# Patient Record
Sex: FEMALE | Race: WHITE | NOT HISPANIC OR LATINO | Employment: OTHER | URBAN - METROPOLITAN AREA
[De-identification: names, ages, dates, MRNs, and addresses within clinical notes are randomized per-mention and may not be internally consistent; named-entity substitution may affect disease eponyms.]

---

## 2017-01-11 ENCOUNTER — RADIANT APPOINTMENT (OUTPATIENT)
Dept: BONE DENSITY | Facility: CLINIC | Age: 57
End: 2017-01-11
Attending: FAMILY MEDICINE
Payer: COMMERCIAL

## 2017-01-11 DIAGNOSIS — Z78.0 ASYMPTOMATIC MENOPAUSAL STATE: ICD-10-CM

## 2017-01-11 PROCEDURE — 77080 DXA BONE DENSITY AXIAL: CPT | Performed by: INTERNAL MEDICINE

## 2017-01-18 ENCOUNTER — TELEPHONE (OUTPATIENT)
Dept: FAMILY MEDICINE | Facility: CLINIC | Age: 57
End: 2017-01-18

## 2017-01-18 NOTE — TELEPHONE ENCOUNTER
Synthroid (brand name) requires a Prior Authorization.   Reason / Alternatives per Insurance rejection: GENERIC SUBST. REQUIRED FOR PAYMENT    Note on prescription states: patient must have brand name as she does not absorb the generic consistently    Would you like to change the medication or attempt the prior authorization?    Patient's prescription insurance is as follows:  Insurance Company: [employer]   Bin number: [id number]   Phone number: [phone]   ID # [id number]    Please advise.     -Deborah Ram, Certified Pharmacy Technician, CPhT, [facility], Ph. [phone]

## 2017-01-18 NOTE — Clinical Note
[address] [address]  Phone: [phone]   January 23, 2017    June Nunn  [address] [facility] [address]      Dear AGUSTINA Paulson,      We received the denial of the PA for my patient listed above's recent request for brand  name, Synthroid medication for their diagnosis of Hypothyroidism, Unspecified.  My patient was taking Levothyroxine from 9/27/06-10/29/16 and their labs showed this medication was not effectively absorbed consistently to bring their results to a therapeutic range. Since changing to the Synthroid medication, my patient's labs have become therapeutic and are not fluctuating, this medication is medically necessary.      Please approve my patient to have the brand name Synthroid medication per request.      Feel free to contact me with any further questions. Thank you.     Sincerely,         Yolanda Carter M.D.

## 2017-01-19 NOTE — TELEPHONE ENCOUNTER
Name of medication and dosage:  Synthroid 112  Previously tried and failed:  levothyroxine  Submitted PA via:  covermymeds  To:  [facility]  Reference #:  [id number]  Standard or Urgent:  Standard  Date submitted:  1/19/17        Adelaide Dodson

## 2017-01-19 NOTE — TELEPHONE ENCOUNTER
"We've done prior auth's on this before - please do another one - \"patient must have brand name as she does not absorb the generic consistently\"   "

## 2017-01-20 ENCOUNTER — TELEPHONE (OUTPATIENT)
Dept: FAMILY MEDICINE | Facility: CLINIC | Age: 57
End: 2017-01-20

## 2017-01-20 DIAGNOSIS — E78.5 HYPERLIPIDEMIA WITH TARGET LDL LESS THAN 130: Primary | ICD-10-CM

## 2017-01-20 RX ORDER — ROSUVASTATIN CALCIUM 5 MG
5 TABLET ORAL DAILY
Qty: 90 TABLET | Refills: 3 | Status: SHIPPED | OUTPATIENT
Start: 2017-01-20 | End: 2017-12-14

## 2017-01-20 NOTE — TELEPHONE ENCOUNTER
Pt called stating she is out of synthroid today.  Her PA has still not been okayed.  Talked to pharmacy and they can give pt some pills to get her through.  PA has been expedited.      Pt was concerned also why she is getting rosuvastatin instead of crestor.  Will try to resend as MADDY as PA was already approved.    Sasha Calero RN    [facility]

## 2017-01-23 NOTE — TELEPHONE ENCOUNTER
Letter for appeal written, PA not approved.     Called pt to alert them of status. The patient indicates understanding of these issues and agrees with the plan. Pt notes they only have a couple tablets left.  Kendra Lugo RN

## 2017-01-24 ENCOUNTER — TELEPHONE (OUTPATIENT)
Dept: FAMILY MEDICINE | Facility: CLINIC | Age: 57
End: 2017-01-24

## 2017-01-24 NOTE — TELEPHONE ENCOUNTER
Crestor 5mg (brand name) requires a Prior Authorization.   Reason / Alternatives per Insurance rejection:PA required      Would you like to change the medication or attempt the prior authorization?    Patient's prescription insurance is as follows:  Insurance Company: [employer]   Bin number: [id number]   Phone number: [phone]   ID # [id number]    Please advise.     -Gianna Lunsford, Certified Pharmacy Technician, [facility], [facility], Ph. [phone]

## 2017-01-24 NOTE — TELEPHONE ENCOUNTER
Pt called and she stated she is out of medication.  Advised we can fill the generic and she should call her insurance company.    Sasha Calero RN    [facility]

## 2017-01-25 NOTE — TELEPHONE ENCOUNTER
Just spoke to Dr Carter, she is fine with the patient taking the generic Rosuvastatin- which has already been approved by the insurance company.      Generic has only been on the market for about 3-6 months, and as of yet Dr. Carter feels she has not failed on the generic.  Patient will need to prove that she has or is failing on the generic.      I left a message for the patient to call the pharmacy and speak to me regarding this.  Do not proceed with the PA on the brand name Crestor- no legitimate reason for her to not take the generic.    -Deborah Ram, Certified Pharmacy Technician, CP, [facility], Ph. [phone]

## 2017-01-25 NOTE — TELEPHONE ENCOUNTER
Spoke to patient.  Relayed all the information below and she is going to continue taking the generic.      -Deborah Ram, Certified Pharmacy Technician, [facility], [facility], Ph. [phone]

## 2017-01-25 NOTE — TELEPHONE ENCOUNTER
"Please do prior auth- I think we've done this in the past as well.   \"lipitor - fatigue, niaspan - some hot flashes. simvastatin = stomach upset' And pravastatin = muscle aches.   \"     "

## 2017-01-26 NOTE — TELEPHONE ENCOUNTER
Pt caller her insurance company  - the letter for the appeal needs to state that there was an adverse event when she was taking levothroxine, the labs became abnormal and when she was switched to synthroid her labs responded to the medication.   We also need to show support for the adverse event - such as labs    There can be other criteria for the appeal and we will need to discuss     She spoke with Stephanie at [facility]     Please advise     Socorro Stallings RN, BSN  [place]

## 2017-01-27 NOTE — TELEPHONE ENCOUNTER
"Patient called pharmacy after sitting on hold for the nurses for 15 minutes, told her that there are 4 nurses and doing the best they can, she was adamant that I take information from her and pass on to the clinic to \"stop wasting everyone's time\"    Pt received a letter listing the criteria needing to be met for the brand name Synthroid.  It is as follows:  1.  Patient tried 30 day trial of the generic and failed  2.  Had an adverse event from the generic (pt said this happened in early 2016)  3. Adverse event was due to inactive ingredient in generic.  4. MD has documented the adverse event when it happened and what happened    -Deborah Ram, Certified Pharmacy Technician, [facility], [facility], Ph. [phone]    "

## 2017-01-31 NOTE — TELEPHONE ENCOUNTER
Pt calling - advised that the appeal was placed to insurance     Patient stated an understanding and agreed with plan.    Socorro Stallings RN, BSN  [facility]

## 2017-02-03 NOTE — TELEPHONE ENCOUNTER
PA approved. For Synthroid 112 mcg  Effective date: 02/03/2017--02/03/2018  PA reference #: Member ID  [id number]  Pt. notified:   Yes   LMOM that PA approved and patient to notify pharmacy.  Tanja Farrell LPN

## 2017-03-20 DIAGNOSIS — I10 HYPERTENSION GOAL BP (BLOOD PRESSURE) < 140/90: ICD-10-CM

## 2017-03-20 RX ORDER — AMLODIPINE BESYLATE 2.5 MG/1
2.5 TABLET ORAL DAILY
Qty: 90 TABLET | Refills: 0 | Status: SHIPPED | OUTPATIENT
Start: 2017-03-20 | End: 2017-06-19

## 2017-06-19 DIAGNOSIS — I10 HYPERTENSION GOAL BP (BLOOD PRESSURE) < 140/90: ICD-10-CM

## 2017-06-19 RX ORDER — AMLODIPINE BESYLATE 2.5 MG/1
2.5 TABLET ORAL DAILY
Qty: 30 TABLET | Refills: 0 | Status: SHIPPED | OUTPATIENT
Start: 2017-06-19 | End: 2017-06-21

## 2017-06-21 DIAGNOSIS — I10 HYPERTENSION GOAL BP (BLOOD PRESSURE) < 140/90: ICD-10-CM

## 2017-06-21 RX ORDER — AMLODIPINE BESYLATE 2.5 MG/1
2.5 TABLET ORAL DAILY
Qty: 30 TABLET | Refills: 0 | Status: SHIPPED | OUTPATIENT
Start: 2017-06-21 | End: 2017-07-26

## 2017-06-21 NOTE — TELEPHONE ENCOUNTER
Medication is being filled for 1 time refill only due to:  Patient needs to be seen because due for BP check.   Kendra Lugo RN

## 2017-06-21 NOTE — TELEPHONE ENCOUNTER
For her next fill.    Norvasc      Last Written Prescription Date: 6-19-17  Last Fill Quantity: 30, # refills: 0    Last Office Visit with G, P or [facility] prescribing provider:  12-7-16   Future Office Visit:        BP Readings from Last 3 Encounters:   12/07/16 144/84   11/24/15 164/84   10/29/15 (!) 168/92     Thank you,  Gianna Lunsford, [facility]

## 2017-07-26 ENCOUNTER — OFFICE VISIT (OUTPATIENT)
Dept: CARDIOLOGY | Facility: CLINIC | Age: 57
End: 2017-07-26
Payer: COMMERCIAL

## 2017-07-26 ENCOUNTER — PRE VISIT (OUTPATIENT)
Dept: CARDIOLOGY | Facility: CLINIC | Age: 57
End: 2017-07-26

## 2017-07-26 VITALS
HEART RATE: 88 BPM | BODY MASS INDEX: 31.54 KG/M2 | HEIGHT: 63 IN | SYSTOLIC BLOOD PRESSURE: 174 MMHG | DIASTOLIC BLOOD PRESSURE: 100 MMHG | WEIGHT: 178 LBS

## 2017-07-26 DIAGNOSIS — I10 HYPERTENSION GOAL BP (BLOOD PRESSURE) < 140/90: ICD-10-CM

## 2017-07-26 DIAGNOSIS — R01.1 HEART MURMUR: Primary | ICD-10-CM

## 2017-07-26 PROCEDURE — 99212 OFFICE O/P EST SF 10 MIN: CPT | Performed by: INTERNAL MEDICINE

## 2017-07-26 RX ORDER — AMLODIPINE BESYLATE 5 MG/1
5 TABLET ORAL DAILY
Qty: 90 TABLET | Refills: 4 | Status: SHIPPED | OUTPATIENT
Start: 2017-07-26 | End: 2017-08-23

## 2017-07-26 NOTE — LETTER
7/26/2017    Yolanda Carter MD  [address] [address]      RE: June Nunn       Dear Colleague,    I had the pleasure of seeing June Nunn in the [facility].    HPI and Plan:   See dictation    [id number]    Orders Placed This Encounter   Procedures     Follow-Up with Cardiac Advanced Practice Provider     Echocardiogram       Orders Placed This Encounter   Medications     amLODIPine (NORVASC) 5 MG tablet     Sig: Take 1 tablet (5 mg) by mouth daily     Dispense:  90 tablet     Refill:  4       Medications Discontinued During This Encounter   Medication Reason     amLODIPine (NORVASC) 2.5 MG tablet Reorder         Encounter Diagnoses   Name Primary?     Hypertension goal BP (blood pressure) < 140/90 - new diagnosis 11/8/2013- side fx with lisinopril & metoprolol - doesn't want medications      Heart murmur Yes       CURRENT MEDICATIONS:  Current Outpatient Prescriptions   Medication Sig Dispense Refill     amLODIPine (NORVASC) 5 MG tablet Take 1 tablet (5 mg) by mouth daily 90 tablet 4     CRESTOR 5 MG tablet Take 1 tablet (5 mg) by mouth daily 90 tablet 3     SYNTHROID 112 MCG tablet Take 1 tablet (112 mcg) by mouth daily 90 tablet 3     calcium carbonate-vitamin D (CALCIUM + D) 600-200 MG-UNIT TABS Take  by mouth daily.       [DISCONTINUED] amLODIPine (NORVASC) 2.5 MG tablet Take 1 tablet (2.5 mg) by mouth daily 30 tablet 0       ALLERGIES     Allergies   Allergen Reactions     Lisinopril Other (See Comments)     lisinopril 5mg= severe vertigo     Metoprolol      Ringing in ears & extremities feeling cold       PAST MEDICAL HISTORY:  Past Medical History:   Diagnosis Date     Anxiety state, unspecified      ASCUS favor benign 2011    neg HPV     Hyperlipidemia LDL goal < 130     lipitor - fatigue, niaspan - some hot flashes. simvastatin = stomach upset' And pravastatin = muscle aches.        Hypertension goal BP (blood pressure) < 140/90      lisinopril 5mg= severe vertigo; metoprolol 25mg=ringing ears/cold extremities      Low back ache     sees Dr. Dudley Lind - Chiropractor      Other forms of migraine, with intractable migraine, so stated, without mention of status migrainosus      Symptomatic menopausal or female climacteric states     bad hot flashes      Unspecified hypothyroidism        PAST SURGICAL HISTORY:  Past Surgical History:   Procedure Laterality Date     HC TOOTH EXTRACTION W/FORCEP      wisdom       FAMILY HISTORY:  Family History   Problem Relation Age of Onset     C.A.D. Father      MI at 71,  of prostate Cancer     CANCER Father      prostate cancer     Lipids Mother      Thyroid Disease Mother      DIABETES Mother      Hypertension Mother      Hyperlipidemia Mother      CEREBROVASCULAR DISEASE Mother      Hyperlipidemia Brother      Breast Cancer Sister      Breast Cancer Sister      Thyroid Disease Sister      Thyroid Disease Sister      Breast Cancer Sister      at age 40     Hyperlipidemia Sister      Thyroid Disease Sister      Cancer - colorectal No family hx of        SOCIAL HISTORY:  Social History     Social History     Marital status:      Spouse name: Malik      Number of children: 2     Years of education: 18     Occupational History     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 12 oz of tea per day      Sleep Concern No     Stress Concern No     Special Diet No     Exercise Yes     30 minutes daily     Seat Belt Yes     Self-Exams Yes     Parent/Sibling W/ Cabg, Mi Or Angioplasty Before 65f 55m? No     Social History Narrative    Getting a master's degree in Education  "       Is a  since  2008.  Has 2 kids ages `17 and 19 - noted in 2012    Brett 23 and Slade 21 - noted December 7, 2016        Review of Systems:  Skin:  Negative       Eyes:  Positive for glasses reading glasses  ENT:  Positive for nasal congestion possibly stuffy due to seasonal allergies  Respiratory:  Negative       Cardiovascular:  Negative      Gastroenterology: Negative      Genitourinary:  Negative      Musculoskeletal:  Negative      Neurologic:  Negative      Psychiatric:  Negative      Heme/Lymph/Imm:  Positive for hay fever possible seasonal allergies  Endocrine:  Positive for thyroid disorder      Physical Exam:  Vitals: BP (!) 174/100 (BP Location: Right arm, Patient Position: Sitting, Cuff Size: Adult Regular)  Pulse 88  Ht 1.6 m (5' 3\")  Wt 80.7 kg (178 lb)  LMP 05/15/2015  Breastfeeding? No  BMI 31.53 kg/m2    Constitutional:  cooperative, alert and oriented, well developed, well nourished, in no acute distress        Skin:  warm and dry to the touch        Head:  normocephalic        Eyes:  pupils equal and round        ENT:  no pallor or cyanosis        Neck:  carotid pulses are full and equal bilaterally        Chest:  clear to auscultation;normal symmetry          Cardiac: regular rhythm       systolic ejection murmur;RUSB;LUSB          Abdomen:  abdomen soft;no bruits        Vascular: pulses full and equal                                        Extremities and Back:  no deformities, clubbing, cyanosis, erythema observed              Neurological:  affect appropriate, oriented to time, person and place;no gross motor deficits          DATE OF VISIT:  07/26/2017    REFERRING PHYSICIAN:  Dr. Carter    Ms. Nunn is a pleasant 57-year-old female with a underlying history of hypertension and hyperlipidemia, who returns to clinic today due to elevated blood pressures.  I last saw her back in 11/2015 at which time, we placed her on low-dose amlodipine 2.5 mg for her blood pressure.  " She is quite a motivated lady about her healthy lifestyle.  She exercises rigorously and tries to eat right.  She is a little self-deprecating about her medical issues.  I did try to explain to her that essential hypertension is partly genetic and she likely was going to develop high blood pressure because it is in her family history.  She was a bit reluctant taking medication for this in the past, but she did take and it had been working for her quite a while up until recently and she has noticed that her blood pressure has started to increase again.  She was a little tearful about this as she has been once again working really diligently on lifestyle modifications including diet and exercise to attempt to control her high blood pressure as well as cholesterol.  She does describe significant social stressors right now.  It sounds like her youngest child is going to be moving out of the home permanently, going off to college, etc and this has caused her to have a little bit of a empty nest syndrome with that.  She also is planning on selling her home which has been stressful as well.  She does remind me that her blood pressure always goes up a lot higher than what she measures at home in a doctor's office.  She probably has an element of white coat hypertension.  Today in office it was 174/100, pulse of 88.  Weight was 178 and this is gone up about 4 pounds since our last visit.  On exam, I do not appreciate any carotid bruits, but she does have systolic ejection murmurs both in the right and left upper sternal borders.  I did inquire as to whether she has had this before and she reminded me that I did hear it upon her initial visit in 2015 and recommended an echocardiogram but she did not follow through to have that done.  Her lungs are clear posteriorly and she has no peripheral edema on exam today.    In summary Ms. Nunn is a very pleasant 57-year-old female with a history of hypertension and hyperlipidemia.   Her blood pressure has not been well controlled in recent months and we talked about probably social stressors contributing to this as well as over time the body does become more tolerant of medication and often requires additional medication to control blood pressure.  I have recommended that we simply increase her amlodipine from 2.5 mg to 5 mg daily.  I did caution her that she might experience lower extremity edema with this, especially in these hot humid months but she exercises enough that hopefully this will not be an issue.  I will follow up with her though on this to make sure it is controlling her blood pressure better and also not causing any significant side effects.  Lastly, I encouraged her once again to follow up with an echocardiogram.  I am concerned about the heart murmur.  It may be just a benign flow murmur, but I do want to make sure she does not have any significant pulmonary hypertension or valvular issues going on.  She will schedule this and we will go over the results with her followup visit.  Please feel free to contact me with any questions you have in regards to her care.    CC:  Dr. Emma Clark DO    D:  07/26/2017 18:13 T:  07/27/2017 17:52  Document:  [id number] CD\LH\BR    Thank you for allowing me to participate in the care of your patient.    Sincerely,     Aide Clark DO     [facility]

## 2017-07-26 NOTE — MR AVS SNAPSHOT
After Visit Summary   7/26/2017    June Nunn    MRN: [id number]           Patient Information     Date Of Birth          1960        Visit Information        Provider Department      7/26/2017 4:45 PM Aide Clark DO [facility] [place]        Today's Diagnoses     Heart murmur    -  1    Hypertension goal BP (blood pressure) < 140/90 - new diagnosis 11/8/2013- side fx with lisinopril & metoprolol - doesn't want medications           Follow-ups after your visit        Additional Services     Follow-Up with Cardiac Advanced Practice Provider                 Your next 10 appointments already scheduled     Aug 07, 2017  3:00 PM CDT   Ech Complete with [address] ([facility])    [address]  [facility] [address] [phone]           1. Please bring or wear a comfortable two-piece outfit. 2. You may eat, drink and take your normal medicines. 3. For any questions that cannot be answered, please contact the ordering physician ***Please check-in at the [place] Registration Office located in [address] in the [facility] building. When you are finished registering, please go to [address] and have a seat. The technician will call your name for the test.            Aug 23, 2017  3:10 PM CDT   Return Visit with MARGO Davila CNP   [facility] [place] ([facility])    [address]  [facility] [id number] [phone]              Future tests that were ordered for you today     Open Future Orders        Priority Expected Expires Ordered    Echocardiogram Routine 8/2/2017 7/26/2018 7/26/2017    Follow-Up with Cardiac Advanced Practice Provider Routine 8/25/2017 7/26/2018 7/26/2017            Who to contact     If you have questions or need follow up information about today's clinic visit or your  "schedule please contact [facility] [place] directly at [phone].  Normal or non-critical lab and imaging results will be communicated to you by MyChart, letter or phone within 4 business days after the clinic has received the results. If you do not hear from us within 7 days, please contact the clinic through [employer] or phone. If you have a critical or abnormal lab result, we will notify you by phone as soon as possible.  Submit refill requests through [employer] or call your pharmacy and they will forward the refill request to us. Please allow 3 business days for your refill to be completed.          Additional Information About Your Visit        [employer] Information     [employer] gives you secure access to your electronic health record. If you see a primary care provider, you can also send messages to your care team and make appointments. If you have questions, please call your primary care clinic.  If you do not have a primary care provider, please call [phone] and they will assist you.        Care EveryWhere ID     This is your Care EveryWhere ID. This could be used by other organizations to access your [facility] medical records  [id number]        Your Vitals Were     Pulse Height Last Period Breastfeeding? BMI (Body Mass Index)       88 1.6 m (5' 3\") 05/15/2015 No 31.53 kg/m2        Blood Pressure from Last 3 Encounters:   07/26/17 (!) 174/100   12/07/16 144/84   11/24/15 164/84    Weight from Last 3 Encounters:   07/26/17 80.7 kg (178 lb)   12/07/16 80 kg (176 lb 6 oz)   11/24/15 80.7 kg (178 lb)                 Today's Medication Changes          These changes are accurate as of: 7/26/17  5:10 PM.  If you have any questions, ask your nurse or doctor.               These medicines have changed or have updated prescriptions.        Dose/Directions    amLODIPine 5 MG tablet   Commonly known as:  NORVASC   This may have changed:    - medication strength  - how much to take "   Used for:  Hypertension goal BP (blood pressure) < 140/90   Changed by:  Aide Clark DO        Dose:  5 mg   Take 1 tablet (5 mg) by mouth daily   Quantity:  90 tablet   Refills:  4            Where to get your medicines      These medications were sent to [facility] [address]  [address] [address]     Phone:  [phone]     amLODIPine 5 MG tablet                Primary Care Provider Office Phone # Fax #    Yolanda Carter [phone]       [facility] [address] [address]        Equal Access to Services     AMBREEN ONEAL : Hadii aad ku hadasho Soomaali, waaxda luqadaha, qaybta kaalmada adeegyada, hannah chambers. So [facility] [phone].    ATENCIÓN: Si habla español, tiene a joseph disposición servicios gratuitos de asistencia lingüística. Llame al [phone].    We comply with applicable federal civil rights laws and [place] laws. We do not discriminate on the basis of race, color, national origin, age, disability sex, sexual orientation or gender identity.            Thank you!     Thank you for choosing [facility] [place]  for your care. Our goal is always to provide you with excellent care. Hearing back from our patients is one way we can continue to improve our services. Please take a few minutes to complete the written survey that you may receive in the mail after your visit with us. Thank you!             Your Updated Medication List - Protect others around you: Learn how to safely use, store and throw away your medicines at www.disposemymeds.org.          This list is accurate as of: 7/26/17  5:10 PM.  Always use your most recent med list.                   Brand Name Dispense Instructions for use Diagnosis    amLODIPine 5 MG tablet    NORVASC    90 tablet    Take 1 tablet (5 mg) by mouth daily     Hypertension goal BP (blood pressure) < 140/90       calcium + D 600-200 MG-UNIT Tabs   Generic drug:  calcium carbonate-vitamin D      Take  by mouth daily.        CRESTOR 5 MG tablet   Generic drug:  rosuvastatin     90 tablet    Take 1 tablet (5 mg) by mouth daily    Hyperlipidemia with target LDL less than 130       SYNTHROID 112 MCG tablet   Generic drug:  levothyroxine     90 tablet    Take 1 tablet (112 mcg) by mouth daily    Hypothyroidism, unspecified

## 2017-07-26 NOTE — PROGRESS NOTES
HPI and Plan:   See dictation    [id number]    Orders Placed This Encounter   Procedures     Follow-Up with Cardiac Advanced Practice Provider     Echocardiogram       Orders Placed This Encounter   Medications     amLODIPine (NORVASC) 5 MG tablet     Sig: Take 1 tablet (5 mg) by mouth daily     Dispense:  90 tablet     Refill:  4       Medications Discontinued During This Encounter   Medication Reason     amLODIPine (NORVASC) 2.5 MG tablet Reorder         Encounter Diagnoses   Name Primary?     Hypertension goal BP (blood pressure) < 140/90 - new diagnosis 11/8/2013- side fx with lisinopril & metoprolol - doesn't want medications      Heart murmur Yes       CURRENT MEDICATIONS:  Current Outpatient Prescriptions   Medication Sig Dispense Refill     amLODIPine (NORVASC) 5 MG tablet Take 1 tablet (5 mg) by mouth daily 90 tablet 4     CRESTOR 5 MG tablet Take 1 tablet (5 mg) by mouth daily 90 tablet 3     SYNTHROID 112 MCG tablet Take 1 tablet (112 mcg) by mouth daily 90 tablet 3     calcium carbonate-vitamin D (CALCIUM + D) 600-200 MG-UNIT TABS Take  by mouth daily.       [DISCONTINUED] amLODIPine (NORVASC) 2.5 MG tablet Take 1 tablet (2.5 mg) by mouth daily 30 tablet 0       ALLERGIES     Allergies   Allergen Reactions     Lisinopril Other (See Comments)     lisinopril 5mg= severe vertigo     Metoprolol      Ringing in ears & extremities feeling cold       PAST MEDICAL HISTORY:  Past Medical History:   Diagnosis Date     Anxiety state, unspecified      ASCUS favor benign 2011    neg HPV     Hyperlipidemia LDL goal < 130     lipitor - fatigue, niaspan - some hot flashes. simvastatin = stomach upset' And pravastatin = muscle aches.        Hypertension goal BP (blood pressure) < 140/90     lisinopril 5mg= severe vertigo; metoprolol 25mg=ringing ears/cold extremities      Low back ache     sees Dr. Dudley Lind - Chiropractor      Other forms of migraine, with intractable migraine, so stated, without mention of status  migrainosus      Symptomatic menopausal or female climacteric states     bad hot flashes      Unspecified hypothyroidism        PAST SURGICAL HISTORY:  Past Surgical History:   Procedure Laterality Date     HC TOOTH EXTRACTION W/FORCEP      wisdom       FAMILY HISTORY:  Family History   Problem Relation Age of Onset     C.A.D. Father      MI at 71,  of prostate Cancer     CANCER Father      prostate cancer     Lipids Mother      Thyroid Disease Mother      DIABETES Mother      Hypertension Mother      Hyperlipidemia Mother      CEREBROVASCULAR DISEASE Mother      Hyperlipidemia Brother      Breast Cancer Sister      Breast Cancer Sister      Thyroid Disease Sister      Thyroid Disease Sister      Breast Cancer Sister      at age 40     Hyperlipidemia Sister      Thyroid Disease Sister      Cancer - colorectal No family hx of        SOCIAL HISTORY:  Social History     Social History     Marital status:      Spouse name: Malik      Number of children: 2     Years of education: 18     Occupational History     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 12 oz of tea per day      Sleep Concern No     Stress Concern No     Special Diet No     Exercise Yes     30 minutes daily     Seat Belt Yes     Self-Exams Yes     Parent/Sibling W/ Cabg, Mi Or Angioplasty Before 65f 55m? No     Social History Narrative    Getting a master's degree in Education        Is a  since  .  Has 2 kids ages `17 and 19 - noted in     [place]  and [place] 21 - noted 2016        Review of Systems:  Skin:  Negative       Eyes:  Positive for glasses reading glasses  ENT:   "Positive for nasal congestion possibly stuffy due to seasonal allergies  Respiratory:  Negative       Cardiovascular:  Negative      Gastroenterology: Negative      Genitourinary:  Negative      Musculoskeletal:  Negative      Neurologic:  Negative      Psychiatric:  Negative      Heme/Lymph/Imm:  Positive for hay fever possible seasonal allergies  Endocrine:  Positive for thyroid disorder      Physical Exam:  Vitals: BP (!) 174/100 (BP Location: Right arm, Patient Position: Sitting, Cuff Size: Adult Regular)  Pulse 88  Ht 1.6 m (5' 3\")  Wt 80.7 kg (178 lb)  LMP 05/15/2015  Breastfeeding? No  BMI 31.53 kg/m2    Constitutional:  cooperative, alert and oriented, well developed, well nourished, in no acute distress        Skin:  warm and dry to the touch        Head:  normocephalic        Eyes:  pupils equal and round        ENT:  no pallor or cyanosis        Neck:  carotid pulses are full and equal bilaterally        Chest:  clear to auscultation;normal symmetry          Cardiac: regular rhythm       systolic ejection murmur;RUSB;LUSB          Abdomen:  abdomen soft;no bruits        Vascular: pulses full and equal                                        Extremities and Back:  no deformities, clubbing, cyanosis, erythema observed              Neurological:  affect appropriate, oriented to time, person and place;no gross motor deficits              CC  No referring provider defined for this encounter.                  "

## 2017-07-28 NOTE — PROGRESS NOTES
DATE OF VISIT:  07/26/2017    REFERRING PHYSICIAN:  Dr. Carter    Ms. Nunn is a pleasant 57-year-old female with a underlying history of hypertension and hyperlipidemia, who returns to clinic today due to elevated blood pressures.  I last saw her back in 11/2015 at which time, we placed her on low-dose amlodipine 2.5 mg for her blood pressure.  She is quite a motivated lady about her healthy lifestyle.  She exercises rigorously and tries to eat right.  She is a little self-deprecating about her medical issues.  I did try to explain to her that essential hypertension is partly genetic and she likely was going to develop high blood pressure because it is in her family history.  She was a bit reluctant taking medication for this in the past, but she did take and it had been working for her quite a while up until recently and she has noticed that her blood pressure has started to increase again.  She was a little tearful about this as she has been once again working really diligently on lifestyle modifications including diet and exercise to attempt to control her high blood pressure as well as cholesterol.  She does describe significant social stressors right now.  It sounds like her youngest child is going to be moving out of the home permanently, going off to college, etc and this has caused her to have a little bit of a empty nest syndrome with that.  She also is planning on selling her home which has been stressful as well.  She does remind me that her blood pressure always goes up a lot higher than what she measures at home in a doctor's office.  She probably has an element of white coat hypertension.  Today in office it was 174/100, pulse of 88.  Weight was 178 and this is gone up about 4 pounds since our last visit.  On exam, I do not appreciate any carotid bruits, but she does have systolic ejection murmurs both in the right and left upper sternal borders.  I did inquire as to whether she has had this  before and she reminded me that I did hear it upon her initial visit in 2015 and recommended an echocardiogram but she did not follow through to have that done.  Her lungs are clear posteriorly and she has no peripheral edema on exam today.    In summary Ms. Nunn is a very pleasant 57-year-old female with a history of hypertension and hyperlipidemia.  Her blood pressure has not been well controlled in recent months and we talked about probably social stressors contributing to this as well as over time the body does become more tolerant of medication and often requires additional medication to control blood pressure.  I have recommended that we simply increase her amlodipine from 2.5 mg to 5 mg daily.  I did caution her that she might experience lower extremity edema with this, especially in these hot humid months but she exercises enough that hopefully this will not be an issue.  I will follow up with her though on this to make sure it is controlling her blood pressure better and also not causing any significant side effects.  Lastly, I encouraged her once again to follow up with an echocardiogram.  I am concerned about the heart murmur.  It may be just a benign flow murmur, but I do want to make sure she does not have any significant pulmonary hypertension or valvular issues going on.  She will schedule this and we will go over the results with her followup visit.  Please feel free to contact me with any questions you have in regards to her care.    CC:  Dr. Emma Clark DO    D:  07/26/2017 18:13 T:  07/27/2017 17:52  Document:  [id number] CD\LH\BR

## 2017-08-08 ENCOUNTER — HOSPITAL ENCOUNTER (OUTPATIENT)
Dept: CARDIOLOGY | Facility: CLINIC | Age: 57
Discharge: HOME | End: 2017-08-08
Attending: INTERNAL MEDICINE | Admitting: INTERNAL MEDICINE
Payer: COMMERCIAL

## 2017-08-08 DIAGNOSIS — I10 HYPERTENSION GOAL BP (BLOOD PRESSURE) < 140/90: ICD-10-CM

## 2017-08-08 DIAGNOSIS — R01.1 HEART MURMUR: ICD-10-CM

## 2017-08-08 PROCEDURE — 93306 TTE W/DOPPLER COMPLETE: CPT | Mod: 26 | Performed by: INTERNAL MEDICINE

## 2017-08-08 PROCEDURE — 93306 TTE W/DOPPLER COMPLETE: CPT

## 2017-08-23 ENCOUNTER — OFFICE VISIT (OUTPATIENT)
Dept: CARDIOLOGY | Facility: CLINIC | Age: 57
End: 2017-08-23
Attending: INTERNAL MEDICINE
Payer: COMMERCIAL

## 2017-08-23 VITALS
BODY MASS INDEX: 31.54 KG/M2 | DIASTOLIC BLOOD PRESSURE: 80 MMHG | HEIGHT: 63 IN | WEIGHT: 178 LBS | HEART RATE: 80 BPM | SYSTOLIC BLOOD PRESSURE: 158 MMHG

## 2017-08-23 DIAGNOSIS — R01.1 HEART MURMUR: ICD-10-CM

## 2017-08-23 DIAGNOSIS — I10 HYPERTENSION GOAL BP (BLOOD PRESSURE) < 140/90: ICD-10-CM

## 2017-08-23 DIAGNOSIS — I10 ESSENTIAL HYPERTENSION WITH GOAL BLOOD PRESSURE LESS THAN 140/90: Primary | ICD-10-CM

## 2017-08-23 PROCEDURE — 99214 OFFICE O/P EST MOD 30 MIN: CPT | Performed by: NURSE PRACTITIONER

## 2017-08-23 RX ORDER — AMLODIPINE BESYLATE 5 MG/1
7.5 TABLET ORAL DAILY
Qty: 135 TABLET | Refills: 4 | Status: SHIPPED | OUTPATIENT
Start: 2017-08-23 | End: 2017-10-02

## 2017-08-23 NOTE — LETTER
8/23/2017    Yolanda Carter MD  [address] [address]    RE: June Nunn       Dear Colleague,    I had the pleasure of seeing June Nunn in the [facility].    This is a 57-year-old female who presents to [facility] today for a followup visit.  She is a patient of Dr. Clark seen in our clinic for hypertension and hyperlipidemia.      June has a history of hypertension that has been ongoing since 2015.  She has worked on lifestyle modifications, but despite this her blood pressure has remained elevated.  In the past, she had some intolerance to atenolol and lisinopril, but has done well on low-dose amlodipine.  When seen by Dr. Clark last month, her blood pressure was elevated and she was asked to increase her amlodipine to 5 mg.  In addition, Dr. Clark heard an audible murmur and suggested echocardiogram.  June has been on a statin for her hyperlipidemia.  She returns today for reassessment and review of her test result.      Overall, June tells me she is doing well.  She is very active by walking and riding a bike without any limitations.  She has no chest pain or shortness of breath.  She denies palpitations, lightheadedness, dizziness, orthopnea or peripheral edema.  She tells me she watches the salt in her diet.      June does monitor her blood pressure at home and tells me her systolic blood pressures are running around 139 mmHg on a regular basis.      I reviewed her echocardiogram.  This shows normal left ventricular function with ejection fraction of 65%-70%, grade I diastolic dysfunction.  There are no regional wall motion abnormalities.  She is noted to have normal right ventricular function and trace tricuspid regurgitation.      PHYSICAL EXAMINATION:  Her blood pressure today is 158/80, heart rate 80 beats per minute and is regular.  Her lungs are clear.  She has trace ankle edema noted  bilaterally.     Outpatient Encounter Prescriptions as of 8/23/2017   Medication Sig Dispense Refill     Omega-3 Fatty Acids (FISH OIL) 600 MG CAPS Take 2,400 mg by mouth daily        [DISCONTINUED] amLODIPine (NORVASC) 5 MG tablet Take 1.5 tablets (7.5 mg) by mouth daily 135 tablet 4     CRESTOR 5 MG tablet Take 1 tablet (5 mg) by mouth daily 90 tablet 3     SYNTHROID 112 MCG tablet Take 1 tablet (112 mcg) by mouth daily 90 tablet 3     calcium carbonate-vitamin D (CALCIUM + D) 600-200 MG-UNIT TABS Take 2 tablets by mouth daily        [DISCONTINUED] amLODIPine (NORVASC) 5 MG tablet Take 1 tablet (5 mg) by mouth daily 90 tablet 4     No facility-administered encounter medications on file as of 8/23/2017.       IMPRESSION AND PLAN:   1.  Hypertension.  Blood pressure is still elevated today despite the increase of amlodipine.  Echocardiogram does show some diastolic dysfunction.  We talked about possibly adding hydrochlorothiazide.  However, at this time, to simplify things we will cautiously increase her amlodipine to 7.5 mg each evening.  She will continue to monitor her blood pressure at home.  If her systolic blood pressure is still running in the 130s, I have asked her to increase her amlodipine to 10 mg.  She will be observant for ankle swelling.  Otherwise, we will have June return in 1 month for reassessment.   2.  Treated hyperlipidemia.      Thank you for allowing me to participate in this patient's care.     Sincerely,    MARGO Coronado CNP     [facility]

## 2017-08-23 NOTE — MR AVS SNAPSHOT
After Visit Summary   8/23/2017    June Nunn    MRN: [id number]           Patient Information     Date Of Birth          1960        Visit Information        Provider Department      8/23/2017 3:10 PM Nisha Cyr APRN CNP [facility]        Today's Diagnoses     Essential hypertension with goal blood pressure less than 140/90-new diagnosis 11/8/2013- side fx with lisinopril & metoprolol -doesn't want meds    -  1    Hypertension goal BP (blood pressure) < 140/90 - new diagnosis 11/8/2013- side fx with lisinopril & metoprolol - doesn't want medications        Heart murmur           Follow-ups after your visit        Additional Services     Follow-Up with Cardiac Advanced Practice Provider                 Your next 10 appointments already scheduled     Oct 02, 2017  1:30 PM CDT   LAB with RU LAB   [facility] ([facility])    [address]  [place] [address] [phone]           Patient must bring picture ID. Patient should be prepared to give a urine specimen  Please do not eat 10-12 hours before your appointment if you are coming in fasting for labs on lipids, cholesterol, or glucose (sugar). Pregnant women should follow their Care Team instructions. Water with medications is okay. Do not drink coffee or other fluids. If you have concerns about taking  your medications, please ask at office or if scheduling via [facility], send a message by clicking on Secure Messaging, Message Your Care Team.            Oct 02, 2017  2:30 PM CDT   Return Visit with MARGO Davila CNP   [facility] ([facility])    [address]  [place] [id number] [phone]              Future tests that were ordered for you today     Open Future Orders        Priority Expected Expires Ordered    Follow-Up with Cardiac Advanced Practice  "Provider Routine 9/22/2017 8/23/2018 8/23/2017            Who to contact     If you have questions or need follow up information about today's clinic visit or your schedule please contact [facility] [place] directly at [phone].  Normal or non-critical lab and imaging results will be communicated to you by MyChart, letter or phone within 4 business days after the clinic has received the results. If you do not hear from us within 7 days, please contact the clinic through [employer] or phone. If you have a critical or abnormal lab result, we will notify you by phone as soon as possible.  Submit refill requests through [employer] or call your pharmacy and they will forward the refill request to us. Please allow 3 business days for your refill to be completed.          Additional Information About Your Visit        [employer] Information     [employer] gives you secure access to your electronic health record. If you see a primary care provider, you can also send messages to your care team and make appointments. If you have questions, please call your primary care clinic.  If you do not have a primary care provider, please call [phone] and they will assist you.        Care EveryWhere ID     This is your Care EveryWhere ID. This could be used by other organizations to access your [place] medical records  [id number]        Your Vitals Were     Pulse Height Last Period BMI (Body Mass Index)          80 1.6 m (5' 3\") 05/15/2015 31.53 kg/m2         Blood Pressure from Last 3 Encounters:   08/23/17 158/80   07/26/17 (!) 174/100   12/07/16 144/84    Weight from Last 3 Encounters:   08/23/17 80.7 kg (178 lb)   07/26/17 80.7 kg (178 lb)   12/07/16 80 kg (176 lb 6 oz)              We Performed the Following     Follow-Up with Cardiac Advanced Practice Provider          Today's Medication Changes          These changes are accurate as of: 8/23/17  3:46 PM.  If you have any questions, ask your nurse " or doctor.               These medicines have changed or have updated prescriptions.        Dose/Directions    amLODIPine 5 MG tablet   Commonly known as:  NORVASC   This may have changed:  how much to take   Used for:  Hypertension goal BP (blood pressure) < 140/90   Changed by:  Nisha Cyr APRN CNP        Dose:  7.5 mg   Take 1.5 tablets (7.5 mg) by mouth daily   Quantity:  135 tablet   Refills:  4            Where to get your medicines      These medications were sent to [facility] - [address], [facility] [address]     Phone:  [phone]     amLODIPine 5 MG tablet                Primary Care Provider Office Phone # Fax #    Yolanda Carter [phone]       [address] [place] [address]        Equal Access to Services     AMBREEN ONEAL : Hadii willis sow hadasho Soomaali, waaxda luqadaha, qaybta kaalmada adeegyada, hannah mendez . So [facility] [phone].    ATENCIÓN: Si habla español, tiene a joseph disposición servicios gratuitos de asistencia lingüística. [facility] [phone].    We comply with applicable federal civil rights laws and [place] laws. We do not discriminate on the basis of race, color, national origin, age, disability sex, sexual orientation or gender identity.            Thank you!     Thank you for choosing [facility] [place]  for your care. Our goal is always to provide you with excellent care. Hearing back from our patients is one way we can continue to improve our services. Please take a few minutes to complete the written survey that you may receive in the mail after your visit with us. Thank you!             Your Updated Medication List - Protect others around you: Learn how to safely use, store and throw away your medicines at www.disposemymeds.org.          This list is accurate as of: 8/23/17  3:46 PM.  Always use  your most recent med list.                   Brand Name Dispense Instructions for use Diagnosis    amLODIPine 5 MG tablet    NORVASC    135 tablet    Take 1.5 tablets (7.5 mg) by mouth daily    Hypertension goal BP (blood pressure) < 140/90       calcium + D 600-200 MG-UNIT Tabs   Generic drug:  calcium carbonate-vitamin D      Take  by mouth daily.        CRESTOR 5 MG tablet   Generic drug:  rosuvastatin     90 tablet    Take 1 tablet (5 mg) by mouth daily    Hyperlipidemia with target LDL less than 130       Fish Oil 600 MG Caps      Take by mouth 2 times daily        SYNTHROID 112 MCG tablet   Generic drug:  levothyroxine     90 tablet    Take 1 tablet (112 mcg) by mouth daily    Hypothyroidism, unspecified

## 2017-08-23 NOTE — PROGRESS NOTES
HPI and Plan: #[id number]  See dictation    Orders Placed This Encounter   Procedures     Follow-Up with Cardiac Advanced Practice Provider       Orders Placed This Encounter   Medications     Omega-3 Fatty Acids (FISH OIL) 600 MG CAPS     Sig: Take by mouth 2 times daily     amLODIPine (NORVASC) 5 MG tablet     Sig: Take 1.5 tablets (7.5 mg) by mouth daily     Dispense:  135 tablet     Refill:  4     Pt does not need refills at this time       Medications Discontinued During This Encounter   Medication Reason     amLODIPine (NORVASC) 5 MG tablet Reorder         Encounter Diagnoses   Name Primary?     Hypertension goal BP (blood pressure) < 140/90 - new diagnosis 11/8/2013- side fx with lisinopril & metoprolol - doesn't want medications      Heart murmur      Essential hypertension with goal blood pressure less than 140/90-new diagnosis 11/8/2013- side fx with lisinopril & metoprolol -doesn't want meds Yes       CURRENT MEDICATIONS:  Current Outpatient Prescriptions   Medication Sig Dispense Refill     Omega-3 Fatty Acids (FISH OIL) 600 MG CAPS Take by mouth 2 times daily       amLODIPine (NORVASC) 5 MG tablet Take 1.5 tablets (7.5 mg) by mouth daily 135 tablet 4     CRESTOR 5 MG tablet Take 1 tablet (5 mg) by mouth daily 90 tablet 3     SYNTHROID 112 MCG tablet Take 1 tablet (112 mcg) by mouth daily 90 tablet 3     calcium carbonate-vitamin D (CALCIUM + D) 600-200 MG-UNIT TABS Take  by mouth daily.       [DISCONTINUED] amLODIPine (NORVASC) 5 MG tablet Take 1 tablet (5 mg) by mouth daily 90 tablet 4       ALLERGIES     Allergies   Allergen Reactions     Lisinopril Other (See Comments)     lisinopril 5mg= severe vertigo     Metoprolol      Ringing in ears & extremities feeling cold       PAST MEDICAL HISTORY:  Past Medical History:   Diagnosis Date     Anxiety state, unspecified      ASCUS favor benign 2011    neg HPV     Hyperlipidemia LDL goal < 130     lipitor - fatigue, niaspan - some hot flashes. simvastatin =  stomach upset' And pravastatin = muscle aches.        Hypertension goal BP (blood pressure) < 140/90     lisinopril 5mg= severe vertigo; metoprolol 25mg=ringing ears/cold extremities      Low back ache     sees Dr. Dudley Lind - Chiropractor      Other forms of migraine, with intractable migraine, so stated, without mention of status migrainosus      Symptomatic menopausal or female climacteric states     bad hot flashes      Unspecified hypothyroidism        PAST SURGICAL HISTORY:  Past Surgical History:   Procedure Laterality Date     HC TOOTH EXTRACTION W/FORCEP      wisdom       FAMILY HISTORY:  Family History   Problem Relation Age of Onset     C.A.D. Father      MI at 71,  of prostate Cancer     CANCER Father      prostate cancer     Lipids Mother      Thyroid Disease Mother      DIABETES Mother      Hypertension Mother      Hyperlipidemia Mother      CEREBROVASCULAR DISEASE Mother      Hyperlipidemia Brother      Breast Cancer Sister      Breast Cancer Sister      Thyroid Disease Sister      Thyroid Disease Sister      Breast Cancer Sister      at age 40     Hyperlipidemia Sister      Thyroid Disease Sister      Cancer - colorectal No family hx of        SOCIAL HISTORY:  Social History     Social History     Marital status:      Spouse name: Malik      Number of children: 2     Years of education: 18     Occupational History     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 12 oz of tea per day      Sleep Concern No     Stress Concern No     Special Diet No     Exercise Yes     30 minutes daily     Seat Belt Yes     Self-Exams Yes     Parent/Sibling W/ Cabg, Mi  "Or Angioplasty Before 65f 55m? No     Social History Narrative    Getting a master's degree in Education        Is a  since  2008.  Has 2 kids ages `17 and 19 - noted in 2012    Brett 23 and Slade 21 - noted December 7, 2016        Review of Systems:  Skin:  Negative for       Eyes:  Positive for glasses    ENT:  Negative for      Respiratory:  Negative for       Cardiovascular:         Gastroenterology: Negative for      Genitourinary:  Negative for      Musculoskeletal:  Negative for      Neurologic:         Psychiatric:  Negative      Heme/Lymph/Imm:  Negative      Endocrine:  Positive for thyroid disorder      Physical Exam:  Vitals: /80  Pulse 80  Ht 1.6 m (5' 3\")  Wt 80.7 kg (178 lb)  LMP 05/15/2015  BMI 31.53 kg/m2    Constitutional:  cooperative, alert and oriented, well developed, well nourished, in no acute distress overweight      Skin:  warm and dry to the touch        Head:  normocephalic        Eyes:  pupils equal and round        ENT:  no pallor or cyanosis        Neck:  carotid pulses are full and equal bilaterally        Chest:  clear to auscultation;normal symmetry          Cardiac: regular rhythm       systolic ejection murmur;RUSB;LUSB          Abdomen:  abdomen soft;no bruits        Vascular: pulses full and equal                                        Extremities and Back:  no deformities, clubbing, cyanosis, erythema observed   trace;bilateral LE edema          Neurological:  affect appropriate, oriented to time, person and place;no gross motor deficits              CC  Aide Clark, DO  [address]  [place] [address]                  "

## 2017-08-24 NOTE — PROGRESS NOTES
HISTORY OF PRESENT ILLNESS:  This is a 57-year-old female who presents to [facility] today for a followup visit.  She is a patient of Dr. Clark seen in our clinic for hypertension and hyperlipidemia.      June has a history of hypertension that has been ongoing since 2015.  She has worked on lifestyle modifications, but despite this her blood pressure has remained elevated.  In the past, she had some intolerance to atenolol and lisinopril, but has done well on low-dose amlodipine.  When seen by Dr. Calrk last month, her blood pressure was elevated and she was asked to increase her amlodipine to 5 mg.  In addition, Dr. Clark heard an audible murmur and suggested echocardiogram.  June has been on a statin for her hyperlipidemia.  She returns today for reassessment and review of her test result.      Overall, June tells me she is doing well.  She is very active by walking and riding a bike without any limitations.  She has no chest pain or shortness of breath.  She denies palpitations, lightheadedness, dizziness, orthopnea or peripheral edema.  She tells me she watches the salt in her diet.      June does monitor her blood pressure at home and tells me her systolic blood pressures are running around 139 mmHg on a regular basis.      I reviewed her echocardiogram.  This shows normal left ventricular function with ejection fraction of 65%-70%, grade I diastolic dysfunction.  There are no regional wall motion abnormalities.  She is noted to have normal right ventricular function and trace tricuspid regurgitation.      PHYSICAL EXAMINATION:  Her blood pressure today is 158/80, heart rate 80 beats per minute and is regular.  Her lungs are clear.  She has trace ankle edema noted bilaterally.      IMPRESSION AND PLAN:   1.  Hypertension.  Blood pressure is still elevated today despite the increase of amlodipine.  Echocardiogram does show some diastolic dysfunction.  We talked  about possibly adding hydrochlorothiazide.  However, at this time, to simplify things we will cautiously increase her amlodipine to 7.5 mg each evening.  She will continue to monitor her blood pressure at home.  If her systolic blood pressure is still running in the 130s, I have asked her to increase her amlodipine to 10 mg.  She will be observant for ankle swelling.  Otherwise, we will have June return in 1 month for reassessment.   2.  Treated hyperlipidemia.      Thank you for allowing me to participate in this patient's care.         MARGO GLORIA, CNP             D: 2017 15:44   T: 2017 22:45   MT: ROGER      Name:     JUNE NEIL   MRN:      [id number]        Account:      [id number]   :      1960           Service Date: 2017      Document: [id number]

## 2017-10-02 ENCOUNTER — OFFICE VISIT (OUTPATIENT)
Dept: CARDIOLOGY | Facility: CLINIC | Age: 57
End: 2017-10-02
Attending: NURSE PRACTITIONER
Payer: COMMERCIAL

## 2017-10-02 VITALS
WEIGHT: 178 LBS | HEART RATE: 98 BPM | BODY MASS INDEX: 31.54 KG/M2 | HEIGHT: 63 IN | SYSTOLIC BLOOD PRESSURE: 142 MMHG | DIASTOLIC BLOOD PRESSURE: 90 MMHG

## 2017-10-02 DIAGNOSIS — I10 HTN (HYPERTENSION): Primary | ICD-10-CM

## 2017-10-02 DIAGNOSIS — I10 HTN (HYPERTENSION): ICD-10-CM

## 2017-10-02 DIAGNOSIS — I10 HYPERTENSION GOAL BP (BLOOD PRESSURE) < 140/90: ICD-10-CM

## 2017-10-02 LAB
ANION GAP SERPL CALCULATED.3IONS-SCNC: 5 MMOL/L (ref 3–14)
BUN SERPL-MCNC: 18 MG/DL (ref 7–30)
CALCIUM SERPL-MCNC: 9.6 MG/DL (ref 8.5–10.1)
CHLORIDE SERPL-SCNC: 104 MMOL/L (ref 94–109)
CO2 SERPL-SCNC: 31 MMOL/L (ref 20–32)
CREAT SERPL-MCNC: 0.97 MG/DL (ref 0.52–1.04)
GFR SERPL CREATININE-BSD FRML MDRD: 59 ML/MIN/1.7M2
GLUCOSE SERPL-MCNC: 82 MG/DL (ref 70–99)
POTASSIUM SERPL-SCNC: 4.6 MMOL/L (ref 3.4–5.3)
SODIUM SERPL-SCNC: 140 MMOL/L (ref 133–144)

## 2017-10-02 PROCEDURE — 36415 COLL VENOUS BLD VENIPUNCTURE: CPT | Performed by: NURSE PRACTITIONER

## 2017-10-02 PROCEDURE — 80048 BASIC METABOLIC PNL TOTAL CA: CPT | Performed by: NURSE PRACTITIONER

## 2017-10-02 PROCEDURE — 99214 OFFICE O/P EST MOD 30 MIN: CPT | Performed by: NURSE PRACTITIONER

## 2017-10-02 RX ORDER — AMLODIPINE BESYLATE 10 MG/1
10 TABLET ORAL DAILY
Qty: 90 TABLET | Refills: 3 | Status: SHIPPED | OUTPATIENT
Start: 2017-10-02 | End: 2018-09-11
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
## 2017-10-02 NOTE — PROGRESS NOTES
HPI and Plan: #[id number]  See dictation    Orders Placed This Encounter   Procedures     Follow-Up with Cardiac Advanced Practice Provider       Orders Placed This Encounter   Medications     amLODIPine (NORVASC) 10 MG tablet     Sig: Take 1 tablet (10 mg) by mouth daily     Dispense:  90 tablet     Refill:  3       Medications Discontinued During This Encounter   Medication Reason     amLODIPine (NORVASC) 5 MG tablet Reorder         Encounter Diagnosis   Name Primary?     Hypertension goal BP (blood pressure) < 140/90 - new diagnosis 11/8/2013- side fx with lisinopril & metoprolol - doesn't want medications        CURRENT MEDICATIONS:  Current Outpatient Prescriptions   Medication Sig Dispense Refill     amLODIPine (NORVASC) 10 MG tablet Take 1 tablet (10 mg) by mouth daily 90 tablet 3     Omega-3 Fatty Acids (FISH OIL) 600 MG CAPS Take 2,400 mg by mouth daily        CRESTOR 5 MG tablet Take 1 tablet (5 mg) by mouth daily 90 tablet 3     SYNTHROID 112 MCG tablet Take 1 tablet (112 mcg) by mouth daily 90 tablet 3     calcium carbonate-vitamin D (CALCIUM + D) 600-200 MG-UNIT TABS Take 2 tablets by mouth daily        [DISCONTINUED] amLODIPine (NORVASC) 5 MG tablet Take 1.5 tablets (7.5 mg) by mouth daily 135 tablet 4       ALLERGIES     Allergies   Allergen Reactions     Lisinopril Other (See Comments)     lisinopril 5mg= severe vertigo     Metoprolol      Ringing in ears & extremities feeling cold       PAST MEDICAL HISTORY:  Past Medical History:   Diagnosis Date     Anxiety state, unspecified      ASCUS favor benign 2011    neg HPV     Hyperlipidemia LDL goal < 130     lipitor - fatigue, niaspan - some hot flashes. simvastatin = stomach upset' And pravastatin = muscle aches.        Hypertension goal BP (blood pressure) < 140/90     lisinopril 5mg= severe vertigo; metoprolol 25mg=ringing ears/cold extremities      Low back ache     sees Dr. Dudley Lind - Chiropractor      Other forms of migraine, with intractable  migraine, so stated, without mention of status migrainosus      Symptomatic menopausal or female climacteric states     bad hot flashes      Unspecified hypothyroidism        PAST SURGICAL HISTORY:  Past Surgical History:   Procedure Laterality Date     HC TOOTH EXTRACTION W/FORCEP      wisdom       FAMILY HISTORY:  Family History   Problem Relation Age of Onset     C.A.D. Father      MI at 71,  of prostate Cancer     CANCER Father      prostate cancer     Lipids Mother      Thyroid Disease Mother      DIABETES Mother      Hypertension Mother      Hyperlipidemia Mother      CEREBROVASCULAR DISEASE Mother      Hyperlipidemia Brother      Hypertension Brother      Breast Cancer Sister      Thyroid Disease Sister      Breast Cancer Sister      Thyroid Disease Sister      Thyroid Disease Sister      Thyroid Disease Sister      Cancer - colorectal No family hx of        SOCIAL HISTORY:  Social History     Social History     Marital status:      Spouse name: Malik      Number of children: 2     Years of education: 18     Occupational History     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 12 oz of tea per day      Sleep Concern No     Stress Concern No     Special Diet No     Exercise Yes     30 minutes daily     Seat Belt Yes     Self-Exams Yes     Parent/Sibling W/ Cabg, Mi Or Angioplasty Before 65f 55m? No     Social History Narrative    Getting a master's degree in Education        Is a  since  .  Has 2 kids ages `17 and 19 - noted in     Brett 23 and Slade 21 - noted 2016        Review of Systems:  Skin:  Negative       Eyes:  Positive  "for glasses reading glasses  ENT:  Positive for tinnitus off and on ringing in ears -  basically left ear   Respiratory:  Negative       Cardiovascular:  Negative      Gastroenterology: Negative      Genitourinary:  Negative      Musculoskeletal:  Negative   sometimes it feels like her muscles are going to cramp up  -  but they don't   Neurologic:  Negative      Psychiatric:  Negative      Heme/Lymph/Imm:  Positive for allergies possible seasonal allergies  Endocrine:  Positive for thyroid disorder      Physical Exam:  Vitals: /90 (BP Location: Right arm, Patient Position: Sitting, Cuff Size: Adult Regular)  Pulse 98  Ht 1.6 m (5' 3\")  Wt 80.7 kg (178 lb)  LMP 05/15/2015  BMI 31.53 kg/m2    Constitutional:  cooperative, alert and oriented, well developed, well nourished, in no acute distress overweight      Skin:  warm and dry to the touch        Head:  normocephalic        Eyes:  pupils equal and round        ENT:  no pallor or cyanosis        Neck:  carotid pulses are full and equal bilaterally        Chest:  clear to auscultation;normal symmetry          Cardiac: regular rhythm       systolic ejection murmur;RUSB;LUSB          Abdomen:  abdomen soft;no bruits        Vascular: pulses full and equal                                        Extremities and Back:  no deformities, clubbing, cyanosis, erythema observed   trace;bilateral LE edema          Neurological:  affect appropriate, oriented to time, person and place;no gross motor deficits              MARGO Ellsworth CNP  [address]  [place] [id number]                  "

## 2017-10-02 NOTE — MR AVS SNAPSHOT
After Visit Summary   10/2/2017    June Nunn    MRN: [id number]           Patient Information     Date Of Birth          1960        Visit Information        Provider Department      10/2/2017 2:30 PM Nihsa Cyr APRN CNP [facility] HEART AT [place]        Today's Diagnoses     Hypertension goal BP (blood pressure) < 140/90 - new diagnosis 11/8/2013- side fx with lisinopril & metoprolol - doesn't want medications           Follow-ups after your visit        Additional Services     Follow-Up with Cardiac Advanced Practice Provider                 Future tests that were ordered for you today     Open Future Orders        Priority Expected Expires Ordered    Follow-Up with Cardiac Advanced Practice Provider Routine 12/31/2017 10/2/2018 10/2/2017            Who to contact     If you have questions or need follow up information about today's clinic visit or your schedule please contact [facility] directly at [phone].  Normal or non-critical lab and imaging results will be communicated to you by InvenSensehart, letter or phone within 4 business days after the clinic has received the results. If you do not hear from us within 7 days, please contact the clinic through Agile Sciencest or phone. If you have a critical or abnormal lab result, we will notify you by phone as soon as possible.  Submit refill requests through Wututu or call your pharmacy and they will forward the refill request to us. Please allow 3 business days for your refill to be completed.          Additional Information About Your Visit        MyChart Information     Wututu gives you secure access to your electronic health record. If you see a primary care provider, you can also send messages to your care team and make appointments. If you have questions, please call your primary care clinic.  If you do not have a primary care provider, please call [phone] and they  "will assist you.        Care EveryWhere ID     This is your Care EveryWhere ID. This could be used by other organizations to access your [place] medical records  [id number]        Your Vitals Were     Pulse Height Last Period BMI (Body Mass Index)          98 1.6 m (5' 3\") 05/15/2015 31.53 kg/m2         Blood Pressure from Last 3 Encounters:   10/02/17 142/90   08/23/17 158/80   07/26/17 (!) 174/100    Weight from Last 3 Encounters:   10/02/17 80.7 kg (178 lb)   08/23/17 80.7 kg (178 lb)   07/26/17 80.7 kg (178 lb)              We Performed the Following     Follow-Up with Cardiac Advanced Practice Provider          Today's Medication Changes          These changes are accurate as of: 10/2/17  3:15 PM.  If you have any questions, ask your nurse or doctor.               These medicines have changed or have updated prescriptions.        Dose/Directions    amLODIPine 10 MG tablet   Commonly known as:  NORVASC   This may have changed:    - medication strength  - how much to take   Used for:  Hypertension goal BP (blood pressure) < 140/90   Changed by:  Nisha Cyr, MARGO CNP        Dose:  10 mg   Take 1 tablet (10 mg) by mouth daily   Quantity:  90 tablet   Refills:  3            Where to get your medicines      These medications were sent to [facility] [place] [address]     Phone:  [phone]     amLODIPine 10 MG tablet                Primary Care Provider Office Phone # Fax #    Yolandawin Carter [phone]       [address] [place], [place] [address]        Equal Access to Services     KRAIG ONEAL AH: Hadii willis arciniega Sojoaquina, waaxda luqadaha, qaybta kaalmada oleg, hannah chambers. So [facility] [phone].    ATENCIÓN: Si habla español, tiene a joseph disposición servicios gratuitos de asistencia lingüística. Llame al [phone].    We comply with applicable " federal civil rights laws and [place] laws. We do not discriminate on the basis of race, color, national origin, age, disability, sex, sexual orientation, or gender identity.            Thank you!     Thank you for choosing [facility] AT [place]  for your care. Our goal is always to provide you with excellent care. Hearing back from our patients is one way we can continue to improve our services. Please take a few minutes to complete the written survey that you may receive in the mail after your visit with us. Thank you!             Your Updated Medication List - Protect others around you: Learn how to safely use, store and throw away your medicines at www.disposemymeds.org.          This list is accurate as of: 10/2/17  3:15 PM.  Always use your most recent med list.                   Brand Name Dispense Instructions for use Diagnosis    amLODIPine 10 MG tablet    NORVASC    90 tablet    Take 1 tablet (10 mg) by mouth daily    Hypertension goal BP (blood pressure) < 140/90       calcium + D 600-200 MG-UNIT Tabs   Generic drug:  calcium carbonate-vitamin D      Take 2 tablets by mouth daily        CRESTOR 5 MG tablet   Generic drug:  rosuvastatin     90 tablet    Take 1 tablet (5 mg) by mouth daily    Hyperlipidemia with target LDL less than 130       Fish Oil 600 MG Caps      Take 2,400 mg by mouth daily        SYNTHROID 112 MCG tablet   Generic drug:  levothyroxine     90 tablet    Take 1 tablet (112 mcg) by mouth daily    Hypothyroidism, unspecified

## 2017-11-01 ENCOUNTER — TELEPHONE (OUTPATIENT)
Dept: LAB | Facility: CLINIC | Age: 57
End: 2017-11-01

## 2017-11-01 DIAGNOSIS — R74.8 ELEVATED LIVER ENZYMES: ICD-10-CM

## 2017-11-01 DIAGNOSIS — E03.9 HYPOTHYROIDISM, UNSPECIFIED TYPE: ICD-10-CM

## 2017-11-01 DIAGNOSIS — E78.5 HYPERLIPIDEMIA WITH TARGET LDL LESS THAN 130: Primary | ICD-10-CM

## 2017-11-01 DIAGNOSIS — Z83.3 FAMILY HISTORY OF DIABETES MELLITUS: ICD-10-CM

## 2017-11-01 DIAGNOSIS — R63.8 INCREASED BMI: ICD-10-CM

## 2017-11-01 DIAGNOSIS — R73.01 ELEVATED FASTING GLUCOSE: ICD-10-CM

## 2017-11-01 DIAGNOSIS — I10 ESSENTIAL HYPERTENSION WITH GOAL BLOOD PRESSURE LESS THAN 140/90: ICD-10-CM

## 2017-11-01 NOTE — TELEPHONE ENCOUNTER
Reason for Call:  Patient coming 12/11 for pre-physical labs.  Please place orders or advise.    Adelaide Dodson

## 2017-11-18 ENCOUNTER — TELEPHONE (OUTPATIENT)
Dept: FAMILY MEDICINE | Facility: CLINIC | Age: 57
End: 2017-11-18

## 2017-11-18 NOTE — TELEPHONE ENCOUNTER
11/18/2017    Call Regarding Preventive Health Screening Mammogram    Attempt 1    Message patient states she does not get mammo done through [facility] - no further attempts needed    Comments:       Outreach   Salome Burgos       CARDIOLOGY PROGRESS NOTE   ([facility])                             Reason for follow up:                             Overnight: No new events.   Update:     Subjective:, feels uncomfortable from distended abdomen    Review of symptoms: Cardiac:  No chest pain. No dyspnea. No palpitations.  Respiratory:no cough. No dyspnea      Past medical history: No updates.  	  Vitals:  T(C): 36.8, Max: 36.8 (01-06 @ 12:53)  HR: 78 (52 - 81)  BP: 156/86 (148/52 - 180/110)  RR: 17 (16 - 18)  SpO2: 95% (95% - 98%)  Wt(kg): --  I&O's Summary    Weight (kg): 113.4 (01-04 @ 21:33)    PHYSICAL EXAM:  Appearance: Comfortable.looks anxious  HEENT:  Head and neck: Atraumatic. Normocephalic.  Normal oral mucosa, PERRL, elevated JVD with systolic expansion.   Neurologic: A & O x 3, no focal deficits. EOMI ,   Lymphatic: No cervical lymphadenopathy  Cardiovascular: Normal S1 S2, TR murmur  Respiratory: Lungs clear to auscultation anteriorly  Gastrointestinal:  distended , ascites,  Lower Extremities: +1 edema  Psychiatry: Patient is calm. No agitation. Mood & affect appropriate  Skin: No rashes, No ecchymoses, No cyanosis    CURRENT MEDICATIONS:  tamsulosin 0.4milliGRAM(s) Oral at bedtime  isosorbide   mononitrate ER Tablet (IMDUR) 30milliGRAM(s) Oral daily  hydrALAZINE 10milliGRAM(s) Oral three times a day  metoprolol succinate ER 50milliGRAM(s) Oral daily    buDESOnide 160 MICROgram(s)/formoterol 4.5 MICROgram(s) Inhaler  citalopram  donepezil  pantoprazole    Tablet  atorvastatin  insulin lispro (HumaLOG) corrective regimen sliding scale  oxybutynin  heparin  Injectable  dextrose 5%.  ferrous    sulfate  sodium chloride 0.9%.  influenza   Vaccine  epoetin damon Injectable  folic acid      LABS:	 	  CARDIAC MARKERS:                          8.0    4.86  )-----------( 160      ( 04 Jan 2017 22:03 )             26.8     06 Jan 2017 08:53    136    |  102    |  59.0   ----------------------------<  105    5.4     |  21.0   |  2.53     Ca    8.5        06 Jan 2017 08:53    TPro  8.6    /  Alb  3.4    /  TBili  0.4    /  DBili  x      /  AST  44     /  ALT  20     /  AlkPhos  74     04 Jan 2017 22:03    proBNP: Serum Pro-Brain Natriuretic Peptide: 35188 pg/mL (01-04 @ 22:03)      TELEMETRY: 	  bradycardia, sinus rhythm, occasional junctional escape and pacing  :  	    DIAGNOSTIC TESTING:  Echo Date:  9/2016              : LVEF= 20-25%         ; RV function: moderately reduced      ; Valvular abnormalities:.  Mitral valve:   mild TR        ;  Aortic valve:   trivial AI           ;Tricuspid valve: wide open TR     ; Pulmonary pressures:  borderline PH.

## 2017-11-29 ENCOUNTER — TRANSFERRED RECORDS (OUTPATIENT)
Dept: HEALTH INFORMATION MANAGEMENT | Facility: CLINIC | Age: 57
End: 2017-11-29

## 2017-12-11 DIAGNOSIS — E03.9 HYPOTHYROIDISM, UNSPECIFIED TYPE: ICD-10-CM

## 2017-12-11 DIAGNOSIS — R63.8 INCREASED BMI: ICD-10-CM

## 2017-12-11 DIAGNOSIS — R74.8 ELEVATED LIVER ENZYMES: ICD-10-CM

## 2017-12-11 DIAGNOSIS — Z83.3 FAMILY HISTORY OF DIABETES MELLITUS: ICD-10-CM

## 2017-12-11 DIAGNOSIS — R73.01 ELEVATED FASTING GLUCOSE: ICD-10-CM

## 2017-12-11 DIAGNOSIS — E78.5 HYPERLIPIDEMIA WITH TARGET LDL LESS THAN 130: ICD-10-CM

## 2017-12-11 DIAGNOSIS — I10 ESSENTIAL HYPERTENSION WITH GOAL BLOOD PRESSURE LESS THAN 140/90: ICD-10-CM

## 2017-12-11 LAB
ALBUMIN SERPL-MCNC: 4.2 G/DL (ref 3.4–5)
ALBUMIN UR-MCNC: NEGATIVE MG/DL
ALP SERPL-CCNC: 72 U/L (ref 40–150)
ALT SERPL W P-5'-P-CCNC: 51 U/L (ref 0–50)
ANION GAP SERPL CALCULATED.3IONS-SCNC: 11 MMOL/L (ref 3–14)
APPEARANCE UR: CLEAR
AST SERPL W P-5'-P-CCNC: 42 U/L (ref 0–45)
BACTERIA #/AREA URNS HPF: ABNORMAL /HPF
BASOPHILS # BLD AUTO: 0 10E9/L (ref 0–0.2)
BASOPHILS NFR BLD AUTO: 0.6 %
BILIRUB SERPL-MCNC: 0.5 MG/DL (ref 0.2–1.3)
BILIRUB UR QL STRIP: NEGATIVE
BUN SERPL-MCNC: 18 MG/DL (ref 7–30)
CALCIUM SERPL-MCNC: 9.8 MG/DL (ref 8.5–10.1)
CHLORIDE SERPL-SCNC: 104 MMOL/L (ref 94–109)
CHOLEST SERPL-MCNC: 195 MG/DL
CO2 SERPL-SCNC: 25 MMOL/L (ref 20–32)
COLOR UR AUTO: YELLOW
CREAT SERPL-MCNC: 0.88 MG/DL (ref 0.52–1.04)
CREAT UR-MCNC: 90 MG/DL
DIFFERENTIAL METHOD BLD: NORMAL
EOSINOPHIL # BLD AUTO: 0.1 10E9/L (ref 0–0.7)
EOSINOPHIL NFR BLD AUTO: 1.7 %
ERYTHROCYTE [DISTWIDTH] IN BLOOD BY AUTOMATED COUNT: 11.9 % (ref 10–15)
GFR SERPL CREATININE-BSD FRML MDRD: 66 ML/MIN/1.7M2
GLUCOSE SERPL-MCNC: 88 MG/DL (ref 70–99)
GLUCOSE UR STRIP-MCNC: NEGATIVE MG/DL
HBA1C MFR BLD: 5.6 % (ref 4.3–6)
HCT VFR BLD AUTO: 43.8 % (ref 35–47)
HDLC SERPL-MCNC: 70 MG/DL
HGB BLD-MCNC: 14.9 G/DL (ref 11.7–15.7)
HGB UR QL STRIP: ABNORMAL
KETONES UR STRIP-MCNC: NEGATIVE MG/DL
LDLC SERPL CALC-MCNC: 106 MG/DL
LEUKOCYTE ESTERASE UR QL STRIP: ABNORMAL
LYMPHOCYTES # BLD AUTO: 1.8 10E9/L (ref 0.8–5.3)
LYMPHOCYTES NFR BLD AUTO: 34.4 %
MCH RBC QN AUTO: 30.8 PG (ref 26.5–33)
MCHC RBC AUTO-ENTMCNC: 34 G/DL (ref 31.5–36.5)
MCV RBC AUTO: 91 FL (ref 78–100)
MICROALBUMIN UR-MCNC: <5 MG/L
MICROALBUMIN/CREAT UR: NORMAL MG/G CR (ref 0–25)
MONOCYTES # BLD AUTO: 0.6 10E9/L (ref 0–1.3)
MONOCYTES NFR BLD AUTO: 11.4 %
NEUTROPHILS # BLD AUTO: 2.7 10E9/L (ref 1.6–8.3)
NEUTROPHILS NFR BLD AUTO: 51.9 %
NITRATE UR QL: NEGATIVE
NON-SQ EPI CELLS #/AREA URNS LPF: ABNORMAL /LPF
NONHDLC SERPL-MCNC: 125 MG/DL
PH UR STRIP: 7 PH (ref 5–7)
PLATELET # BLD AUTO: 249 10E9/L (ref 150–450)
POTASSIUM SERPL-SCNC: 4.6 MMOL/L (ref 3.4–5.3)
PROT SERPL-MCNC: 8 G/DL (ref 6.8–8.8)
RBC # BLD AUTO: 4.83 10E12/L (ref 3.8–5.2)
RBC #/AREA URNS AUTO: ABNORMAL /HPF
SODIUM SERPL-SCNC: 140 MMOL/L (ref 133–144)
SOURCE: ABNORMAL
SP GR UR STRIP: 1.01 (ref 1–1.03)
T3FREE SERPL-MCNC: 2.8 PG/ML (ref 2.3–4.2)
T4 FREE SERPL-MCNC: 1.25 NG/DL (ref 0.76–1.46)
TRIGL SERPL-MCNC: 93 MG/DL
TSH SERPL DL<=0.005 MIU/L-ACNC: 4.02 MU/L (ref 0.4–4)
UROBILINOGEN UR STRIP-ACNC: 0.2 EU/DL (ref 0.2–1)
WBC # BLD AUTO: 5.2 10E9/L (ref 4–11)
WBC #/AREA URNS AUTO: ABNORMAL /HPF

## 2017-12-11 PROCEDURE — 84439 ASSAY OF FREE THYROXINE: CPT | Performed by: FAMILY MEDICINE

## 2017-12-11 PROCEDURE — 36415 COLL VENOUS BLD VENIPUNCTURE: CPT | Performed by: FAMILY MEDICINE

## 2017-12-11 PROCEDURE — 80061 LIPID PANEL: CPT | Performed by: FAMILY MEDICINE

## 2017-12-11 PROCEDURE — 82043 UR ALBUMIN QUANTITATIVE: CPT | Performed by: FAMILY MEDICINE

## 2017-12-11 PROCEDURE — 81001 URINALYSIS AUTO W/SCOPE: CPT | Performed by: FAMILY MEDICINE

## 2017-12-11 PROCEDURE — 84481 FREE ASSAY (FT-3): CPT | Performed by: FAMILY MEDICINE

## 2017-12-11 PROCEDURE — 80050 GENERAL HEALTH PANEL: CPT | Performed by: FAMILY MEDICINE

## 2017-12-11 PROCEDURE — 83036 HEMOGLOBIN GLYCOSYLATED A1C: CPT | Performed by: FAMILY MEDICINE

## 2017-12-14 ENCOUNTER — OFFICE VISIT (OUTPATIENT)
Dept: FAMILY MEDICINE | Facility: CLINIC | Age: 57
End: 2017-12-14
Payer: COMMERCIAL

## 2017-12-14 VITALS
OXYGEN SATURATION: 97 % | BODY MASS INDEX: 31.79 KG/M2 | HEART RATE: 104 BPM | WEIGHT: 179.4 LBS | SYSTOLIC BLOOD PRESSURE: 138 MMHG | HEIGHT: 63 IN | TEMPERATURE: 98.1 F | DIASTOLIC BLOOD PRESSURE: 82 MMHG

## 2017-12-14 DIAGNOSIS — Z23 NEED FOR PROPHYLACTIC VACCINATION AND INOCULATION AGAINST INFLUENZA: ICD-10-CM

## 2017-12-14 DIAGNOSIS — I10 ESSENTIAL HYPERTENSION WITH GOAL BLOOD PRESSURE LESS THAN 140/90: ICD-10-CM

## 2017-12-14 DIAGNOSIS — E78.5 HYPERLIPIDEMIA WITH TARGET LDL LESS THAN 130: ICD-10-CM

## 2017-12-14 DIAGNOSIS — E03.9 HYPOTHYROIDISM, UNSPECIFIED TYPE: ICD-10-CM

## 2017-12-14 DIAGNOSIS — Z00.01 ENCOUNTER FOR ROUTINE ADULT MEDICAL EXAM WITH ABNORMAL FINDINGS: Primary | ICD-10-CM

## 2017-12-14 PROCEDURE — 90471 IMMUNIZATION ADMIN: CPT | Performed by: FAMILY MEDICINE

## 2017-12-14 PROCEDURE — 90686 IIV4 VACC NO PRSV 0.5 ML IM: CPT | Performed by: FAMILY MEDICINE

## 2017-12-14 PROCEDURE — 99214 OFFICE O/P EST MOD 30 MIN: CPT | Mod: 25 | Performed by: FAMILY MEDICINE

## 2017-12-14 PROCEDURE — 99396 PREV VISIT EST AGE 40-64: CPT | Mod: 25 | Performed by: FAMILY MEDICINE

## 2017-12-14 RX ORDER — ROSUVASTATIN CALCIUM 5 MG
5 TABLET ORAL DAILY
Qty: 90 TABLET | Refills: 3 | Status: SHIPPED | OUTPATIENT
Start: 2017-12-14 | End: 2018-10-02

## 2017-12-14 RX ORDER — LEVOTHYROXINE SODIUM 112 MCG
112 TABLET ORAL DAILY
Qty: 45 TABLET | Refills: 1 | Status: SHIPPED | OUTPATIENT
Start: 2017-12-14 | End: 2018-03-14

## 2017-12-14 RX ORDER — HYDROCHLOROTHIAZIDE 25 MG/1
12.5 TABLET ORAL DAILY
Qty: 15 TABLET | Refills: 6 | Status: SHIPPED | OUTPATIENT
Start: 2017-12-14 | End: 2018-07-10

## 2017-12-14 ASSESSMENT — ANXIETY QUESTIONNAIRES
3. WORRYING TOO MUCH ABOUT DIFFERENT THINGS: NOT AT ALL
GAD7 TOTAL SCORE: 0
1. FEELING NERVOUS, ANXIOUS, OR ON EDGE: NOT AT ALL
5. BEING SO RESTLESS THAT IT IS HARD TO SIT STILL: NOT AT ALL
7. FEELING AFRAID AS IF SOMETHING AWFUL MIGHT HAPPEN: NOT AT ALL
6. BECOMING EASILY ANNOYED OR IRRITABLE: NOT AT ALL
2. NOT BEING ABLE TO STOP OR CONTROL WORRYING: NOT AT ALL

## 2017-12-14 ASSESSMENT — PATIENT HEALTH QUESTIONNAIRE - PHQ9
SUM OF ALL RESPONSES TO PHQ QUESTIONS 1-9: 0
5. POOR APPETITE OR OVEREATING: NOT AT ALL

## 2017-12-14 NOTE — MR AVS SNAPSHOT
After Visit Summary   12/14/2017    June Nunn    MRN: [id number]           Patient Information     Date Of Birth          1960        Visit Information        Provider Department      12/14/2017 3:15 PM Yolanda Carter MD [facility] [place]        Today's Diagnoses     Encounter for routine adult medical exam with abnormal findings    -  1    Essential hypertension with goal blood pressure less than 140/90-new diagnosis 11/8/2013- side fx with lisinopril & metoprolol -doesn't like to take meds        Need for prophylactic vaccination and inoculation against influenza        Hyperlipidemia with target LDL less than 130        Hypothyroidism, unspecified type- TSH = 4.02 12/11/2017           Care Instructions    Eat a banana or kiwi daily for potassium . Recheck your blood pressure in our pharmacy in 1 week or so if needed.  Have pharmacy send me their note.  Pt would like to wait on increasing synthroid to 125mcg -  Recheck tsh, free t4, total t3 in 4-6 weeks. Please  enter future orders for this and pt  can do this as a lab only appointment.       Preventive Health Recommendations  Female Ages 50 - 64    Yearly exam: See your health care provider every year in order to  o Review health changes.   o Discuss preventive care.    o Review your medicines if your doctor has prescribed any.      Get a Pap test every three years (unless you have an abnormal result and your provider advises testing more often).    If you get Pap tests with HPV test, you only need to test every 5 years, unless you have an abnormal result.     You do not need a Pap test if your uterus was removed (hysterectomy) and you have not had cancer.    You should be tested each year for STDs (sexually transmitted diseases) if you're at risk.     Have a mammogram every 1 to 2 years.    Have a colonoscopy at age 50, or have a yearly FIT test (stool test). These exams screen for colon cancer.      Have a  cholesterol test every 5 years, or more often if advised.    Have a diabetes test (fasting glucose) every three years. If you are at risk for diabetes, you should have this test more often.     If you are at risk for osteoporosis (brittle bone disease), think about having a bone density scan (DEXA).    Shots: Get a flu shot each year. Get a tetanus shot every 10 years.    Nutrition:     Eat at least 5 servings of fruits and vegetables each day.    Eat whole-grain bread, whole-wheat pasta and brown rice instead of white grains and rice.    Talk to your provider about Calcium and Vitamin D.     Lifestyle    Exercise at least 150 minutes a week (30 minutes a day, 5 days a week). This will help you control your weight and prevent disease.    Limit alcohol to one drink per day.    No smoking.     Wear sunscreen to prevent skin cancer.     See your dentist every six months for an exam and cleaning.    See your eye doctor every 1 to 2 years.               Thank you for choosing [facility]  for your Health Care. It was a pleasure seeing you at your visit today. Please contact us with any questions or concerns you may have.                   Yolanda Carter MD                                  To reach your [facility] care team after hours call:   [phone]    Our clinic hours are:     Monday- 7:30 am - 7:00 pm                             Tuesday through Friday- 7:30 am - 5:00 pm                                        Saturday- 8:00 am - 12:00 pm                  Phone:  [phone]    Our pharmacy hours are:     Monday  8:00 am to 7:00 pm      Tuesday through Friday 8:00am to 6:00pm                        Saturday - 9:00 am to 1:00 pm      Sunday : Closed.              Phone:  [phone]      There is also information available at our web site:  [email or web address]    If your provider ordered any lab tests and you do not receive the results within 10 business days, please call  the clinic.    If you need a medication refill please contact your pharmacy.  Please allow 2 business days for your refill to be completed.    Our clinic offers telephone visits and e visits.  Please ask one of your team members to explain more.      Use Ambarella (secure email communication and access to your chart) to send your primary care provider a message or make an appointment. Ask someone on your Team how to sign up for Nativeflowt.                         Follow-ups after your visit        Future tests that were ordered for you today     Open Future Orders        Priority Expected Expires Ordered    Basic metabolic panel Routine 1/14/2018 3/14/2018 12/14/2017    T4 free Routine 1/14/2018 3/14/2018 12/14/2017    TSH Routine 1/14/2018 3/14/2018 12/14/2017    T3 total Routine 1/14/2018 3/14/2018 12/14/2017            Who to contact     If you have questions or need follow up information about today's clinic visit or your schedule please contact [facility] directly at [phone].  Normal or non-critical lab and imaging results will be communicated to you by MyChart, letter or phone within 4 business days after the clinic has received the results. If you do not hear from us within 7 days, please contact the clinic through Yeelionhart or phone. If you have a critical or abnormal lab result, we will notify you by phone as soon as possible.  Submit refill requests through Ambarella or call your pharmacy and they will forward the refill request to us. Please allow 3 business days for your refill to be completed.          Additional Information About Your Visit        Yeelionhart Information     Ambarella gives you secure access to your electronic health record. If you see a primary care provider, you can also send messages to your care team and make appointments. If you have questions, please call your primary care clinic.  If you do not have a primary care provider, please call [phone] and they will assist  "you.        Care EveryWhere ID     This is your Care EveryWhere ID. This could be used by other organizations to access your [place] medical records  [id number]        Your Vitals Were     Pulse Temperature Height Last Period Pulse Oximetry BMI (Body Mass Index)    104 98.1  F (36.7  C) (Oral) 5' 3\" (1.6 m) 05/15/2015 97% 31.78 kg/m2       Blood Pressure from Last 3 Encounters:   12/14/17 138/82   10/02/17 142/90   08/23/17 158/80    Weight from Last 3 Encounters:   12/14/17 179 lb 6.4 oz (81.4 kg)   10/02/17 178 lb (80.7 kg)   08/23/17 178 lb (80.7 kg)              We Performed the Following          ADMIN VACCINE, FIRST [71830]     HC FLU VAC PRESRV FREE QUAD SPLIT VIR 3+YRS IM  [61497]     OFFICE/OUTPT VISIT,EST,LEVBEVERLY IV          Today's Medication Changes          These changes are accurate as of: 12/14/17  4:20 PM.  If you have any questions, ask your nurse or doctor.               Start taking these medicines.        Dose/Directions    hydrochlorothiazide 25 MG tablet   Commonly known as:  HYDRODIURIL   Used for:  Essential hypertension with goal blood pressure less than 140/90   Started by:  Yolanda Carter MD        Dose:  12.5 mg   Take 0.5 tablets (12.5 mg) by mouth daily   Quantity:  15 tablet   Refills:  6            Where to get your medicines      These medications were sent to [facility] [address] [address]     Phone:  [phone]     CRESTOR 5 MG tablet    hydrochlorothiazide 25 MG tablet    SYNTHROID 112 MCG tablet                Primary Care Provider Office Phone # Fax #    Yolanda Carter [phone]       [address] [place] [address]        Equal Access to Services     KRAIG ONEAL AH: Michelle arciniega Sojoaquina, waallieda luqadaha, qaybta kaalhannah whittaker. [facility] [phone].    ATENCIÓN: Si christian hunt " joseph disposición servicios gratuitos de asistencia lingüística. Tico bunch [phone].    We comply with applicable federal civil rights laws and [place] laws. We do not discriminate on the basis of race, color, national origin, age, disability, sex, sexual orientation, or gender identity.            Thank you!     Thank you for choosing [facility]  for your care. Our goal is always to provide you with excellent care. Hearing back from our patients is one way we can continue to improve our services. Please take a few minutes to complete the written survey that you may receive in the mail after your visit with us. Thank you!             Your Updated Medication List - Protect others around you: Learn how to safely use, store and throw away your medicines at www.disposemymeds.org.          This list is accurate as of: 12/14/17  4:20 PM.  Always use your most recent med list.                   Brand Name Dispense Instructions for use Diagnosis    amLODIPine 10 MG tablet    NORVASC    90 tablet    Take 1 tablet (10 mg) by mouth daily    Hypertension goal BP (blood pressure) < 140/90       calcium + D 600-200 MG-UNIT Tabs   Generic drug:  calcium carbonate-vitamin D      Take 2 tablets by mouth daily        CRESTOR 5 MG tablet   Generic drug:  rosuvastatin     90 tablet    Take 1 tablet (5 mg) by mouth daily    Hyperlipidemia with target LDL less than 130       Fish Oil 600 MG Caps      Take 2,400 mg by mouth daily        hydrochlorothiazide 25 MG tablet    HYDRODIURIL    15 tablet    Take 0.5 tablets (12.5 mg) by mouth daily    Essential hypertension with goal blood pressure less than 140/90       SYNTHROID 112 MCG tablet   Generic drug:  levothyroxine     45 tablet    Take 1 tablet (112 mcg) by mouth daily    Hypothyroidism, unspecified type

## 2017-12-14 NOTE — NURSING NOTE
"Chief Complaint   Patient presents with     Physical       Initial /88 (BP Location: Left arm, Patient Position: Chair, Cuff Size: Adult Large)  Pulse 104  Temp 98.1  F (36.7  C) (Oral)  Ht 5' 3\" (1.6 m)  Wt 179 lb 6.4 oz (81.4 kg)  LMP 05/15/2015  SpO2 97%  BMI 31.78 kg/m2 Estimated body mass index is 31.78 kg/(m^2) as calculated from the following:    Height as of this encounter: 5' 3\" (1.6 m).    Weight as of this encounter: 179 lb 6.4 oz (81.4 kg).  Medication Reconciliation: complete   Yanelis Clark CMA  "

## 2017-12-14 NOTE — PROGRESS NOTES
SUBJECTIVE:   CC: June Nunn is an 57 year old woman who presents for preventive health visit.     Healthy Habits:    Do you get at least three servings of calcium containing foods daily (dairy, green leafy vegetables, etc.)? yes    Amount of exercise or daily activities, outside of work: 7 day(s) per week, 45-60 minutes    Problems taking medications regularly No    Medication side effects: Yes - amlodipine, having buzzing in ear     Have you had an eye exam in the past two years? Will be having an appointment soon    Do you see a dentist twice per year? yes    Do you have sleep apnea, excessive snoring or daytime drowsiness?no      Hyperlipidemia Follow-Up:       Rate your low fat/cholesterol diet?: fair    Taking statin?  Yes, Crestor, muscle aches after starting statin - seeing Dr. CHINYERE Clark, [facility], [facility].     Other lipid medications/supplements?:  Fish oil/Omega 3, without side effects    Recent Labs   Lab Test  12/11/17   0749  11/16/16   0831   10/08/15   0819  03/05/14   0921   CHOL  195  197   < >  274*  208*   HDL  70  60   < >  55  52   LDL  106*  117*   < >  196*  134*   TRIG  93  100   < >  115  112   CHOLHDLRATIO   --    --    --   5.0  4.0    < > = values in this interval not displayed.          Lab Results   Component Value Date    CHOL 195 12/11/2017    CHOL 197 11/16/2016     Lab Results   Component Value Date    HDL 70 12/11/2017    HDL 60 11/16/2016     Lab Results   Component Value Date     12/11/2017     11/16/2016     Lab Results   Component Value Date    TRIG 93 12/11/2017    TRIG 100 11/16/2016     Lab Results   Component Value Date    CHOLHDLRATIO 5.0 10/08/2015    CHOLHDLRATIO 4.0 03/05/2014       Hypertension Follow-up      Outpatient blood pressures are being checked at home.  Results are within normal range.    Low Salt Diet: low salt    BP Readings from Last 5 Encounters:   12/14/17 138/82   10/02/17 142/90   08/23/17 158/80   07/26/17 (!) 174/100    12/07/16 144/84       Hypothyroidism Follow-up:     Since last visit, patient describes the following symptoms: Weight stable, no hair loss, no skin changes, no constipation, no loose stools.      TSH   Date Value Ref Range Status   12/11/2017 4.02 (H) 0.40 - 4.00 mU/L Final   11/16/2016 1.28 0.40 - 4.00 mU/L Final   12/31/2015 1.45 0.40 - 4.00 mU/L Final   10/08/2015 6.39 (H) 0.40 - 4.00 mU/L Final   11/08/2013 3.80 0.4 - 5.0 mU/L Final       T4 Free, Non-Dialysis   Date Value Ref Range Status   10/26/2012 1.3 0.8 - 1.8 ng/dL Final   10/26/2011 1.2 0.8 - 1.8 ng/dL Final     Comment:     Test Performed at:  [facility] [address]  [address]  MARCUS SHEPHERD MD   10/06/2010 1.3 0.8 - 1.8 ng/dL Final     Comment:     Test Performed at:  [facility] [address]  [address]  MARCUS SHEPHERD MD   05/06/2010 1.1 0.8 - 1.8 ng/dL Final     Comment:     Test Performed at:  [facility] [address]  [address]  MARCUS SHEPHERD MD   10/01/2009 1.1 0.8 - 1.8 ng/dL Final     Comment:     NO COLLECTION DATE RECEIVED. WE HAVE USED  THE DATE THE SPECIMEN WAS RECEIVED BY THIS  LABORATORY AS THE COLLECTION DATE. IF THIS  IS INCORRECT, PLEASE CONTACT CLIENT SERVICES.  PHONE NUMBER: [phone]  Test Performed at:  [facility] [address]  [address]  MARCUS SHEPHERD M.D.     T4 Free   Date Value Ref Range Status   12/11/2017 1.25 0.76 - 1.46 ng/dL Final   11/16/2016 1.29 0.76 - 1.46 ng/dL Final   12/31/2015 1.27 0.76 - 1.46 ng/dL Final   10/08/2015 1.15 0.76 - 1.46 ng/dL Final   11/08/2013 1.49 0.70 - 1.85 ng/dL Final       Today's PHQ-2 Score:   PHQ-2 ( 1999 [employer]) 12/14/2017 12/6/2016   Q1: Little interest or pleasure in doing things 0 -   Q2: Feeling down, depressed or hopeless 0 -   PHQ-2 Score 0 -   Q1: Little interest or pleasure in doing things - Not at all    Q2: Feeling down, depressed or hopeless - Not at all   PHQ-2 Score - 0       Abuse: Current or Past(Physical, Sexual or Emotional)- No  Do you feel safe in your environment - Yes      Social History   Substance Use Topics     Smoking status: Never Smoker     Smokeless tobacco: Never Used     Alcohol use 1.0 oz/week      Comment: occasional beer      If you drink alcohol do you typically have >3 drinks per day or >7 drinks per week? No                     Reviewed orders with patient.  Reviewed health maintenance and updated orders accordingly - Yes  BP Readings from Last 3 Encounters:   12/14/17 138/82   10/02/17 142/90   08/23/17 158/80    Wt Readings from Last 3 Encounters:   12/14/17 179 lb 6.4 oz (81.4 kg)   10/02/17 178 lb (80.7 kg)   08/23/17 178 lb (80.7 kg)            bp's at home = 140's/80's. Dr. Clark's CNP, Nisha Cyr - recommended that she increase her amlodipine to 10mg daily.  From 7.5mg on 10/2/2017.      Patient Active Problem List   Diagnosis     Hypothyroidism, unspecified type     Abdominal pain, epigastric     Simple goiter     Health Care Home     Living will, counseling/discussion     Heart murmur     Symptomatic menopausal or female climacteric states     Hyperlipidemia with target LDL less than 130     Essential hypertension with goal blood pressure less than 140/90-new diagnosis 11/8/2013- side fx with lisinopril & metoprolol -doesn't want meds     Elevated liver enzymes- very mild 11/8/2013     Bilateral sensorineural hearing loss - left > right     Medication adverse effect     Non morbid obesity due to excess calories     Increased BMI     Past Surgical History:   Procedure Laterality Date     HC TOOTH EXTRACTION W/FORCEP      wisdom       Social History   Substance Use Topics     Smoking status: Never Smoker     Smokeless tobacco: Never Used     Alcohol use 1.0 oz/week      Comment: occasional beer      Family History   Problem Relation Age of Onset     C.A.D. Father      MI at 71,   of prostate Cancer     CANCER Father      prostate cancer     Lipids Mother      Thyroid Disease Mother      DIABETES Mother      type 2 diabetes      Hypertension Mother      Hyperlipidemia Mother      CEREBROVASCULAR DISEASE Mother 82     stroke from A fib      Hyperlipidemia Brother      Hypertension Brother      Breast Cancer Sister      Thyroid Disease Sister      Breast Cancer Sister      Thyroid Disease Sister      Thyroid Disease Sister      Thyroid Disease Sister      Cancer - colorectal No family hx of          Current Outpatient Prescriptions   Medication Sig Dispense Refill     CRESTOR 5 MG tablet Take 1 tablet (5 mg) by mouth daily 90 tablet 3     SYNTHROID 112 MCG tablet Take 1 tablet (112 mcg) by mouth daily 45 tablet 1     hydrochlorothiazide (HYDRODIURIL) 25 MG tablet Take 0.5 tablets (12.5 mg) by mouth daily 15 tablet 6     amLODIPine (NORVASC) 10 MG tablet Take 1 tablet (10 mg) by mouth daily 90 tablet 3     Omega-3 Fatty Acids (FISH OIL) 600 MG CAPS Take 2,400 mg by mouth daily        calcium carbonate-vitamin D (CALCIUM + D) 600-200 MG-UNIT TABS Take 2 tablets by mouth daily        [DISCONTINUED] CRESTOR 5 MG tablet Take 1 tablet (5 mg) by mouth daily 90 tablet 3     [DISCONTINUED] SYNTHROID 112 MCG tablet Take 1 tablet (112 mcg) by mouth daily 90 tablet 3     Allergies   Allergen Reactions     Lisinopril Other (See Comments)     lisinopril 5mg= severe vertigo     Metoprolol      Ringing in ears & extremities feeling cold     Recent Labs   Lab Test  17   0749  10/02/17   1332  16   0831  12/31/15   0822  10/08/15   0819   A1C  5.6   --   5.5   --   5.3   LDL  106*   --   117*  104*  196*   HDL  70   --   60  64  55   TRIG  93   --   100  77  115   ALT  51*   --   38  63*  34   CR  0.88  0.97  0.88   --   0.86   GFRESTIMATED  66  59*  66   --   68   GFRESTBLACK  80  72  80   --   82   POTASSIUM  4.6  4.6  4.4   --   4.3   TSH  4.02*   --   1.28  1.45  6.39*      Mammogram:  Patient over age 50, mutual decision to screen reflected in health maintenance.    No results found.      History of abnormal Pap smear: NO - age 30- 65 PAP every 3 years recommended    Lab Results   Component Value Date    PAP NIL 2016    PAP NIL 2013    PAP OTHER-NIL EM>40 2007       Reviewed and updated as needed this visit by clinical staff  Tobacco  Allergies  Meds  Med Hx  Surg Hx  Fam Hx  Soc Hx        Reviewed and updated as needed this visit by Provider.   Tobacco  Allergies        Past Medical History:   Diagnosis Date     Anxiety state, unspecified      ASCUS favor benign     neg HPV     Hyperlipidemia LDL goal < 130     lipitor - fatigue, niaspan - some hot flashes. simvastatin = stomach upset' And pravastatin = muscle aches.        Hypertension goal BP (blood pressure) < 140/90     lisinopril 5mg= severe vertigo; metoprolol 25mg=ringing ears/cold extremities      Low back ache     sees Dr. Dudley Lind - Chiropractor      Other forms of migraine, with intractable migraine, so stated, without mention of status migrainosus      Symptomatic menopausal or female climacteric states     bad hot flashes      Unspecified hypothyroidism       Past Surgical History:   Procedure Laterality Date     HC TOOTH EXTRACTION W/FORCEP      wisdom     Obstetric History       T1      L2     SAB0   TAB0   Ectopic0   Multiple0   Live Births0       # Outcome Date GA Lbr Alexandre/2nd Weight Sex Delivery Anes PTL Lv   2             1 Term               Obstetric Comments    X 2.  1st pregnancy baby was premature.         Last Basic Metabolic Panel:  Lab Results   Component Value Date     2017      Lab Results   Component Value Date    POTASSIUM 4.6 2017     Lab Results   Component Value Date    CHLORIDE 104 2017     Lab Results   Component Value Date    NELLA 9.8 2017     Lab Results   Component Value Date    CO2 25 2017     Lab Results  "  Component Value Date    BUN 18 12/11/2017     Lab Results   Component Value Date    CR 0.88 12/11/2017     Lab Results   Component Value Date    GLC 88 12/11/2017       ROS:  C: NEGATIVE for fever, chills, change in weight  I: NEGATIVE for worrisome rashes, moles or lesions  E: NEGATIVE for vision changes or irritation  ENT: NEGATIVE for ear, mouth and throat problems  R: NEGATIVE for significant cough or SOB  B: NEGATIVE for masses, tenderness or discharge  CV: NEGATIVE for chest pain, palpitations or peripheral edema  GI: NEGATIVE for nausea, abdominal pain, heartburn, or change in bowel habits  : NEGATIVE for unusual urinary or vaginal symptoms. No vaginal bleeding.  M: NEGATIVE for significant arthralgias or myalgia  N: NEGATIVE for weakness, dizziness or paresthesias  P: NEGATIVE for changes in mood or affect     OBJECTIVE:   /82 (BP Location: Left arm, Patient Position: Chair, Cuff Size: Adult Regular)  Pulse 104  Temp 98.1  F (36.7  C) (Oral)  Ht 5' 3\" (1.6 m)  Wt 179 lb 6.4 oz (81.4 kg)  LMP 05/15/2015  SpO2 97%  BMI 31.78 kg/m2  EXAM:  GENERAL APPEARANCE: healthy, alert and no distress  EYES: Eyes grossly normal to inspection, PERRL and conjunctivae and sclerae normal  HENT: ear canals and TM's normal, nose and mouth without ulcers or lesions, oropharynx clear and oral mucous membranes moist  NECK: no adenopathy, no asymmetry, masses, or scars and thyroid normal to palpation  RESP: lungs clear to auscultation - no rales, rhonchi or wheezes  BREAST: normal without masses, tenderness or nipple discharge and no palpable axillary masses or adenopathy  CV: regular rate and rhythm, normal S1 S2, no S3 or S4, no murmur, click or rub, no peripheral edema and peripheral pulses strong  ABDOMEN: soft, nontender, no hepatosplenomegaly, no masses and bowel sounds normal   (female): normal female external genitalia, normal urethral meatus, vaginal mucosal atrophy noted, normal cervix, adnexae, and " "uterus without masses or abnormal discharge  MS: no musculoskeletal defects are noted and gait is age appropriate without ataxia  SKIN: no suspicious lesions or rashes  NEURO: Normal strength and tone, sensory exam grossly normal, mentation intact and speech normal  PSYCH: mentation appears normal and affect normal/bright    ASSESSMENT/PLAN:       ICD-10-CM    1. Encounter for routine adult medical exam with abnormal findings Z00.01    2. Essential hypertension with goal blood pressure less than 140/90-not well controlled -new diagnosis 11/8/2013- side fx with lisinopril & metoprolol -doesn't like to take meds I10 hydrochlorothiazide (HYDRODIURIL) 25 MG tablet     Basic metabolic panel     OFFICE/OUTPT VISIT,EST,LEVL IV   3. Hyperlipidemia with target LDL less than 130- uncertain control - awaiting labs E78.5 CRESTOR 5 MG tablet     OFFICE/OUTPT VISIT,EST,LEVL IV   4. Hypothyroidism, unspecified type- TSH = 4.02 12/11/2017 - a bit hypothyroid on recent labs - med adjustment  E03.9 SYNTHROID 112 MCG tablet     T4 free     TSH     T3 total     OFFICE/OUTPT VISIT,EST,LEVL IV   5. Need for prophylactic vaccination and inoculation against influenza Z23 HC FLU VAC PRESRV FREE QUAD SPLIT VIR 3+YRS IM  [36604]          ADMIN VACCINE, FIRST [77577]       COUNSELING:   Reviewed preventive health counseling, as reflected in patient instructions     reports that she has never smoked. She has never used smokeless tobacco.    Estimated body mass index is 31.78 kg/(m^2) as calculated from the following:    Height as of this encounter: 5' 3\" (1.6 m).    Weight as of this encounter: 179 lb 6.4 oz (81.4 kg).   Weight management plan: see next   Establish an exercise regimen. Activity goal: 45 minutes 5 days a week. New exercise routine: cardiovascular workout on exercise equipment, walking and weightlifting. Diet regimen was discussed and plan is self-directed dieting: reduce calories, reduce portions, reduce processed  carbs, " "increase fruits/vegetables and avoid sweets and supervised diet program. Avoid artificial sweeteners and \"diet\" drinks and sodas.          Spent  35minutes on pt care today outside of preventative care. All face to face time from 3:35pm  to 4:10pm.  Greater than 50% of time spent in coordination of care/counseling today re:  Essential hypertension with goal blood pressure less than 140/90-new diagnosis 11/8/2013- side fx with lisinopril & metoprolol -doesn't like to take meds - not well controlled today - had a long /mulugeta discussion with pt re: need for medications today for her long-term organ system health.    Hyperlipidemia with target LDL less than 130    Hypothyroidism, unspecified type- TSH = 4.02 12/11/2017        start HCTZ  12.5mg daily for bp. In addition to amlodipine 10mg daily.   Eat a banana or kiwi daily for potassium . Recheck your blood pressure in our pharmacy in 1 week or so if needed.  Have pharmacy send me their note.  Pt would like to wait on increasing synthroid to 125mcg -  Recheck tsh, free t4, total t3 in 4-6 weeks. Please  enter future orders for this and pt  can do this as a lab only appointment.       We discussed in detail , once again today, that despite pt's best efforts with losing weight, daily vigorous exercise, etc. She needs better bp control to lessen overall effects of htn on multiple organ systems, including CV, brain, kidneys, eyes, nerves, etc.  She has very strong fam hx of htn.       Counseling Resources:  ATP IV Guidelines  Pooled Cohorts Equation Calculator  Breast Cancer Risk Calculator  FRAX Risk Assessment  ICSI Preventive Guidelines  Dietary Guidelines for Americans, 2010  USDA's MyPlate  ASA Prophylaxis  Lung CA Screening    Yolanda Carter MD  [facility]  "

## 2017-12-14 NOTE — PATIENT INSTRUCTIONS
Eat a banana or kiwi daily for potassium . Recheck your blood pressure in our pharmacy in 1 week or so if needed.  Have pharmacy send me their note.  Pt would like to wait on increasing synthroid to 125mcg -  Recheck tsh, free t4, total t3 in 4-6 weeks. Please  enter future orders for this and pt  can do this as a lab only appointment.       Preventive Health Recommendations  Female Ages 50 - 64    Yearly exam: See your health care provider every year in order to  o Review health changes.   o Discuss preventive care.    o Review your medicines if your doctor has prescribed any.      Get a Pap test every three years (unless you have an abnormal result and your provider advises testing more often).    If you get Pap tests with HPV test, you only need to test every 5 years, unless you have an abnormal result.     You do not need a Pap test if your uterus was removed (hysterectomy) and you have not had cancer.    You should be tested each year for STDs (sexually transmitted diseases) if you're at risk.     Have a mammogram every 1 to 2 years.    Have a colonoscopy at age 50, or have a yearly FIT test (stool test). These exams screen for colon cancer.      Have a cholesterol test every 5 years, or more often if advised.    Have a diabetes test (fasting glucose) every three years. If you are at risk for diabetes, you should have this test more often.     If you are at risk for osteoporosis (brittle bone disease), think about having a bone density scan (DEXA).    Shots: Get a flu shot each year. Get a tetanus shot every 10 years.    Nutrition:     Eat at least 5 servings of fruits and vegetables each day.    Eat whole-grain bread, whole-wheat pasta and brown rice instead of white grains and rice.    Talk to your provider about Calcium and Vitamin D.     Lifestyle    Exercise at least 150 minutes a week (30 minutes a day, 5 days a week). This will help you control your weight and prevent disease.    Limit alcohol to one  drink per day.    No smoking.     Wear sunscreen to prevent skin cancer.     See your dentist every six months for an exam and cleaning.    See your eye doctor every 1 to 2 years.               Thank you for choosing [facility]  for your Health Care. It was a pleasure seeing you at your visit today. Please contact us with any questions or concerns you may have.                   Yolanda Carter MD                                  To reach your [facility] care team after hours call:   [phone]    Our clinic hours are:     Monday- 7:30 am - 7:00 pm                             Tuesday through Friday- 7:30 am - 5:00 pm                                        Saturday- 8:00 am - 12:00 pm                  Phone:  [phone]    Our pharmacy hours are:     Monday  8:00 am to 7:00 pm      Tuesday through Friday 8:00am to 6:00pm                        Saturday - 9:00 am to 1:00 pm      Sunday : Closed.              Phone:  [phone]      There is also information available at our web site:  [email or web address]    If your provider ordered any lab tests and you do not receive the results within 10 business days, please call the clinic.    If you need a medication refill please contact your pharmacy.  Please allow 2 business days for your refill to be completed.    Our clinic offers telephone visits and e visits.  Please ask one of your team members to explain more.      Use [employer] (secure email communication and access to your chart) to send your primary care provider a message or make an appointment. Ask someone on your Team how to sign up for ipsyt.

## 2017-12-15 ASSESSMENT — ANXIETY QUESTIONNAIRES: GAD7 TOTAL SCORE: 0

## 2018-01-18 DIAGNOSIS — I10 ESSENTIAL HYPERTENSION WITH GOAL BLOOD PRESSURE LESS THAN 140/90: ICD-10-CM

## 2018-01-18 DIAGNOSIS — E03.9 HYPOTHYROIDISM, UNSPECIFIED TYPE: ICD-10-CM

## 2018-01-18 LAB
ANION GAP SERPL CALCULATED.3IONS-SCNC: 5 MMOL/L (ref 3–14)
BUN SERPL-MCNC: 19 MG/DL (ref 7–30)
CALCIUM SERPL-MCNC: 10 MG/DL (ref 8.5–10.1)
CHLORIDE SERPL-SCNC: 104 MMOL/L (ref 94–109)
CO2 SERPL-SCNC: 31 MMOL/L (ref 20–32)
CREAT SERPL-MCNC: 0.78 MG/DL (ref 0.52–1.04)
GFR SERPL CREATININE-BSD FRML MDRD: 76 ML/MIN/1.7M2
GLUCOSE SERPL-MCNC: 82 MG/DL (ref 70–99)
POTASSIUM SERPL-SCNC: 4.4 MMOL/L (ref 3.4–5.3)
SODIUM SERPL-SCNC: 140 MMOL/L (ref 133–144)
T3 SERPL-MCNC: 114 NG/DL (ref 60–181)
T4 FREE SERPL-MCNC: 1.21 NG/DL (ref 0.76–1.46)
TSH SERPL DL<=0.005 MIU/L-ACNC: 0.67 MU/L (ref 0.4–4)

## 2018-01-18 PROCEDURE — 36415 COLL VENOUS BLD VENIPUNCTURE: CPT | Performed by: FAMILY MEDICINE

## 2018-01-18 PROCEDURE — 84439 ASSAY OF FREE THYROXINE: CPT | Performed by: FAMILY MEDICINE

## 2018-01-18 PROCEDURE — 84443 ASSAY THYROID STIM HORMONE: CPT | Performed by: FAMILY MEDICINE

## 2018-01-18 PROCEDURE — 84480 ASSAY TRIIODOTHYRONINE (T3): CPT | Performed by: FAMILY MEDICINE

## 2018-01-18 PROCEDURE — 80048 BASIC METABOLIC PNL TOTAL CA: CPT | Performed by: FAMILY MEDICINE

## 2018-01-19 ENCOUNTER — MYC MEDICAL ADVICE (OUTPATIENT)
Dept: FAMILY MEDICINE | Facility: CLINIC | Age: 58
End: 2018-01-19

## 2018-01-24 NOTE — TELEPHONE ENCOUNTER
Cindy Lugo contacted June on 01/24/18 and left a message. If patient calls back please contact RN team.  Kendra Lugo RN  [facility] [place]

## 2018-01-24 NOTE — TELEPHONE ENCOUNTER
Pt does not want to keep going on prescriptions as any answer for their symptoms/issues/results.     Pt aware of the need for the levothyroxine. Pt wants to know per JV if any of the BP medications can be discontinued at all?    Pt wants JV to ask the cardiologist and note the systolic is where they want them to be, and is there anything that would be advised or changed?    Pt declines the endocrinology referral at this time. Pt uses [facility] pharmacy if needed.    OK to mychart any further recommendations.    Routing to PCP for further review/recommendations/orders.  Kendra Lugo RN  [facility] [facility]

## 2018-01-24 NOTE — TELEPHONE ENCOUNTER
TSH   Date Value Ref Range Status   01/18/2018 0.67 0.40 - 4.00 mU/L Final   12/11/2017 4.02 (H) 0.40 - 4.00 mU/L Final   11/16/2016 1.28 0.40 - 4.00 mU/L Final   12/31/2015 1.45 0.40 - 4.00 mU/L Final   10/08/2015 6.39 (H) 0.40 - 4.00 mU/L Final     Please call pt  - thyroid /tsh levels can vary with protein intake and sometimes other hormones as well.  Looks like bp is well controlled with her current medicines 12.5mg of HCTZ and 10mg of amlodipine daily   ? Does the HCTZ come in a 12.5mg tablet or is there a combination pill with the same doses of amlodipine and HCTZ that I'm not aware of? please have pt check with pharmacy on this. Please offer pt endocrinology referral if she desires, re: her thyroid issues.

## 2018-01-25 ENCOUNTER — MYC MEDICAL ADVICE (OUTPATIENT)
Dept: FAMILY MEDICINE | Facility: CLINIC | Age: 58
End: 2018-01-25

## 2018-01-25 NOTE — TELEPHONE ENCOUNTER
Sent mychart with details below per pt request.    Also routing note to [facility]. Please contact pt with any medication changes or recommendations.     Kendra Lugo RN  [place] [facility]

## 2018-01-25 NOTE — TELEPHONE ENCOUNTER
BP Readings from Last 6 Encounters:   12/14/17 138/82   10/02/17 142/90   08/23/17 158/80   07/26/17 (!) 174/100   12/07/16 144/84   11/24/15 164/84     Since pt's bp is just under 140/90 with her current medications , I would recommend staying with exactly what she has now.  Taking her off one of these will probably increase her blood pressure , which is not what we want.   Please place a call or forward this message to [facility] - pt saw Dr. CHINYERE Clark 7/2017 and have her review pt's recent bp's and meds to see if she'd make any changes and have her office call pt either way = with changes or not.      Look like pt has plenty of medications for now until the spring. I know pt doesn't like taking prescription medications, but it seems like she is on what she needs at this time - no more, no less.

## 2018-01-30 NOTE — TELEPHONE ENCOUNTER
was updated on pt's [employer] message last week. She said pt should have AKHIL ov or OV with her to discuss bps further. Pt due for 3 month f/u. Order already in EPIC. I left message for pt with this information. Cornelio JEAN

## 2018-02-12 ENCOUNTER — TELEPHONE (OUTPATIENT)
Dept: FAMILY MEDICINE | Facility: CLINIC | Age: 58
End: 2018-02-12

## 2018-02-12 NOTE — TELEPHONE ENCOUNTER
Prior Authorization Retail Medication Request  Medication/Dose: prior authorization needs updating for crestor  Diagnosis and ICD code: E78.5  New/Renewal/Insurance Change PA: Renewal  Previously Tried and Failed Therapies: see PA encounter dated 1/24/17 and also approval letter from 1/25/17    Insurance ID (if provided): [id number]  Insurance Phone (if provided): [phone]    -Deborah Ram,Certified Pharmacy Technician, [facility], Ph. [phone]

## 2018-02-13 ENCOUNTER — TELEPHONE (OUTPATIENT)
Dept: FAMILY MEDICINE | Facility: CLINIC | Age: 58
End: 2018-02-13

## 2018-02-13 NOTE — TELEPHONE ENCOUNTER
Prior Authorization Retail Medication Request  Medication/Dose:  prior authorization needs updating for synthroid  Diagnosis and ICD code: E03.9  New/Renewal/Insurance Change PA: Renewal  Previously Tried and Failed Therapies: see PA encounter dated 1/18/17     Insurance ID (if provided): [id number]  Insurance Phone (if provided): [phone]

## 2018-02-14 NOTE — TELEPHONE ENCOUNTER
Prior Authorization Approval    Authorization Effective Date:    Authorization Expiration Date:    Medication: Synthroid 112   Approved Dose/Quantity:    Reference #:     Insurance Company: [employer] - Phone [phone] Fax [phone]  Expected CoPay: 0.00     CoPay Card Available:      Foundation Assistance Needed:    Which Pharmacy is filling the prescription (Not needed for infusion/clinic administered): [facility] - [place], [place] - [address] [place] [address]  Pharmacy Notified: Yes  Patient Notified: Yes

## 2018-02-14 NOTE — TELEPHONE ENCOUNTER
Central Prior Authorization Team   Phone: [phone]    PA Initiation    Medication: Synthroid 112   Insurance Company: [employer] - Phone [phone] Fax [phone]  Pharmacy Filling the Rx: [facility] [address] [place] [address]  Filling Pharmacy Phone: [phone]  Filling Pharmacy Fax:    Start Date: 2/13/2018

## 2018-02-14 NOTE — TELEPHONE ENCOUNTER
Prior Authorization Approval    Authorization Effective Date: 2/13/2018  Authorization Expiration Date: 2/14/2019  Medication: prior authorization needs updating for synthroid and crestor  Approved Dose/Quantity:    Reference #:     Insurance Company: [employer] - Phone [phone] Fax [phone]  Expected CoPay: 0.00     CoPay Card Available:      Foundation Assistance Needed:    Which Pharmacy is filling the prescription (Not needed for infusion/clinic administered): [facility] [place] [place] [address] [place] [address]  Pharmacy Notified: Yes  Patient Notified: Yes

## 2018-02-14 NOTE — TELEPHONE ENCOUNTER
Central Prior Authorization Team   Phone: [phone]    PA Initiation    Medication: prior authorization needs updating for synthroid and crestor  Insurance Company: [employer] - Phone [phone] Fax [phone]  Pharmacy Filling the Rx: [facility] [place] [facility] [place] [address] [place] [address]  Filling Pharmacy Phone: [phone]  Filling Pharmacy Fax:    Start Date: 2/14/2018

## 2018-03-26 ENCOUNTER — MYC MEDICAL ADVICE (OUTPATIENT)
Dept: CARDIOLOGY | Facility: CLINIC | Age: 58
End: 2018-03-26

## 2018-03-26 ENCOUNTER — MYC MEDICAL ADVICE (OUTPATIENT)
Dept: FAMILY MEDICINE | Facility: CLINIC | Age: 58
End: 2018-03-26

## 2018-03-26 DIAGNOSIS — I10 ESSENTIAL HYPERTENSION WITH GOAL BLOOD PRESSURE LESS THAN 140/90: Primary | ICD-10-CM

## 2018-03-27 NOTE — TELEPHONE ENCOUNTER
Please see my chart message below     Please review and advise     Thank you     Socorro Stallings RN, BSN  [facility]

## 2018-03-28 ENCOUNTER — MYC MEDICAL ADVICE (OUTPATIENT)
Dept: FAMILY MEDICINE | Facility: CLINIC | Age: 58
End: 2018-03-28

## 2018-03-28 NOTE — TELEPHONE ENCOUNTER
TSH   Date Value Ref Range Status   01/18/2018 0.67 0.40 - 4.00 mU/L Final   12/11/2017 4.02 (H) 0.40 - 4.00 mU/L Final   11/16/2016 1.28 0.40 - 4.00 mU/L Final   12/31/2015 1.45 0.40 - 4.00 mU/L Final   10/08/2015 6.39 (H) 0.40 - 4.00 mU/L Final     tsh = 0.67 is considered within normal range now.     BP Readings from Last 6 Encounters:   12/14/17 138/82   10/02/17 142/90   08/23/17 158/80   07/26/17 (!) 174/100   12/07/16 144/84   11/24/15 164/84     What is pt's bp running now ?  Perhaps stop the HCTZ and see if this helps the hearing issues.  If so, then let us know.  I believe pt also saw Cardiology last year re: her bp - perhaps have her contact them as well, if bp not well controlled off the HCTZ.        If pt desires, can see [facility] - Dr. Chay Machado or Dr. Chaz Ace for her hearing issues.  Ok to place that referral.       Recheck  blood pressure in our pharmacy in 1 week or sooner if needed.  Have pharmacy send me their note.

## 2018-04-06 ENCOUNTER — MYC MEDICAL ADVICE (OUTPATIENT)
Dept: FAMILY MEDICINE | Facility: CLINIC | Age: 58
End: 2018-04-06

## 2018-04-06 NOTE — TELEPHONE ENCOUNTER
See mychart message from yesterday.    Sasha Prather, BS, RN, N  [facility]) [phone]

## 2018-07-10 DIAGNOSIS — I10 ESSENTIAL HYPERTENSION WITH GOAL BLOOD PRESSURE LESS THAN 140/90: ICD-10-CM

## 2018-07-10 NOTE — TELEPHONE ENCOUNTER
"Requested Prescriptions   Pending Prescriptions Disp Refills     hydrochlorothiazide (HYDRODIURIL) 25 MG tablet [Pharmacy Med Name: HYDROCHLOROTHIAZIDE 25MG TABS] 15 tablet 6      Last Written Prescription Date:  12/14/2017  Last Fill Quantity: 15,  # refills: 6   Last office visit: 12/14/2017  Sig: TAKE ONE-HALF TABLET BY MOUTH EVERY DAY    Diuretics (Including Combos) Protocol Passed    7/10/2018  1:30 PM       Passed - Blood pressure under 140/90 in past 12 months    BP Readings from Last 3 Encounters:   12/14/17 138/82   10/02/17 142/90   08/23/17 158/80                Passed - Recent (12 mo) or future (30 days) visit within the authorizing provider's specialty    Patient had office visit in the last 12 months or has a visit in the next 30 days with authorizing provider or within the authorizing provider's specialty.  See \"Patient Info\" tab in inbasket, or \"Choose Columns\" in Meds & Orders section of the refill encounter.           Passed - Patient is age 18 or older       Passed - No active pregancy on record       Passed - Normal serum creatinine on file in past 12 months    Recent Labs   Lab Test  01/18/18   1031   CR  0.78             Passed - Normal serum potassium on file in past 12 months    Recent Labs   Lab Test  01/18/18   1031   POTASSIUM  4.4                   Passed - Normal serum sodium on file in past 12 months    Recent Labs   Lab Test  01/18/18   1031   NA  140             Passed - No positive pregnancy test in past 12 months          "

## 2018-07-11 RX ORDER — HYDROCHLOROTHIAZIDE 25 MG/1
TABLET ORAL
Qty: 15 TABLET | Refills: 4 | Status: SHIPPED | OUTPATIENT
Start: 2018-07-11 | End: 2018-10-02

## 2018-07-11 NOTE — TELEPHONE ENCOUNTER
Prescription approved per [facility].  Kendra Lugo RN  [facility]

## 2018-09-11 DIAGNOSIS — I10 HYPERTENSION GOAL BP (BLOOD PRESSURE) < 140/90: ICD-10-CM

## 2018-09-11 DIAGNOSIS — E03.9 HYPOTHYROIDISM, UNSPECIFIED TYPE: ICD-10-CM

## 2018-09-11 NOTE — TELEPHONE ENCOUNTER
"Requested Prescriptions   Pending Prescriptions Disp Refills     SYNTHROID 100 MCG tablet [Pharmacy Med Name: SYNTHROID 100MCG TABS] 90 tablet 1        Last Written Prescription Date:  4.6.18  Last Fill Quantity: 90,  # refills: 1   Last Office Visit: 12/14/2017   Future Office Visit:      Sig: TAKE ONE TABLET BY MOUTH DAILY    Thyroid Protocol Passed    9/11/2018  3:16 PM       Passed - Patient is 12 years or older       Passed - Recent (12 mo) or future (30 days) visit within the authorizing provider's specialty    Patient had office visit in the last 12 months or has a visit in the next 30 days with authorizing provider or within the authorizing provider's specialty.  See \"Patient Info\" tab in inbasket, or \"Choose Columns\" in Meds & Orders section of the refill encounter.           Passed - Normal TSH on file in past 12 months    Recent Labs   Lab Test  01/18/18   1031   TSH  0.67             Passed - No active pregnancy on record    If patient is pregnant or has had a positive pregnancy test, please check TSH.         Passed - No positive pregnancy test in past 12 months    If patient is pregnant or has had a positive pregnancy test, please check TSH.            "

## 2018-09-12 RX ORDER — LEVOTHYROXINE SODIUM 100 MCG
TABLET ORAL
Qty: 90 TABLET | Refills: 0 | Status: SHIPPED | OUTPATIENT
Start: 2018-09-12 | End: 2018-10-02

## 2018-09-12 RX ORDER — AMLODIPINE BESYLATE 10 MG/1
10 TABLET ORAL DAILY
Qty: 90 TABLET | Refills: 0 | Status: SHIPPED | OUTPATIENT
Start: 2018-09-12 | End: 2018-10-02

## 2018-09-12 NOTE — TELEPHONE ENCOUNTER
"Requested Prescriptions   Pending Prescriptions Disp Refills     amLODIPine (NORVASC) 10 MG tablet 90 tablet 3      Last Written Prescription Date:  10.2.17  Last Fill Quantity: 90,  # refills: 3   Last Office Visit: 12/14/2017   Future Office Visit:      Sig: Take 1 tablet (10 mg) by mouth daily    Calcium Channel Blockers Protocol  Passed    9/11/2018  5:11 PM       Passed - Blood pressure under 140/90 in past 12 months    BP Readings from Last 3 Encounters:   12/14/17 138/82   10/02/17 142/90   08/23/17 158/80                Passed - Recent (12 mo) or future (30 days) visit within the authorizing provider's specialty    Patient had office visit in the last 12 months or has a visit in the next 30 days with authorizing provider or within the authorizing provider's specialty.  See \"Patient Info\" tab in inbasket, or \"Choose Columns\" in Meds & Orders section of the refill encounter.           Passed - Patient is age 18 or older       Passed - No active pregnancy on record       Passed - Normal serum creatinine on file in past 12 months    Recent Labs   Lab Test  01/18/18   1031   CR  0.78            Passed - No positive pregnancy test in past 12 months          "

## 2018-09-12 NOTE — TELEPHONE ENCOUNTER
Prescription approved per [facility].  Kendra Lugo RN  [facility]

## 2018-10-02 ENCOUNTER — TELEPHONE (OUTPATIENT)
Dept: FAMILY MEDICINE | Facility: CLINIC | Age: 58
End: 2018-10-02

## 2018-10-02 DIAGNOSIS — I10 ESSENTIAL HYPERTENSION WITH GOAL BLOOD PRESSURE LESS THAN 140/90: ICD-10-CM

## 2018-10-02 DIAGNOSIS — E03.9 HYPOTHYROIDISM, UNSPECIFIED TYPE: ICD-10-CM

## 2018-10-02 DIAGNOSIS — I10 HYPERTENSION GOAL BP (BLOOD PRESSURE) < 140/90: ICD-10-CM

## 2018-10-02 DIAGNOSIS — E78.5 HYPERLIPIDEMIA WITH TARGET LDL LESS THAN 130: ICD-10-CM

## 2018-10-02 RX ORDER — ROSUVASTATIN CALCIUM 5 MG
5 TABLET ORAL DAILY
Qty: 90 TABLET | Refills: 0 | Status: SHIPPED | OUTPATIENT
Start: 2018-10-02 | End: 2018-12-17

## 2018-10-02 RX ORDER — HYDROCHLOROTHIAZIDE 25 MG/1
12.5 TABLET ORAL DAILY
Qty: 45 TABLET | Refills: 0 | Status: SHIPPED | OUTPATIENT
Start: 2018-10-02 | End: 2018-12-17

## 2018-10-02 RX ORDER — AMLODIPINE BESYLATE 10 MG/1
10 TABLET ORAL DAILY
Qty: 90 TABLET | Refills: 0 | Status: SHIPPED | OUTPATIENT
Start: 2018-10-02 | End: 2018-12-17

## 2018-10-02 RX ORDER — LEVOTHYROXINE SODIUM 100 MCG
100 TABLET ORAL DAILY
Qty: 90 TABLET | Refills: 0 | Status: SHIPPED | OUTPATIENT
Start: 2018-10-02 | End: 2018-12-17

## 2018-10-02 NOTE — TELEPHONE ENCOUNTER
Reason for Call:  Other call back    Detailed comments: Pt called in to talk to a nurse about her medications, she would not specify which ones or what the questions were. Please advise    Phone Number Patient can be reached at: Home number on file [phone] (home)    Best Time:     Can we leave a detailed message on this number? YES    Call taken on 10/2/2018 at 4:05 PM by Kendra Rodríguez

## 2018-10-02 NOTE — TELEPHONE ENCOUNTER
Called # below     Pt stated she will need to be generic drugs per her insurance as of 10/1/2018    Due to the cost of the Crestor she cannot afford it - RN advised about [facility]     BP Readings from Last 3 Encounters:   12/14/17 138/82   10/02/17 142/90   08/23/17 158/80     Pt wondering if she should be on both BP medications reviewed why she would need to be on both     Patient stated an understanding and agreed with plan.  Asked to have her scripts sent to [facility] pharmacy     Socorro Stallings RN, BSN  [facility]

## 2018-10-03 ENCOUNTER — MYC MEDICAL ADVICE (OUTPATIENT)
Dept: FAMILY MEDICINE | Facility: CLINIC | Age: 58
End: 2018-10-03

## 2018-10-03 DIAGNOSIS — Z12.31 VISIT FOR SCREENING MAMMOGRAM: Primary | ICD-10-CM

## 2018-10-03 NOTE — TELEPHONE ENCOUNTER
Please see my chart message below     Please review and advise     Thank you     Socorro Stallings RN, BSN  [facility]

## 2018-12-04 ENCOUNTER — TELEPHONE (OUTPATIENT)
Dept: OTHER | Facility: CLINIC | Age: 58
End: 2018-12-04

## 2018-12-05 ENCOUNTER — RADIANT APPOINTMENT (OUTPATIENT)
Dept: MAMMOGRAPHY | Facility: CLINIC | Age: 58
End: 2018-12-05
Attending: FAMILY MEDICINE
Payer: COMMERCIAL

## 2018-12-05 DIAGNOSIS — Z12.31 VISIT FOR SCREENING MAMMOGRAM: ICD-10-CM

## 2018-12-05 PROCEDURE — 77067 SCR MAMMO BI INCL CAD: CPT | Mod: TC

## 2018-12-17 ENCOUNTER — OFFICE VISIT (OUTPATIENT)
Dept: FAMILY MEDICINE | Facility: CLINIC | Age: 58
End: 2018-12-17
Payer: COMMERCIAL

## 2018-12-17 VITALS
HEIGHT: 63 IN | BODY MASS INDEX: 32.43 KG/M2 | DIASTOLIC BLOOD PRESSURE: 76 MMHG | HEART RATE: 112 BPM | WEIGHT: 183 LBS | SYSTOLIC BLOOD PRESSURE: 122 MMHG | TEMPERATURE: 97.9 F | OXYGEN SATURATION: 97 %

## 2018-12-17 DIAGNOSIS — M67.449 DIGITAL MUCINOUS CYST OF FINGER: ICD-10-CM

## 2018-12-17 DIAGNOSIS — I10 ESSENTIAL HYPERTENSION WITH GOAL BLOOD PRESSURE LESS THAN 140/90: ICD-10-CM

## 2018-12-17 DIAGNOSIS — Z00.00 ROUTINE HISTORY AND PHYSICAL EXAMINATION OF ADULT: Primary | ICD-10-CM

## 2018-12-17 DIAGNOSIS — E03.9 HYPOTHYROIDISM, UNSPECIFIED TYPE: ICD-10-CM

## 2018-12-17 DIAGNOSIS — E78.5 HYPERLIPIDEMIA WITH TARGET LDL LESS THAN 130: ICD-10-CM

## 2018-12-17 DIAGNOSIS — Z11.4 ENCOUNTER FOR SCREENING FOR HIV: ICD-10-CM

## 2018-12-17 DIAGNOSIS — E66.09 NON MORBID OBESITY DUE TO EXCESS CALORIES: ICD-10-CM

## 2018-12-17 LAB
CREAT UR-MCNC: 98 MG/DL
MICROALBUMIN UR-MCNC: 8 MG/L
MICROALBUMIN/CREAT UR: 8.15 MG/G CR (ref 0–25)

## 2018-12-17 PROCEDURE — 36415 COLL VENOUS BLD VENIPUNCTURE: CPT | Performed by: PHYSICIAN ASSISTANT

## 2018-12-17 PROCEDURE — 87389 HIV-1 AG W/HIV-1&-2 AB AG IA: CPT | Performed by: PHYSICIAN ASSISTANT

## 2018-12-17 PROCEDURE — 80053 COMPREHEN METABOLIC PANEL: CPT | Performed by: PHYSICIAN ASSISTANT

## 2018-12-17 PROCEDURE — 82043 UR ALBUMIN QUANTITATIVE: CPT | Performed by: PHYSICIAN ASSISTANT

## 2018-12-17 PROCEDURE — 99213 OFFICE O/P EST LOW 20 MIN: CPT | Mod: 25 | Performed by: PHYSICIAN ASSISTANT

## 2018-12-17 PROCEDURE — 80061 LIPID PANEL: CPT | Performed by: PHYSICIAN ASSISTANT

## 2018-12-17 PROCEDURE — 99396 PREV VISIT EST AGE 40-64: CPT | Performed by: PHYSICIAN ASSISTANT

## 2018-12-17 PROCEDURE — 84443 ASSAY THYROID STIM HORMONE: CPT | Performed by: PHYSICIAN ASSISTANT

## 2018-12-17 RX ORDER — LEVOTHYROXINE SODIUM 100 MCG
100 TABLET ORAL DAILY
Qty: 90 TABLET | Refills: 3 | Status: SHIPPED | OUTPATIENT
Start: 2018-12-17 | End: 2019-01-29

## 2018-12-17 RX ORDER — AMLODIPINE BESYLATE 10 MG/1
10 TABLET ORAL DAILY
Qty: 90 TABLET | Refills: 3 | Status: SHIPPED | OUTPATIENT
Start: 2018-12-17 | End: 2019-12-27

## 2018-12-17 RX ORDER — HYDROCHLOROTHIAZIDE 25 MG/1
12.5 TABLET ORAL DAILY
Qty: 45 TABLET | Refills: 3 | Status: SHIPPED | OUTPATIENT
Start: 2018-12-17 | End: 2019-12-03

## 2018-12-17 RX ORDER — ROSUVASTATIN CALCIUM 5 MG/1
5 TABLET, COATED ORAL DAILY
Qty: 90 TABLET | Refills: 3 | Status: SHIPPED | OUTPATIENT
Start: 2018-12-17 | End: 2019-12-03

## 2018-12-17 ASSESSMENT — MIFFLIN-ST. JEOR: SCORE: 1379.21

## 2018-12-17 NOTE — PROGRESS NOTES
"   SUBJECTIVE:   CC: June Nunn is an 58 year old woman who presents for preventive health visit.     Answers for HPI/ROS submitted by the patient on 2018   Annual Exam:  Frequency of exercise:: 6-7 days/week  Getting at least 3 servings of Calcium per day:: Yes  Diet:: Regular (no restrictions)  Taking medications regularly:: Yes  Medication side effects:: Muscle aches, Lightheadedness - each only occur episodically so does not feel they are from her medications, but overall just desires to limit meds if she doesn't need them.   Bi-annual eye exam:: Yes  Dental care twice a year:: Yes  Sleep apnea or symptoms of sleep apnea:: None  Additional concerns today:: Yes  Duration of exercise:: 45-60 minutes    Previously followed by , but just moved to [place] so came here today.    Would really like to avoid being on prescription drugs.  Exercises a lot and noticed that her heart rate was varying more. Saw cardiology and reports \"Everything was normal\", but decreased her levothyroxine and started her on hydrochlorothiazide. Previously had been on amlodipine and never really felt like it helped her BP that much. Got cardiology's opinion on this which was why she had hydrochlorothiazide added instead since BP was still not at goal. Since adding hydrochlorothiazide feels that BP has been much better and doesn't feel she needs amlodipine anymore. Desires to limit amount of meds she takes if at all possible.    Leaving to go out town for [place] and doesn't want to change things now, but would really like to try cutting amlodipine back when she returns. Will check back in January to do this.    Had a change in insurance and had previously been on brand \"crestor\" and has always seemed to tolerate it better than generic. Now it's more expensive so wonders if she should go back on the generic. Ok to put script for generic and she will let me know if she runs into any issues.    Dad  of prostate " cancer and had some heart issues, but otherwise was in good shape.  Mom had stroke at 82.   Worries about how genetics contributes to her health.     Today's PHQ-2 Score:   PHQ-2 ( 1999 Pf[employer]) 12/14/2018 12/14/2017   Q1: Little interest or pleasure in doing things 0 0   Q2: Feeling down, depressed or hopeless 0 0   PHQ-2 Score 0 0   Q1: Little interest or pleasure in doing things Not at all -   Q2: Feeling down, depressed or hopeless Not at all -   PHQ-2 Score 0 -       Abuse: Current or Past(Physical, Sexual or Emotional)- No  Do you feel safe in your environment? Yes    Social History     Tobacco Use     Smoking status: Never Smoker     Smokeless tobacco: Never Used   Substance Use Topics     Alcohol use: Yes     Alcohol/week: 1.0 oz     Comment: occasional beer      If you drink alcohol do you typically have >3 drinks per day or >7 drinks per week? No                     Reviewed orders with patient.  Reviewed health maintenance and updated orders accordingly - Yes  BP Readings from Last 3 Encounters:   12/17/18 122/76   12/14/17 138/82   10/02/17 142/90    Wt Readings from Last 3 Encounters:   12/17/18 83 kg (183 lb)   12/14/17 81.4 kg (179 lb 6.4 oz)   10/02/17 80.7 kg (178 lb)                  Patient Active Problem List   Diagnosis     Hypothyroidism, unspecified type     Abdominal pain, epigastric     Simple goiter     Health Care Home     Living will, counseling/discussion     Heart murmur     Symptomatic menopausal or female climacteric states     Hyperlipidemia with target LDL less than 130     Essential hypertension with goal blood pressure less than 140/90     Elevated liver enzymes- very mild 11/8/2013     Bilateral sensorineural hearing loss - left > right     Medication adverse effect     Non morbid obesity due to excess calories     Increased BMI     Past Surgical History:   Procedure Laterality Date     HC TOOTH EXTRACTION W/FORCEP      wisdom       Social History     Tobacco Use     Smoking  status: Never Smoker     Smokeless tobacco: Never Used   Substance Use Topics     Alcohol use: Yes     Alcohol/week: 1.0 oz     Comment: occasional beer      Family History   Problem Relation Age of Onset     C.A.D. Father         MI at 71,  of prostate Cancer     Cancer Father         prostate cancer     Lipids Mother      Thyroid Disease Mother      Diabetes Mother         type 2 diabetes      Hypertension Mother      Hyperlipidemia Mother      Cerebrovascular Disease Mother 82        stroke from A fib      Hyperlipidemia Brother      Hypertension Brother      Breast Cancer Sister      Thyroid Disease Sister      Breast Cancer Sister      Thyroid Disease Sister      Thyroid Disease Sister      Thyroid Disease Sister      Cancer - colorectal No family hx of          Current Outpatient Medications   Medication Sig Dispense Refill     amLODIPine (NORVASC) 10 MG tablet Take 1 tablet (10 mg) by mouth daily 90 tablet 0     CRESTOR 5 MG tablet Take 1 tablet (5 mg) by mouth daily 90 tablet 0     hydrochlorothiazide (HYDRODIURIL) 25 MG tablet Take 0.5 tablets (12.5 mg) by mouth daily 45 tablet 0     SYNTHROID 100 MCG tablet Take 1 tablet (100 mcg) by mouth daily 90 tablet 0     Allergies   Allergen Reactions     Lisinopril Other (See Comments)     lisinopril 5mg= severe vertigo     Metoprolol      Ringing in ears & extremities feeling cold       Mammogram Screening: Patient over age 50, mutual decision to screen reflected in health maintenance.    Pertinent mammograms are reviewed under the imaging tab.  History of abnormal Pap smear: NO - age 30- 65 PAP every 3 years recommended  PAP / HPV Latest Ref Rng & Units 2016 2013 10/25/2011   PAP - NIL NIL -   HPV 16 DNA NEG Negative - -   HPV 18 DNA NEG Negative - -   OTHER HR HPV NEG Negative - -   HPV DNA - - - NOT DETECTED     Reviewed and updated as needed this visit by clinical staff  Allergies  Meds         Reviewed and updated as needed this visit by  "Provider  Allergies  Meds            ROS:  CONSTITUTIONAL: NEGATIVE for fever, chills, change in weight  INTEGUMENTARY/SKIN: NEGATIVE for worrisome rashes, moles or lesions  EYES: NEGATIVE for vision changes or irritation  ENT: NEGATIVE for ear, mouth and throat problems  RESP: NEGATIVE for significant cough or SOB  BREAST: NEGATIVE for masses, tenderness or discharge  CV: NEGATIVE for chest pain, palpitations or peripheral edema  GI: NEGATIVE for nausea, abdominal pain, heartburn, or change in bowel habits  : NEGATIVE for unusual urinary or vaginal symptoms. No vaginal bleeding.  MUSCULOSKELETAL: + for cyst over joint of her R 4th finger. Doesn't bother her unless she bumps it on something. Would like to consider removal though. NEGATIVE for significant arthralgias or myalgia  NEURO: NEGATIVE for weakness, dizziness or paresthesias  PSYCHIATRIC: NEGATIVE for changes in mood or affect     OBJECTIVE:   /76 (BP Location: Right arm, Cuff Size: Adult Regular)   Pulse 112   Temp 97.9  F (36.6  C) (Oral)   Ht 1.6 m (5' 3\")   Wt 83 kg (183 lb)   LMP 05/15/2015   SpO2 97%   BMI 32.42 kg/m    EXAM:  GENERAL: healthy, alert and no distress  EYES: Eyes grossly normal to inspection, PERRL and conjunctivae and sclerae normal  HENT: ear canals and TM's normal, nose and mouth without ulcers or lesions  NECK: no adenopathy, no asymmetry, masses, or scars and thyroid normal to palpation  RESP: lungs clear to auscultation - no rales, rhonchi or wheezes  BREAST: defered  CV: regular rate and rhythm, normal S1 S2, no S3 or S4, no murmur, click or rub, no peripheral edema and peripheral pulses strong  ABDOMEN: soft, nontender, no hepatosplenomegaly, no masses and bowel sounds normal  MS: 4mm cyst-like prominence noted over dorsal aspect of her R 4th finger adjacent to PIPJ. no gross musculoskeletal defects noted, no edema  SKIN: no suspicious lesions or rashes  NEURO: Normal strength and tone, mentation intact and " "speech normal  PSYCH: mentation appears normal, affect normal/bright    Diagnostic Test Results:  none     ASSESSMENT/PLAN:       ICD-10-CM    1. Routine history and physical examination of adult Z00.00    2. Hyperlipidemia with target LDL less than 130- uncertain control - awaiting labs E78.5 rosuvastatin (CRESTOR) 5 MG tablet     Lipid panel reflex to direct LDL Fasting   3. Essential hypertension with goal blood pressure less than 140/90- well controlled -new diagnosis 11/8/2013- side fx with lisinopril & metoprolol  I10 hydrochlorothiazide (HYDRODIURIL) 25 MG tablet     Comprehensive metabolic panel     Albumin Random Urine Quantitative with Creat Ratio   4. Hypothyroidism, unspecified type E03.9 amLODIPine (NORVASC) 10 MG tablet     SYNTHROID 100 MCG tablet     TSH with free T4 reflex   5. Encounter for screening for HIV Z11.4 HIV Antigen Antibody Combo   6. Digital mucinous cyst of finger M67.449 ORTHO  REFERRAL   7. Non morbid obesity due to excess calories E66.09    New Pt transfer care from Dr. Carter as now living in [place] and wishes to come to this clinic.  Reports she would like to minimize the amount of medications she takes and \"start from scratch.\"  Thus, we discussed doing trials off of her medications specifically discontinuing crestor with repeat labs in 3-6 months and she will also try cutting back dose of amlodipine as had hydrochlorothiazide started by cardiology and feels that BP has been better managed with this.  She will track BPs for me and follow-up to review log if noticing any persistent elevations.   See pt instructions.   Will also refer to ortho for probable digital mucinous cyst she would like removed.  Previous TSH is stable - check labs and notify of results when available. Is aware that does adjustment could be considered at that time if needed.    COUNSELING:   Reviewed preventive health counseling, as reflected in patient instructions       Regular exercise       " "Healthy diet/nutrition       Immunizations    Declined: Influenza due to Concerns about side effects/safety      She will check with insurance regarding coverage for shingles vaccine and return for completion if covered here.             HIV screeninx in teen years, 1x in adult years, and at intervals if high risk       (Keri)menopause management    BP Readings from Last 1 Encounters:   18 122/76     Estimated body mass index is 32.42 kg/m  as calculated from the following:    Height as of this encounter: 1.6 m (5' 3\").    Weight as of this encounter: 83 kg (183 lb).      Weight management plan: Discussed healthy diet and exercise guidelines     reports that  has never smoked. she has never used smokeless tobacco.      Counseling Resources:  ATP IV Guidelines  Pooled Cohorts Equation Calculator  Breast Cancer Risk Calculator  FRAX Risk Assessment  ICSI Preventive Guidelines  Dietary Guidelines for Americans, 2010  USDA's MyPlate  ASA Prophylaxis  Lung CA Screening    Neva Acevedo PA-C  [facility]      "

## 2018-12-17 NOTE — PATIENT INSTRUCTIONS
Ok to trial cutting norvasc down to 5mg and see if BP rises.  If goes up, it is likely helping and I would continue it.  If stays normal could stop totally and continue to monitor BP. Again, if BP goes up then I would stay on it. Re-check with me for BP log review so I know what's going on.   Can also consider trial off of your statin, but would repeat baseline lipids to see in 3-6 months after stopping.       Preventive Health Recommendations  Female Ages 50 - 64    Yearly exam: See your health care provider every year in order to  o Review health changes.   o Discuss preventive care.    o Review your medicines if your doctor has prescribed any.      Get a Pap test every three years (unless you have an abnormal result and your provider advises testing more often).    If you get Pap tests with HPV test, you only need to test every 5 years, unless you have an abnormal result.     You do not need a Pap test if your uterus was removed (hysterectomy) and you have not had cancer.    You should be tested each year for STDs (sexually transmitted diseases) if you're at risk.     Have a mammogram every 1 to 2 years.    Have a colonoscopy at age 50, or have a yearly FIT test (stool test). These exams screen for colon cancer.      Have a cholesterol test every 5 years, or more often if advised.    Have a diabetes test (fasting glucose) every three years. If you are at risk for diabetes, you should have this test more often.     If you are at risk for osteoporosis (brittle bone disease), think about having a bone density scan (DEXA).    Shots: Get a flu shot each year. Get a tetanus shot every 10 years.    Nutrition:     Eat at least 5 servings of fruits and vegetables each day.    Eat whole-grain bread, whole-wheat pasta and brown rice instead of white grains and rice.    Get adequate Calcium and Vitamin D.     Lifestyle    Exercise at least 150 minutes a week (30 minutes a day, 5 days a week). This will help you control your  weight and prevent disease.    Limit alcohol to one drink per day.    No smoking.     Wear sunscreen to prevent skin cancer.     See your dentist every six months for an exam and cleaning.    See your eye doctor every 1 to 2 years.

## 2018-12-17 NOTE — LETTER
December 24, 2018      June Nunn  [address] [place] [address]        Dear ,    We are writing to inform you of your test results.    -LDL(bad) cholesterol level is elevated which can increase your heart disease risk.  A diet high in fat and simple carbohydrates, genetics and being overweight can contribute to this. ADVISE: exercising 150 minutes of aerobic exercise per week (30 minutes for 5 days per week or 50 minutes for 3 days per week are options) and eating a low saturated fat/low carbohydrate diet are helpful to improve this. In 6 months, you should recheck your fasting cholesterol panel by scheduling a lab-only appointment after switching to generic crestor as previously discussed in clinic.  -Liver and gallbladder tests (ALT,AST, Alk phos,bilirubin) are normal.  -Kidney function (GFR) is normal.  -Sodium is normal.  -Potassium is normal.  -Calcium is normal.  -Glucose is slight elevated and may be a sign of early diabetes (prediabetes). ADVISE:: eating a low carbohydrate diet, exercising, trying to lose weight (if necessary) and rechecking your glucose level in 12 months.  -TSH (thyroid stimulating hormone) level is normal which indicates appropriate thyroid replacement dosing.  ADVISE: continuing same replacement dose and rechecking this in 12 months.  -HIV test is normal.  -Microalbumin (urine protein) test is normal.  ADVISE: rechecking this annually.    Resulted Orders   Lipid panel reflex to direct LDL Fasting   Result Value Ref Range    Cholesterol 230 (H) <200 mg/dL      Comment:      Desirable:       <200 mg/dl    Triglycerides 155 (H) <150 mg/dL      Comment:      Borderline high:  150-199 mg/dl  High:             200-499 mg/dl  Very high:       >499 mg/dl  Fasting specimen      HDL Cholesterol 60 >49 mg/dL    LDL Cholesterol Calculated 139 (H) <100 mg/dL      Comment:      Above desirable:  100-129 mg/dl  Borderline High:  130-159 mg/dL  High:             160-189 mg/dL  Very  high:       >189 mg/dl      Non HDL Cholesterol 170 (H) <130 mg/dL      Comment:      Above Desirable:  130-159 mg/dl  Borderline high:  160-189 mg/dl  High:             190-219 mg/dl  Very high:       >219 mg/dl     Comprehensive metabolic panel   Result Value Ref Range    Sodium 137 133 - 144 mmol/L    Potassium 3.8 3.4 - 5.3 mmol/L    Chloride 102 94 - 109 mmol/L    Carbon Dioxide 29 20 - 32 mmol/L    Anion Gap 6 3 - 14 mmol/L    Glucose 100 (H) 70 - 99 mg/dL      Comment:      Fasting specimen    Urea Nitrogen 14 7 - 30 mg/dL    Creatinine 0.81 0.52 - 1.04 mg/dL    GFR Estimate 72 >60 mL/min/1.7m2      Comment:      Non  GFR Calc    GFR Estimate If Black 87 >60 mL/min/1.7m2      Comment:       GFR Calc    Calcium 10.0 8.5 - 10.1 mg/dL    Bilirubin Total 0.6 0.2 - 1.3 mg/dL    Albumin 4.6 3.4 - 5.0 g/dL    Protein Total 8.2 6.8 - 8.8 g/dL    Alkaline Phosphatase 78 40 - 150 U/L    ALT 50 0 - 50 U/L    AST 33 0 - 45 U/L   TSH with free T4 reflex   Result Value Ref Range    TSH 3.70 0.40 - 4.00 mU/L   Albumin Random Urine Quantitative with Creat Ratio   Result Value Ref Range    Creatinine Urine 98 mg/dL    Albumin Urine mg/L 8 mg/L    Albumin Urine mg/g Cr 8.15 0 - 25 mg/g Cr   HIV Antigen Antibody Combo   Result Value Ref Range    HIV Antigen Antibody Combo Nonreactive NR^Nonreactive          Comment:      HIV-1 p24 Ag & HIV-1/HIV-2 Ab Not Detected       If you have any questions or concerns, please call the clinic at the number listed above.       Sincerely,        Neva Acevedo PA-C

## 2018-12-18 LAB
ALBUMIN SERPL-MCNC: 4.6 G/DL (ref 3.4–5)
ALP SERPL-CCNC: 78 U/L (ref 40–150)
ALT SERPL W P-5'-P-CCNC: 50 U/L (ref 0–50)
ANION GAP SERPL CALCULATED.3IONS-SCNC: 6 MMOL/L (ref 3–14)
AST SERPL W P-5'-P-CCNC: 33 U/L (ref 0–45)
BILIRUB SERPL-MCNC: 0.6 MG/DL (ref 0.2–1.3)
BUN SERPL-MCNC: 14 MG/DL (ref 7–30)
CALCIUM SERPL-MCNC: 10 MG/DL (ref 8.5–10.1)
CHLORIDE SERPL-SCNC: 102 MMOL/L (ref 94–109)
CHOLEST SERPL-MCNC: 230 MG/DL
CO2 SERPL-SCNC: 29 MMOL/L (ref 20–32)
CREAT SERPL-MCNC: 0.81 MG/DL (ref 0.52–1.04)
GFR SERPL CREATININE-BSD FRML MDRD: 72 ML/MIN/1.7M2
GLUCOSE SERPL-MCNC: 100 MG/DL (ref 70–99)
HDLC SERPL-MCNC: 60 MG/DL
HIV 1+2 AB+HIV1 P24 AG SERPL QL IA: NONREACTIVE
LDLC SERPL CALC-MCNC: 139 MG/DL
NONHDLC SERPL-MCNC: 170 MG/DL
POTASSIUM SERPL-SCNC: 3.8 MMOL/L (ref 3.4–5.3)
PROT SERPL-MCNC: 8.2 G/DL (ref 6.8–8.8)
SODIUM SERPL-SCNC: 137 MMOL/L (ref 133–144)
TRIGL SERPL-MCNC: 155 MG/DL
TSH SERPL DL<=0.005 MIU/L-ACNC: 3.7 MU/L (ref 0.4–4)

## 2018-12-23 NOTE — RESULT ENCOUNTER NOTE
Please call or write patient with the following results:    Dear June,    Your recent lab results are noted below:    -LDL(bad) cholesterol level is elevated which can increase your heart disease risk.  A diet high in fat and simple carbohydrates, genetics and being overweight can contribute to this. ADVISE: exercising 150 minutes of aerobic exercise per week (30 minutes for 5 days per week or 50 minutes for 3 days per week are options) and eating a low saturated fat/low carbohydrate diet are helpful to improve this. In 6 months, you should recheck your fasting cholesterol panel by scheduling a lab-only appointment after switching to generic crestor as previously discussed in clinic.  -Liver and gallbladder tests (ALT,AST, Alk phos,bilirubin) are normal.  -Kidney function (GFR) is normal.  -Sodium is normal.  -Potassium is normal.  -Calcium is normal.  -Glucose is slight elevated and may be a sign of early diabetes (prediabetes). ADVISE:: eating a low carbohydrate diet, exercising, trying to lose weight (if necessary) and rechecking your glucose level in 12 months.  -TSH (thyroid stimulating hormone) level is normal which indicates appropriate thyroid replacement dosing.  ADVISE: continuing same replacement dose and rechecking this in 12 months.  -HIV test is normal.  -Microalbumin (urine protein) test is normal.  ADVISE: rechecking this annually.    For additional lab test information, [email or web address] is an excellent reference.  Please contact the clinic at [phone] with any further questions or concerns.      Thank you,      Neva Acevedo PA-C  [facility]

## 2019-01-22 ENCOUNTER — TRANSFERRED RECORDS (OUTPATIENT)
Dept: HEALTH INFORMATION MANAGEMENT | Facility: CLINIC | Age: 59
End: 2019-01-22

## 2019-01-29 ENCOUNTER — MYC MEDICAL ADVICE (OUTPATIENT)
Dept: FAMILY MEDICINE | Facility: CLINIC | Age: 59
End: 2019-01-29

## 2019-01-29 DIAGNOSIS — E03.9 HYPOTHYROIDISM, UNSPECIFIED TYPE: ICD-10-CM

## 2019-01-29 RX ORDER — LEVOTHYROXINE SODIUM 100 UG/1
100 TABLET ORAL DAILY
Qty: 90 TABLET | Refills: 3 | Status: SHIPPED | OUTPATIENT
Start: 2019-01-29 | End: 2020-01-28

## 2019-03-08 ENCOUNTER — MYC MEDICAL ADVICE (OUTPATIENT)
Dept: FAMILY MEDICINE | Facility: CLINIC | Age: 59
End: 2019-03-08

## 2019-03-08 DIAGNOSIS — Z13.0 SCREENING FOR DEFICIENCY ANEMIA: Primary | ICD-10-CM

## 2019-03-11 DIAGNOSIS — Z13.0 SCREENING FOR DEFICIENCY ANEMIA: ICD-10-CM

## 2019-03-11 LAB
BASOPHILS # BLD AUTO: 0 10E9/L (ref 0–0.2)
BASOPHILS NFR BLD AUTO: 0.5 %
DIFFERENTIAL METHOD BLD: NORMAL
EOSINOPHIL # BLD AUTO: 0.1 10E9/L (ref 0–0.7)
EOSINOPHIL NFR BLD AUTO: 1.1 %
ERYTHROCYTE [DISTWIDTH] IN BLOOD BY AUTOMATED COUNT: 12.5 % (ref 10–15)
HCT VFR BLD AUTO: 43.6 % (ref 35–47)
HGB BLD-MCNC: 14.8 G/DL (ref 11.7–15.7)
LYMPHOCYTES # BLD AUTO: 1.6 10E9/L (ref 0.8–5.3)
LYMPHOCYTES NFR BLD AUTO: 27.9 %
MCH RBC QN AUTO: 30.5 PG (ref 26.5–33)
MCHC RBC AUTO-ENTMCNC: 33.9 G/DL (ref 31.5–36.5)
MCV RBC AUTO: 90 FL (ref 78–100)
MONOCYTES # BLD AUTO: 0.6 10E9/L (ref 0–1.3)
MONOCYTES NFR BLD AUTO: 10.2 %
NEUTROPHILS # BLD AUTO: 3.4 10E9/L (ref 1.6–8.3)
NEUTROPHILS NFR BLD AUTO: 60.3 %
PLATELET # BLD AUTO: 267 10E9/L (ref 150–450)
RBC # BLD AUTO: 4.86 10E12/L (ref 3.8–5.2)
WBC # BLD AUTO: 5.7 10E9/L (ref 4–11)

## 2019-03-11 PROCEDURE — 85025 COMPLETE CBC W/AUTO DIFF WBC: CPT | Performed by: PHYSICIAN ASSISTANT

## 2019-03-11 PROCEDURE — 36415 COLL VENOUS BLD VENIPUNCTURE: CPT | Performed by: PHYSICIAN ASSISTANT

## 2019-03-17 NOTE — RESULT ENCOUNTER NOTE
Dear June,      Your recent test results are noted below:    -Normal red blood cell (hgb) levels, normal white blood cell count and normal platelet levels.    For additional lab test information, [email or web address] is an excellent reference. Please contact the clinic at [phone] with any further questions or concerns.    Sincerely,      Neva Acevedo PA-C  [facility]

## 2019-09-17 ENCOUNTER — HOSPITAL PATHOLOGY (OUTPATIENT)
Dept: OTHER | Facility: CLINIC | Age: 59
End: 2019-09-17

## 2019-09-18 LAB — COPATH REPORT: NORMAL

## 2019-09-19 ENCOUNTER — OFFICE VISIT (OUTPATIENT)
Dept: URGENT CARE | Facility: URGENT CARE | Age: 59
End: 2019-09-19
Payer: COMMERCIAL

## 2019-09-19 VITALS
HEART RATE: 113 BPM | OXYGEN SATURATION: 96 % | BODY MASS INDEX: 31.39 KG/M2 | WEIGHT: 177.2 LBS | DIASTOLIC BLOOD PRESSURE: 89 MMHG | TEMPERATURE: 98.3 F | RESPIRATION RATE: 24 BRPM | SYSTOLIC BLOOD PRESSURE: 146 MMHG

## 2019-09-19 DIAGNOSIS — R35.0 URINARY FREQUENCY: ICD-10-CM

## 2019-09-19 DIAGNOSIS — R42 DIZZINESS: Primary | ICD-10-CM

## 2019-09-19 LAB
ALBUMIN UR-MCNC: NEGATIVE MG/DL
APPEARANCE UR: CLEAR
BACTERIA #/AREA URNS HPF: ABNORMAL /HPF
BILIRUB UR QL STRIP: NEGATIVE
COLOR UR AUTO: YELLOW
GLUCOSE UR STRIP-MCNC: NEGATIVE MG/DL
HGB UR QL STRIP: ABNORMAL
KETONES UR STRIP-MCNC: NEGATIVE MG/DL
LEUKOCYTE ESTERASE UR QL STRIP: ABNORMAL
NITRATE UR QL: NEGATIVE
NON-SQ EPI CELLS #/AREA URNS LPF: ABNORMAL /LPF
PH UR STRIP: 7 PH (ref 5–7)
RBC #/AREA URNS AUTO: ABNORMAL /HPF
SOURCE: ABNORMAL
SP GR UR STRIP: <=1.005 (ref 1–1.03)
UROBILINOGEN UR STRIP-ACNC: 0.2 EU/DL (ref 0.2–1)
WBC #/AREA URNS AUTO: ABNORMAL /HPF

## 2019-09-19 PROCEDURE — 87186 SC STD MICRODIL/AGAR DIL: CPT | Performed by: PHYSICIAN ASSISTANT

## 2019-09-19 PROCEDURE — 99214 OFFICE O/P EST MOD 30 MIN: CPT | Performed by: PHYSICIAN ASSISTANT

## 2019-09-19 PROCEDURE — 81001 URINALYSIS AUTO W/SCOPE: CPT | Performed by: PHYSICIAN ASSISTANT

## 2019-09-19 PROCEDURE — 87086 URINE CULTURE/COLONY COUNT: CPT | Performed by: PHYSICIAN ASSISTANT

## 2019-09-19 PROCEDURE — 87088 URINE BACTERIA CULTURE: CPT | Performed by: PHYSICIAN ASSISTANT

## 2019-09-19 RX ORDER — MECLIZINE HYDROCHLORIDE 25 MG/1
25 TABLET ORAL 3 TIMES DAILY PRN
Qty: 30 TABLET | Refills: 0 | Status: SHIPPED | OUTPATIENT
Start: 2019-09-19 | End: 2020-12-21

## 2019-09-19 RX ORDER — SULFAMETHOXAZOLE/TRIMETHOPRIM 800-160 MG
1 TABLET ORAL 2 TIMES DAILY
Qty: 10 TABLET | Refills: 0 | Status: SHIPPED | OUTPATIENT
Start: 2019-09-19 | End: 2019-09-19

## 2019-09-19 RX ORDER — MECLIZINE HYDROCHLORIDE 25 MG/1
25 TABLET ORAL 3 TIMES DAILY PRN
Qty: 30 TABLET | Refills: 0 | Status: SHIPPED | OUTPATIENT
Start: 2019-09-19 | End: 2019-09-19

## 2019-09-19 RX ORDER — SULFAMETHOXAZOLE/TRIMETHOPRIM 800-160 MG
1 TABLET ORAL 2 TIMES DAILY
Qty: 10 TABLET | Refills: 0 | Status: SHIPPED | OUTPATIENT
Start: 2019-09-19 | End: 2019-09-21

## 2019-09-19 ASSESSMENT — ENCOUNTER SYMPTOMS
FLANK PAIN: 0
DIARRHEA: 0
FEVER: 0
RESPIRATORY NEGATIVE: 1
VOMITING: 0
SORE THROAT: 0
CONSTITUTIONAL NEGATIVE: 1
CHEST TIGHTNESS: 0
HEMATURIA: 0
SHORTNESS OF BREATH: 0
FACIAL ASYMMETRY: 0
SINUS PRESSURE: 0
SPEECH DIFFICULTY: 0
FATIGUE: 0
CARDIOVASCULAR NEGATIVE: 1
FREQUENCY: 1
WHEEZING: 0
NAUSEA: 0
NUMBNESS: 0
COUGH: 0
CHILLS: 0
PARESTHESIAS: 0
HEARTBURN: 0
SINUS PAIN: 0
TREMORS: 0
GASTROINTESTINAL NEGATIVE: 1
WEAKNESS: 0
SEIZURES: 0
ABDOMINAL PAIN: 0
HEADACHES: 0
HEMATOCHEZIA: 0
RHINORRHEA: 1
LIGHT-HEADEDNESS: 0
DIZZINESS: 1
CONSTIPATION: 0
DYSURIA: 0
PALPITATIONS: 0

## 2019-09-20 NOTE — PROGRESS NOTES
Subjective   June Nunn is a 59 year old female who presents to clinic today for the following health issues:  HPI   Dizziness    Duration: on and off for the past 5months    Description   Feeling faint:  no   Feeling like the surroundings are moving: no   Loss of consciousness or falls: no     Intensity:  mild    Accompanying signs and symptoms:   Nausea/vomitting: YES  Palpitations: no   Weakness in arms or legs: no   Vision or speech changes: no   Ringing in ears (Tinnitus): YES  Hearing loss related to dizziness: no   Other (fevers/chills/sweating/dyspnea):  Reports plugged R ear sensation along with sinus congestion.  But no pain/pressure/HA or drainage.  No chest pain, SOB, palpitations, orthopnea, PND or peripheral edema.  No HA, visual disturbances, one sided weakness or slurred speech.         History (similar episodes/head trauma/previous evaluation/recent bleeding): was seen at [facility] and told to do nasal spray and decongestant with some relief    Precipitating or alleviating factors (new meds/chemicals):   Worse with activity/head movement: no     Therapies tried and outcome: nasal spray and decongestant with some relief    URINARY TRACT SYMPTOMS    Duration: 3days    Description   frequency, urgency but no dysuria or hematuria    Intensity:  mild    Accompanying signs and symptoms:  Fever/chills: no   Flank pain no   Nausea and vomiting: YES  Vaginal symptoms: No vaginal d/c, bleeding, rashes and irritation.  No new partners.   Abdominal/Pelvic Pain: No abdominal pain, n/v, constipation, diarrhea, bloody or black tarry stools.  No fever, chills or sweats.    History  History of frequent UTI's: no   History of kidney stones: no   Sexually Active: YES  Possibility of pregnancy: No    Precipitating or alleviating factors: None    Therapies tried and outcome: increase fluid intake   Outcome: no relief      Patient Active Problem List   Diagnosis     Hypothyroidism, unspecified type     Simple  [facility]     Living will, counseling/discussion     Heart murmur     Symptomatic menopausal or female climacteric states     Hyperlipidemia with target LDL less than 130     Essential hypertension with goal blood pressure less than 140/90     Elevated liver enzymes- very mild 2013     Bilateral sensorineural hearing loss - left > right     Non morbid obesity due to excess calories     Past Surgical History:   Procedure Laterality Date     HC TOOTH EXTRACTION W/FORCEP      wisdom       Social History     Tobacco Use     Smoking status: Never Smoker     Smokeless tobacco: Never Used   Substance Use Topics     Alcohol use: Yes     Alcohol/week: 1.0 oz     Comment: occasional beer      Family History   Problem Relation Age of Onset     C.A.D. Father         MI at 71,  of prostate Cancer     Cancer Father         prostate cancer     Lipids Mother      Thyroid Disease Mother      Diabetes Mother         type 2 diabetes      Hypertension Mother      Hyperlipidemia Mother      Cerebrovascular Disease Mother 82        stroke from A fib      Hyperlipidemia Brother      Hypertension Brother      Breast Cancer Sister      Thyroid Disease Sister      Breast Cancer Sister      Thyroid Disease Sister      Thyroid Disease Sister      Thyroid Disease Sister      Cancer - colorectal No family hx of          Current Outpatient Medications   Medication Sig Dispense Refill     amLODIPine (NORVASC) 10 MG tablet Take 1 tablet (10 mg) by mouth daily (Patient taking differently: Take 5 mg by mouth daily ) 90 tablet 3     hydrochlorothiazide (HYDRODIURIL) 25 MG tablet Take 0.5 tablets (12.5 mg) by mouth daily 45 tablet 3     levothyroxine (SYNTHROID) 100 MCG tablet Take 1 tablet (100 mcg) by mouth daily 90 tablet 3     meclizine (ANTIVERT) 25 MG tablet Take 1 tablet (25 mg) by mouth 3 times daily as needed for dizziness 30 tablet 0     rosuvastatin (CRESTOR) 5 MG tablet Take 1 tablet (5 mg) by mouth daily 90  tablet 3     sulfamethoxazole-trimethoprim (BACTRIM DS) 800-160 MG tablet Take 1 tablet by mouth 2 times daily 10 tablet 0     Allergies   Allergen Reactions     Lisinopril Other (See Comments)     lisinopril 5mg= severe vertigo     Metoprolol      Ringing in ears & extremities feeling cold     Reviewed and updated as needed this visit by Provider       Review of Systems   Constitutional: Negative.  Negative for chills, fatigue and fever.   HENT: Positive for congestion and rhinorrhea. Negative for ear discharge, ear pain, hearing loss, sinus pressure, sinus pain and sore throat.    Eyes: Negative for visual disturbance.   Respiratory: Negative.  Negative for cough, chest tightness, shortness of breath and wheezing.    Cardiovascular: Negative.  Negative for chest pain, palpitations and peripheral edema.   Gastrointestinal: Negative.  Negative for abdominal pain, constipation, diarrhea, heartburn, hematochezia, nausea and vomiting.   Genitourinary: Positive for frequency and urgency. Negative for dysuria, flank pain, hematuria, pelvic pain, vaginal bleeding and vaginal discharge.   Allergic/Immunologic: Negative for environmental allergies.   Neurological: Positive for dizziness. Negative for tremors, seizures, syncope, facial asymmetry, speech difficulty, weakness, light-headedness, numbness, headaches and paresthesias.   All other systems reviewed and are negative.           Objective    BP (!) 146/89   Pulse 113   Temp 98.3  F (36.8  C) (Oral)   Resp 24   Wt 80.4 kg (177 lb 3.2 oz)   LMP 05/15/2015   SpO2 96%   BMI 31.39 kg/m    Body mass index is 31.39 kg/m .  Physical Exam   Constitutional: She is oriented to person, place, and time. She appears well-developed and well-nourished. No distress.   HENT:   Head: Normocephalic and atraumatic.   Nose: Mucosal edema and rhinorrhea present. No nasal deformity or septal deviation. No epistaxis.  No foreign bodies. Right sinus exhibits no maxillary sinus  tenderness and no frontal sinus tenderness. Left sinus exhibits no maxillary sinus tenderness and no frontal sinus tenderness.   Mouth/Throat: Uvula is midline, oropharynx is clear and moist and mucous membranes are normal. No oropharyngeal exudate or posterior oropharyngeal erythema.   TMs are intact without any erythema or bulging bilaterally.  Airway is patent.     Eyes: Pupils are equal, round, and reactive to light. Conjunctivae and EOM are normal. No scleral icterus.   Neck: Normal range of motion. Neck supple.   Cardiovascular: Normal rate, regular rhythm, normal heart sounds and intact distal pulses. Exam reveals no gallop.   No murmur heard.  Pulmonary/Chest: Effort normal and breath sounds normal. No accessory muscle usage. No respiratory distress. She has no decreased breath sounds. She has no wheezes. She has no rhonchi. She has no rales. She exhibits no tenderness.   Abdominal: Soft. Normal appearance and bowel sounds are normal. She exhibits no mass. There is no hepatosplenomegaly. There is no tenderness. There is no rebound, no guarding, no CVA tenderness, no tenderness at McBurney's point and negative Espinoza's sign. No hernia.   Lymphadenopathy:     She has no cervical adenopathy.   Neurological: She is alert and oriented to person, place, and time. She has normal strength and normal reflexes. No cranial nerve deficit or sensory deficit. She displays a negative Romberg sign. Coordination and gait normal. GCS eye subscore is 4. GCS verbal subscore is 5. GCS motor subscore is 6.   Negative gurinder hallpike maneuver.   Skin: Skin is warm, dry and intact.   Distal pulses are 2+ and symmetric.  No peripheral edema.   Psychiatric: She has a normal mood and affect. Her behavior is normal. Judgment and thought content normal.   Nursing note and vitals reviewed.  Diagnostic Test Results:  Labs reviewed in Epic  Results for orders placed or performed in visit on 09/19/19 (from the past 24 hour(s))   UA reflex to  Microscopic and Culture   Result Value Ref Range    Color Urine Yellow     Appearance Urine Clear     Glucose Urine Negative NEG^Negative mg/dL    Bilirubin Urine Negative NEG^Negative    Ketones Urine Negative NEG^Negative mg/dL    Specific Gravity Urine <=1.005 1.003 - 1.035    Blood Urine Trace (A) NEG^Negative    pH Urine 7.0 5.0 - 7.0 pH    Protein Albumin Urine Negative NEG^Negative mg/dL    Urobilinogen Urine 0.2 0.2 - 1.0 EU/dL    Nitrite Urine Negative NEG^Negative    Leukocyte Esterase Urine Trace (A) NEG^Negative    Source Midstream Urine    Urine Microscopic   Result Value Ref Range    WBC Urine 0 - 5 OTO5^0 - 5 /HPF    RBC Urine O - 2 OTO2^O - 2 /HPF    Squamous Epithelial /LPF Urine Few FEW^Few /LPF    Bacteria Urine Few (A) NEG^Negative /HPF             Assessment & Plan   Dizziness:  No focal neurologic deficits.  ?ETD dysfunction vs BPPV vs intracranial cause vs secondary to UTI.  She has had some relief with nasal steriod and decongestant.  Will give trial of meclizine as needed for dizziness.  Increase fluids, change positions slowly and avoid quick movements of the head.  Will also send to ENT for further evaluation and management as this appears to be chronic.  Recheck in clinic if symptoms worsen or if symptoms do not improve.  To the ER if she develops HA, visual disturbances, one sided weakness or slurred speech or chest pain.    -     OTOLARYNGOLOGY REFERRAL  -     meclizine (ANTIVERT) 25 MG tablet; Take 1 tablet (25 mg) by mouth 3 times daily as needed for dizziness    Urinary frequency:  UA and micro is suspicious for UTI and will empirically treat with bactrim DS X5days.  Will send for UC to help guide abx treatment.  Recommend increase fluids, regular voids, proper wiping techniques and voiding after intercourse.  Educated patient on warning signs of kidney infection and to go to the ER if she develops any of these symptoms.  Recheck in clinic if symptoms worsen or if symptoms do not  improve.  -     UA reflex to Microscopic and Culture  -     Urine Microscopic  -     Urine Culture Aerobic Bacterial  -     sulfamethoxazole-trimethoprim (BACTRIM DS) 800-160 MG tablet; Take 1 tablet by mouth 2 times daily           Amparo Drummond PA-C  [facility]

## 2019-09-21 ENCOUNTER — TELEPHONE (OUTPATIENT)
Dept: URGENT CARE | Facility: URGENT CARE | Age: 59
End: 2019-09-21

## 2019-09-21 RX ORDER — CEFDINIR 300 MG/1
300 CAPSULE ORAL 2 TIMES DAILY
Qty: 20 CAPSULE | Refills: 0 | Status: SHIPPED | OUTPATIENT
Start: 2019-09-21 | End: 2019-10-01

## 2019-09-21 NOTE — TELEPHONE ENCOUNTER
"[facility] Lab result notification:    Reason for call  Notify of lab results and review Neela Rahman PA-C, Urgent Care Provider's recommendation in treatment below:  \"Let patient know urine culture was positive. The Bactrim will not work. Start Cefdinir 300mg, 1 po bid for 10 days, # 20. Please send to patients pharmacy of choice.\"    Lab result  Emergency Dept/Urgent Care discharge antibiotic: Sulfamethoxazole-Trimethoprim (Bactrim DS, Septra DS) 800-160 mg PO tablet,  1 tablet by mouth 2 times daily for 5 days.  Date of Rx (If Applicable): 9/19/19  Recommendations per [place] ED Lab result protocol - Urine culture protocol.    Left voicemail message requesting a call back to [phone] between 10 a.m. and 6:30 p.m. for patient's ED/UC lab results.    Francisco Javier Francisco RN  [facility] [place]  Emergency Dept Lab Result RN  Ph# [phone]    Lab Report below:  Urine Culture Aerobic Bacterial (Order [id number])   9/20/2019 11:42 PM - Interface, [facility] Results     Specimen Information: Midstream Urine        Component   Specimen Description   Midstream Urine    Culture Micro Abnormal    10,000 to 50,000 colonies/mL Escherichia coli    Susceptibility      Escherichia coli    MAKEDA (Preliminary)   AMPICILLIN <=2 ug/mL S   AMPICILLIN/SULBACTAM <=2 ug/mL S   CEFAZOLIN <=4 ug/mL S1   CEFEPIME <=1 ug/mL S   CEFOXITIN <=4 ug/mL S   CEFTAZIDIME <=1 ug/mL S   CEFTRIAXONE <=1 ug/mL S   CIPROFLOXACIN >=4 ug/mL R   GENTAMICIN <=1 ug/mL S   LEVOFLOXACIN >=8 ug/mL R   NITROFURANTOIN <=16 ug/mL S   Piperacillin/Tazo <=4 ug/mL S   TOBRAMYCIN <=1 ug/mL S   Trimethoprim/Sulfa >=16/304 ug/mL R        1         "

## 2019-09-21 NOTE — TELEPHONE ENCOUNTER
"[facility] Lab result notification     Patient/parent Name  June    Reason for call  Notify of lab results and review Neela Rahman PA-C, Urgent Care Provider's recommendation in treatment below:  \"Let patient know urine culture was positive. The Bactrim will not work. Start Cefdinir 300mg, 1 po bid for 10 days, # 20. Please send to patients pharmacy of choice.\"     Lab result  Emergency Dept/Urgent Care discharge antibiotic: Sulfamethoxazole-Trimethoprim (Bactrim DS, Septra DS) 800-160 mg PO tablet,  1 tablet by mouth 2 times daily for 5 days.  Date of Rx (If Applicable): 9/19/19  Recommendations per [place] ED Lab result protocol - Urine culture protocol.    RN Assessment (Patient s current Symptoms), include time called.  [Insert Left message here if message left]  \"The dizziness is gone but my ear is still plugged.  I'm still having all of the urinary tract infection symptoms.  No improvement their.\"  She denies fever or worsening of symptoms    RN Recommendations/Instructions per [facility] ED lab result protocol  Notified of urine culture result and treatment recommendations per Neela Rahman PA-C.  Cefdinir Rx sent to [facility] pharmacy in [place]     Please Contact your PCP clinic or return to the Emergency department if your:    Symptoms do not improve after 3 days on antibiotic.    Symptoms do not resolve after completing antibiotic.    Symptoms worsen or other concerning symptom's.    PCP follow-up Questions asked: YES       [RN Name]  Francisco Javier Francisco RN  [facility]  Emergency Dept Lab Result RN  Ph# [phone]    "

## 2019-09-23 LAB
BACTERIA SPEC CULT: ABNORMAL
SPECIMEN SOURCE: ABNORMAL

## 2019-09-26 ENCOUNTER — TRANSFERRED RECORDS (OUTPATIENT)
Dept: HEALTH INFORMATION MANAGEMENT | Facility: CLINIC | Age: 59
End: 2019-09-26

## 2019-10-06 NOTE — PROGRESS NOTES
I am seeing this patient in consultation for dizziness at the request of the provider Amparo Drummond.    Chief Complaint - hearing loss; dizziness    History of Present Illness - June Nunn is a 59 year old female who presents with ear pressure, right mostly. Has had this happen in 2014. Saw ENT specialty care. MRI brain was normal. it's intermittent. Plane traveling can exaccerbate it, but also sometimes is okay. Feels hearing can be affected. Change blood pressure to nighttime usage helps. She has been under stress. dizziness. The patient describes vertigo in which the entire room spins, or they spin, or there is a sense of motion.  It lasts for minutes. Has had this twice in life. She had a E coli infection too the last time. Has clicking in ears. The patient has tried meclizine.    Past Medical History -   Patient Active Problem List   Diagnosis     Hypothyroidism, unspecified type     Simple goiter     Health Care Home     Living will, counseling/discussion     Heart murmur     Symptomatic menopausal or female climacteric states     Hyperlipidemia with target LDL less than 130     Essential hypertension with goal blood pressure less than 140/90     Elevated liver enzymes- very mild 11/8/2013     Bilateral sensorineural hearing loss - left > right     Non morbid obesity due to excess calories       Current Medications -   Current Outpatient Medications:      amLODIPine (NORVASC) 10 MG tablet, Take 1 tablet (10 mg) by mouth daily (Patient taking differently: Take 5 mg by mouth daily ), Disp: 90 tablet, Rfl: 3     hydrochlorothiazide (HYDRODIURIL) 25 MG tablet, Take 0.5 tablets (12.5 mg) by mouth daily, Disp: 45 tablet, Rfl: 3     levothyroxine (SYNTHROID) 100 MCG tablet, Take 1 tablet (100 mcg) by mouth daily, Disp: 90 tablet, Rfl: 3     meclizine (ANTIVERT) 25 MG tablet, Take 1 tablet (25 mg) by mouth 3 times daily as needed for dizziness, Disp: 30 tablet, Rfl: 0     rosuvastatin (CRESTOR) 5 MG tablet, Take 1  tablet (5 mg) by mouth daily, Disp: 90 tablet, Rfl: 3    Allergies -   Allergies   Allergen Reactions     Lisinopril Other (See Comments)     lisinopril 5mg= severe vertigo     Metoprolol      Ringing in ears & extremities feeling cold       Social History -   Social History     Socioeconomic History     Marital status:      Spouse name: Malik      Number of children: 2     Years of education: 18     Highest education level: Not on file   Occupational History     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[employer]       Employer: NONE    Social Needs     Financial resource strain: Not on file     Food insecurity:     Worry: Not on file     Inability: Not on file     Transportation needs:     Medical: Not on file     Non-medical: Not on file   Tobacco Use     Smoking status: Never Smoker     Smokeless tobacco: Never Used   Substance and Sexual Activity     Alcohol use: Yes     Alcohol/week: 1.7 standard drinks     Comment: occasional beer      Drug use: No     Sexual activity: Never     Comment:  -  Malik  of oral cancer in    Lifestyle     Physical activity:     Days per week: Not on file     Minutes per session: Not on file     Stress: Not on file   Relationships     Social connections:     Talks on phone: Not on file     Gets together: Not on file     Attends Mandaen service: Not on file     Active member of club or organization: Not on file     Attends meetings of clubs or organizations: Not on file     Relationship status: Not on file     Intimate partner violence:     Fear of current or ex partner: Not on file     Emotionally abused: Not on file     Physically abused: Not on file     Forced sexual activity: Not on file   Other Topics Concern      Service Not Asked     Blood Transfusions Not Asked     Caffeine Concern Yes     Comment: couple cup of coffee a day. 12 oz of tea per day      Occupational Exposure Not Asked     Hobby Hazards Not Asked     Sleep Concern No      "Stress Concern No     Weight Concern Not Asked     Special Diet No     Back Care Not Asked     Exercise Yes     Comment: 30 minutes daily     Bike Helmet Not Asked     Seat Belt Yes     Self-Exams Yes     Parent/sibling w/ CABG, MI or angioplasty before 65F 55M? No   Social History Narrative    Getting a master's degree in Education        Is a  since  .  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            Family History -   Family History   Problem Relation Age of Onset     C.A.D. Father         MI at 71,  of prostate Cancer     Cancer Father         prostate cancer     Lipids Mother      Thyroid Disease Mother      Diabetes Mother         type 2 diabetes      Hypertension Mother      Hyperlipidemia Mother      Cerebrovascular Disease Mother 82        stroke from A fib      Hyperlipidemia Brother      Hypertension Brother      Breast Cancer Sister      Thyroid Disease Sister      Breast Cancer Sister      Thyroid Disease Sister      Thyroid Disease Sister      Thyroid Disease Sister      Cancer - colorectal No family hx of        Review of Systems - As per HPI and PMHx, otherwise 10+ comprehensive system review is negative.    Physical Exam  BP (!) 146/95   Pulse 119   Ht 1.6 m (5' 3\")   Wt 80.3 kg (177 lb)   LMP 05/15/2015   SpO2 99%   BMI 31.35 kg/m    General - The patient is in no distress.  Alert and oriented x3, answers questions and cooperates with examination appropriately.   Voice and Breathing - The patient was breathing comfortably without the use of accessory muscles. There was no wheezing, stridor, or stertor.  The patients voice was clear and strong, with no dysphonia.    Eyes - Pupils are reactive to light. Extraocular movements intact. Sclera were not icteric or injected, conjunctiva were pink and moist. No nystagmus.  Ears - The auricles " appeared normal. The external auditory canals were nonedematous and nonerythematous. The tympanic membranes are normal in appearance, bony landmarks are intact.  No retraction, perforation, or masses. No fluid or purulence was seen in the external canal or the middle ear.   Neurologic - Cranial nerves II-XII are grossly intact. Specifically, the facial nerve is intact, House-Brackmann grade 1 of 6.   Mouth - Examination of the oral cavity showed pink, healthy oral mucosa. No lesions or ulcerations noted.  The tongue was mobile and protrudes midline.   Oropharynx - The walls of the oropharynx were smooth, pink, moist, symmetric, and had no lesions or ulcerations. The uvula was midline and the palate raised symmetrically.   Neck -  Palpation of the occipital, submental, submandibular, internal jugular chain, and supraclavicular nodes did not demonstrate any abnormal lymph nodes or masses. The parotid glands were without masses. Palpation of the thyroid was soft and smooth, with no nodules or goiter appreciated.  The trachea was midline.  Cardiovascular - carotid pulses are 2+ bilaterally, regular rhythm      Audiologic Studies - An audiogram and tympanogram were performed today as part of the evaluation and personally reviewed. The tympanogram shows normal Type A curves, with normal canal volumes and middle ear pressures.  There is no sign of eustachian tube dysfunction or middle ear effusion.  She has downsloping sensorineural hearing loss bilaterally worse in the left ear.  She also has a little worse sensorineural hearing loss in the right ear in the low tones.    A/P - June Nunn is a 59 year old female with ear symptoms including fluctuating hearing loss and fullness, worse in the right ear. However, audiogram shows worse hearing overall in the left ear. Has had two bouts of vertigo in her life, one recently with stress. Family history of vertigo.  Her symptoms may represent Ménière's disease.  Fortunately  she is not getting numerous recurrent bouts of vertigo and so at this point Ménière's is just a suspicion.  She also may have more of a cochlear hydrops.  We discussed the disease and ways to manage it with lifestyle and diet changes.  Fortunately she seems better recently.  If things worsen she needs return.  She Casimiro had an MRI for asymmetric hearing loss and outside ENT.  Therefore I do not think we need to repeat this.  She is concerned about her thyroid levels and energy being decreased, and therefore agreed to repeat some thyroid labs.         Kirby Harmon MD  Otolaryngology  [facility]

## 2019-10-08 ENCOUNTER — OFFICE VISIT (OUTPATIENT)
Dept: AUDIOLOGY | Facility: CLINIC | Age: 59
End: 2019-10-08
Payer: COMMERCIAL

## 2019-10-08 ENCOUNTER — OFFICE VISIT (OUTPATIENT)
Dept: OTOLARYNGOLOGY | Facility: CLINIC | Age: 59
End: 2019-10-08
Payer: COMMERCIAL

## 2019-10-08 VITALS
OXYGEN SATURATION: 99 % | BODY MASS INDEX: 31.36 KG/M2 | WEIGHT: 177 LBS | HEIGHT: 63 IN | HEART RATE: 119 BPM | DIASTOLIC BLOOD PRESSURE: 95 MMHG | SYSTOLIC BLOOD PRESSURE: 146 MMHG

## 2019-10-08 DIAGNOSIS — H90.3 SENSORINEURAL HEARING LOSS, BILATERAL: Primary | ICD-10-CM

## 2019-10-08 DIAGNOSIS — H90.3 SNHL (SENSORY-NEURAL HEARING LOSS), ASYMMETRICAL: ICD-10-CM

## 2019-10-08 DIAGNOSIS — R42 DIZZINESS: Primary | ICD-10-CM

## 2019-10-08 DIAGNOSIS — E03.8 OTHER SPECIFIED HYPOTHYROIDISM: ICD-10-CM

## 2019-10-08 LAB
T3 SERPL-MCNC: 124 NG/DL (ref 60–181)
T3FREE SERPL-MCNC: 3 PG/ML (ref 2.3–4.2)

## 2019-10-08 PROCEDURE — 36415 COLL VENOUS BLD VENIPUNCTURE: CPT | Performed by: OTOLARYNGOLOGY

## 2019-10-08 PROCEDURE — 92557 COMPREHENSIVE HEARING TEST: CPT | Performed by: AUDIOLOGIST

## 2019-10-08 PROCEDURE — 99243 OFF/OP CNSLTJ NEW/EST LOW 30: CPT | Performed by: OTOLARYNGOLOGY

## 2019-10-08 PROCEDURE — 92550 TYMPANOMETRY & REFLEX THRESH: CPT | Performed by: AUDIOLOGIST

## 2019-10-08 PROCEDURE — 99207 ZZC NO CHARGE LOS: CPT | Performed by: AUDIOLOGIST

## 2019-10-08 PROCEDURE — 84439 ASSAY OF FREE THYROXINE: CPT | Performed by: OTOLARYNGOLOGY

## 2019-10-08 PROCEDURE — 84481 FREE ASSAY (FT-3): CPT | Performed by: OTOLARYNGOLOGY

## 2019-10-08 PROCEDURE — 84443 ASSAY THYROID STIM HORMONE: CPT | Performed by: OTOLARYNGOLOGY

## 2019-10-08 ASSESSMENT — MIFFLIN-ST. JEOR: SCORE: 1347

## 2019-10-08 NOTE — PROGRESS NOTES
AUDIOLOGY REPORT:    Patient was referred from ENT by Dr. Harmon for audiology evaluation. The patient reports ear pressure and decreased hearing that seem to be worse in the right ear. She reports that she had the same symptoms in February 2014 and she has concerns that it could be related to changing thyroid medications. The patient reports that her hearing and ear fullness seem to vary in severity, and that it seemed much better when she was in [place], [place]. The patient reports that she had been having dizziness, but she had an E. coli infection, which was treated, and she also changed to taking a blood pressure medication at night, which seems to have improved the dizziness. The patient reports that she has had some episodes of vertigo in the past. The patient reports some clicking in her ears but otherwise denies tinnitus bilaterally. The patient denies history of loud noise exposure and family history of hearing loss.    Testing:    Otoscopy:   Otoscopic exam indicates ears are clear of cerumen bilaterally     Tympanograms:    RIGHT: normal eardrum mobility     LEFT:   normal eardrum mobility    Reflexes (reported by stimulus ear):  RIGHT: Ipsilateral is present at normal levels  RIGHT: Contralateral is present at normal levels  LEFT:   Ipsilateral is present at normal levels  LEFT:   Contralateral is present at normal levels    Thresholds:   Pure Tone Thresholds assessed using conventional audiometry with good reliability from 250-8000 Hz bilaterally using insert earphones and circumaural headphones     RIGHT:  mild to moderate essentially sensorineural hearing loss with normal hearing sensitivity at 500-1000 Hz    LEFT:    normal hearing sensitivity through 750 Hz sloping to mild to severe essentially sensorineural hearing loss  NOTE: significant asymmetries with right ear poorer at 250-500 Hz and left ear poorer at 7348-6466 Hz    Speech Reception Threshold:    RIGHT: 35 dB HL    LEFT:   35 dB HL  Results are  in agreement with pure tone average.     Word Recognition Score:     RIGHT: 96% at 75 dB HL using NU-6 recorded word list.    LEFT:   84% at 80 dB HL using NU-6 recorded word list.    Discussed results with the patient. The patient is a candidate for amplification, but she was counseled that the stability of her hearing loss should be established first as she perceives that her hearing is sometimes better than it is today.    Patient was returned to ENT for follow up.     Randa Akers, CCC-A  Licensed Audiologist #[id number]  10/8/2019

## 2019-10-08 NOTE — LETTER
10/8/2019         RE: June Nunn  [address] [address]        Dear Colleague,    Thank you for referring your patient, June Nunn, to the [facility]. Please see a copy of my visit note below.    I am seeing this patient in consultation for dizziness at the request of the provider Amparo Drummond.    Chief Complaint - hearing loss; dizziness    History of Present Illness - June Nunn is a 59 year old female who presents with ear pressure, right mostly. Has had this happen in 2014. Saw ENT specialty care. MRI brain was normal. it's intermittent. Plane traveling can exaccerbate it, but also sometimes is okay. Feels hearing can be affected. Change blood pressure to nighttime usage helps. She has been under stress. dizziness. The patient describes vertigo in which the entire room spins, or they spin, or there is a sense of motion.  It lasts for minutes. Has had this twice in life. She had a E coli infection too the last time. Has clicking in ears. The patient has tried meclizine.    Past Medical History -   Patient Active Problem List   Diagnosis     Hypothyroidism, unspecified type     Simple goiter     Health Care Home     Living will, counseling/discussion     Heart murmur     Symptomatic menopausal or female climacteric states     Hyperlipidemia with target LDL less than 130     Essential hypertension with goal blood pressure less than 140/90     Elevated liver enzymes- very mild 11/8/2013     Bilateral sensorineural hearing loss - left > right     Non morbid obesity due to excess calories       Current Medications -   Current Outpatient Medications:      amLODIPine (NORVASC) 10 MG tablet, Take 1 tablet (10 mg) by mouth daily (Patient taking differently: Take 5 mg by mouth daily ), Disp: 90 tablet, Rfl: 3     hydrochlorothiazide (HYDRODIURIL) 25 MG tablet, Take 0.5 tablets (12.5 mg) by mouth daily, Disp: 45 tablet, Rfl: 3     levothyroxine (SYNTHROID) 100 MCG tablet,  Take 1 tablet (100 mcg) by mouth daily, Disp: 90 tablet, Rfl: 3     meclizine (ANTIVERT) 25 MG tablet, Take 1 tablet (25 mg) by mouth 3 times daily as needed for dizziness, Disp: 30 tablet, Rfl: 0     rosuvastatin (CRESTOR) 5 MG tablet, Take 1 tablet (5 mg) by mouth daily, Disp: 90 tablet, Rfl: 3    Allergies -   Allergies   Allergen Reactions     Lisinopril Other (See Comments)     lisinopril 5mg= severe vertigo     Metoprolol      Ringing in ears & extremities feeling cold       Social History -   Social History     Socioeconomic History     Marital status:      Spouse name: Malik      Number of children: 2     Years of education: 18     Highest education level: Not on file   Occupational History     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[employer]       Employer: NONE    Social Needs     Financial resource strain: Not on file     Food insecurity:     Worry: Not on file     Inability: Not on file     Transportation needs:     Medical: Not on file     Non-medical: Not on file   Tobacco Use     Smoking status: Never Smoker     Smokeless tobacco: Never Used   Substance and Sexual Activity     Alcohol use: Yes     Alcohol/week: 1.7 standard drinks     Comment: occasional beer      Drug use: No     Sexual activity: Never     Comment:  -  Malik  of oral cancer in    Lifestyle     Physical activity:     Days per week: Not on file     Minutes per session: Not on file     Stress: Not on file   Relationships     Social connections:     Talks on phone: Not on file     Gets together: Not on file     Attends Cheondoism service: Not on file     Active member of club or organization: Not on file     Attends meetings of clubs or organizations: Not on file     Relationship status: Not on file     Intimate partner violence:     Fear of current or ex partner: Not on file     Emotionally abused: Not on file     Physically abused: Not on file     Forced sexual activity: Not on file   Other Topics Concern  "     Service Not Asked     Blood Transfusions Not Asked     Caffeine Concern Yes     Comment: couple cup of coffee a day. 12 oz of tea per day      Occupational Exposure Not Asked     Hobby Hazards Not Asked     Sleep Concern No     Stress Concern No     Weight Concern Not Asked     Special Diet No     Back Care Not Asked     Exercise Yes     Comment: 30 minutes daily     Bike Helmet Not Asked     Seat Belt Yes     Self-Exams Yes     Parent/sibling w/ CABG, MI or angioplasty before 65F 55M? No   Social History Narrative    Getting a master's degree in Education        Is a  since  .  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            Family History -   Family History   Problem Relation Age of Onset     C.A.D. Father         MI at 71,  of prostate Cancer     Cancer Father         prostate cancer     Lipids Mother      Thyroid Disease Mother      Diabetes Mother         type 2 diabetes      Hypertension Mother      Hyperlipidemia Mother      Cerebrovascular Disease Mother 82        stroke from A fib      Hyperlipidemia Brother      Hypertension Brother      Breast Cancer Sister      Thyroid Disease Sister      Breast Cancer Sister      Thyroid Disease Sister      Thyroid Disease Sister      Thyroid Disease Sister      Cancer - colorectal No family hx of        Review of Systems - As per HPI and PMHx, otherwise 10+ comprehensive system review is negative.    Physical Exam  BP (!) 146/95   Pulse 119   Ht 1.6 m (5' 3\")   Wt 80.3 kg (177 lb)   LMP 05/15/2015   SpO2 99%   BMI 31.35 kg/m     General - The patient is in no distress.  Alert and oriented x3, answers questions and cooperates with examination appropriately.   Voice and Breathing - The patient was breathing comfortably without the use of accessory muscles. There was no wheezing, stridor, or stertor. "  The patients voice was clear and strong, with no dysphonia.    Eyes - Pupils are reactive to light. Extraocular movements intact. Sclera were not icteric or injected, conjunctiva were pink and moist. No nystagmus.  Ears - The auricles appeared normal. The external auditory canals were nonedematous and nonerythematous. The tympanic membranes are normal in appearance, bony landmarks are intact.  No retraction, perforation, or masses. No fluid or purulence was seen in the external canal or the middle ear.   Neurologic - Cranial nerves II-XII are grossly intact. Specifically, the facial nerve is intact, House-Brackmann grade 1 of 6.   Mouth - Examination of the oral cavity showed pink, healthy oral mucosa. No lesions or ulcerations noted.  The tongue was mobile and protrudes midline.   Oropharynx - The walls of the oropharynx were smooth, pink, moist, symmetric, and had no lesions or ulcerations. The uvula was midline and the palate raised symmetrically.   Neck -  Palpation of the occipital, submental, submandibular, internal jugular chain, and supraclavicular nodes did not demonstrate any abnormal lymph nodes or masses. The parotid glands were without masses. Palpation of the thyroid was soft and smooth, with no nodules or goiter appreciated.  The trachea was midline.  Cardiovascular - carotid pulses are 2+ bilaterally, regular rhythm      Audiologic Studies - An audiogram and tympanogram were performed today as part of the evaluation and personally reviewed. The tympanogram shows normal Type A curves, with normal canal volumes and middle ear pressures.  There is no sign of eustachian tube dysfunction or middle ear effusion.  She has downsloping sensorineural hearing loss bilaterally worse in the left ear.  She also has a little worse sensorineural hearing loss in the right ear in the low tones.    A/P - June Nunn is a 59 year old female with ear symptoms including fluctuating hearing loss and fullness, worse in  the right ear. However, audiogram shows worse hearing overall in the left ear. Has had two bouts of vertigo in her life, one recently with stress. Family history of vertigo.  Her symptoms may represent Ménière's disease.  Fortunately she is not getting numerous recurrent bouts of vertigo and so at this point Ménière's is just a suspicion.  She also may have more of a cochlear hydrops.  We discussed the disease and ways to manage it with lifestyle and diet changes.  Fortunately she seems better recently.  If things worsen she needs return.  She Casimiro had an MRI for asymmetric hearing loss and outside ENT.  Therefore I do not think we need to repeat this.  She is concerned about her thyroid levels and energy being decreased, and therefore agreed to repeat some thyroid labs.         Kirby Harmon MD  Otolaryngology  [facility]      Again, thank you for allowing me to participate in the care of your patient.        Sincerely,        Kirby Harmon MD

## 2019-10-08 NOTE — PATIENT INSTRUCTIONS
General Scheduling Information  To schedule your CT/MRI scan, please contact [facility] at [phone] [address] [place] [address]    To schedule your Surgery, please contact our Specialty Schedulers at [phone]    ENT Clinic Locations Clinic Hours Telephone Number     [facility]  [address]  [place] [address]   Tuesday:       8:00am -- 4:00pm    Wednesday:  8:00am - 4:00pm   To schedule an appointment with   Dr. Harmon,   please contact our   Specialty Scheduling Department at:     [phone]       [facility]  [address] [place] [address]   Friday:          8:00am - 4:00pm         Urgent Care Locations Clinic Hours Telephone Numbers     [facility]  [address] [place] [address]     Monday-Friday:     11:00pm - 9:00pm    Saturday-Sunday:  9:00am - 5:00pm   [phone]     [facility]  [address] [place] [address]     Monday-Friday:      5:00pm - 9:00pm     Saturday-Sunday:  9:00am - 5:00pm   [phone]     June to follow up with Primary Care provider regarding elevated blood pressure.

## 2019-10-09 LAB
T4 FREE SERPL-MCNC: 1.21 NG/DL (ref 0.76–1.46)
TSH SERPL DL<=0.005 MIU/L-ACNC: 1.31 MU/L (ref 0.4–4)

## 2019-11-21 ENCOUNTER — MYC MEDICAL ADVICE (OUTPATIENT)
Dept: FAMILY MEDICINE | Facility: CLINIC | Age: 59
End: 2019-11-21

## 2019-12-16 ENCOUNTER — DOCUMENTATION ONLY (OUTPATIENT)
Dept: LAB | Facility: CLINIC | Age: 59
End: 2019-12-16

## 2019-12-16 NOTE — PROGRESS NOTES
New patient to us, called and rescheduled patient to an earlier appt and she will go to lab after her visit.  Susie Monroy RN

## 2019-12-27 ENCOUNTER — ANCILLARY PROCEDURE (OUTPATIENT)
Dept: MAMMOGRAPHY | Facility: CLINIC | Age: 59
End: 2019-12-27
Attending: NURSE PRACTITIONER
Payer: COMMERCIAL

## 2019-12-27 DIAGNOSIS — E78.5 HYPERLIPIDEMIA WITH TARGET LDL LESS THAN 130: ICD-10-CM

## 2019-12-27 DIAGNOSIS — I10 ESSENTIAL HYPERTENSION WITH GOAL BLOOD PRESSURE LESS THAN 140/90: ICD-10-CM

## 2019-12-27 DIAGNOSIS — Z12.31 VISIT FOR SCREENING MAMMOGRAM: ICD-10-CM

## 2019-12-27 DIAGNOSIS — E03.9 HYPOTHYROIDISM, UNSPECIFIED TYPE: ICD-10-CM

## 2019-12-27 PROCEDURE — 77067 SCR MAMMO BI INCL CAD: CPT | Performed by: RADIOLOGY

## 2019-12-27 RX ORDER — HYDROCHLOROTHIAZIDE 25 MG/1
12.5 TABLET ORAL DAILY
Qty: 15 TABLET | Refills: 0 | Status: SHIPPED | OUTPATIENT
Start: 2019-12-27 | End: 2020-01-28

## 2019-12-27 RX ORDER — AMLODIPINE BESYLATE 10 MG/1
10 TABLET ORAL DAILY
Qty: 30 TABLET | Refills: 0 | Status: SHIPPED | OUTPATIENT
Start: 2019-12-27 | End: 2020-01-28

## 2019-12-27 RX ORDER — ROSUVASTATIN CALCIUM 5 MG/1
5 TABLET, COATED ORAL DAILY
Qty: 30 TABLET | Refills: 0 | Status: SHIPPED | OUTPATIENT
Start: 2019-12-27 | End: 2020-01-28

## 2019-12-27 NOTE — TELEPHONE ENCOUNTER
"Requested Prescriptions   Pending Prescriptions Disp Refills     amLODIPine (NORVASC) 10 MG tablet  Last Written Prescription Date:  12/17/2018  Last Fill Quantity: 90 tablet,  # refills: 3   Last office visit: 12/17/2018 with prescribing provider:  Kristina Newman Office Visit:   Next 5 appointments (look out 90 days)    Jan 28, 2020  1:30 PM CST  PHYSICAL with MARGO Haney CNP  [facility] [[email or web address]] ([facility] [[email or web address]]) [address] [place] [id number] [phone]            90 tablet 3     Sig: Take 1 tablet (10 mg) by mouth daily       Calcium Channel Blockers Protocol  Failed - 12/27/2019  3:54 PM        Failed - Blood pressure under 140/90 in past 12 months     BP Readings from Last 3 Encounters:   10/08/19 (!) 146/95   09/19/19 (!) 146/89   12/17/18 122/76                 Failed - Recent (12 mo) or future (30 days) visit within the authorizing provider's specialty     Patient has had an office visit with the authorizing provider or a provider within the authorizing providers department within the previous 12 mos or has a future within next 30 days. See \"Patient Info\" tab in inbasket, or \"Choose Columns\" in Meds & Orders section of the refill encounter.              Failed - Normal serum creatinine on file in past 12 months     Recent Labs   Lab Test 12/17/18  0932   CR 0.81             Passed - Medication is active on med list        Passed - Patient is age 18 or older        Passed - No active pregnancy on record        Passed - No positive pregnancy test in past 12 months                  hydrochlorothiazide (HYDRODIURIL) 25 MG tablet  Last Written Prescription Date:  12/3/2019  Last Fill Quantity: 15 tablet,  # refills: 0   Last office visit: 12/17/2018 with prescribing provider:  Kristina Newman Office Visit:   Next 5 appointments (look out 90 days)    Jan 28, 2020  1:30 PM CST  PHYSICAL with MARGO Haney CNP  [facility]" "Clinics ([facility]) [address] [facility] [address] [phone]            15 tablet 0     Sig: Take 0.5 tablets (12.5 mg) by mouth daily       Diuretics (Including Combos) Protocol Failed - 12/27/2019  3:54 PM        Failed - Blood pressure under 140/90 in past 12 months     BP Readings from Last 3 Encounters:   10/08/19 (!) 146/95   09/19/19 (!) 146/89   12/17/18 122/76                 Failed - Recent (12 mo) or future (30 days) visit within the authorizing provider's specialty     Patient has had an office visit with the authorizing provider or a provider within the authorizing providers department within the previous 12 mos or has a future within next 30 days. See \"Patient Info\" tab in inbasket, or \"Choose Columns\" in Meds & Orders section of the refill encounter.              Failed - Normal serum creatinine on file in past 12 months     Recent Labs   Lab Test 12/17/18  0932   CR 0.81              Failed - Normal serum potassium on file in past 12 months     Recent Labs   Lab Test 12/17/18  0932   POTASSIUM 3.8                    Failed - Normal serum sodium on file in past 12 months     Recent Labs   Lab Test 12/17/18  0932                 Passed - Medication is active on med list        Passed - Patient is age 18 or older        Passed - No active pregancy on record        Passed - No positive pregnancy test in past 12 months                  rosuvastatin (CRESTOR) 5 MG tablet  Last Written Prescription Date:  12/3/2019  Last Fill Quantity: 30 tablet,  # refills: 0   Last office visit: 12/17/2018 with prescribing provider:  Kristina     Future Office Visit:   Next 5 appointments (look out 90 days)    Jan 28, 2020  1:30 PM CST  PHYSICAL with MARGO Haney CNP  [facility] ([facility]) [address] [address] [place] [id number] [phone]            30 tablet 0     Sig: Take 1 tablet (5 mg) by mouth daily       " "Statins Protocol Failed - 12/27/2019  3:54 PM        Failed - LDL on file in past 12 months     Recent Labs   Lab Test 12/17/18  0932   *             Failed - Recent (12 mo) or future (30 days) visit within the authorizing provider's specialty     Patient has had an office visit with the authorizing provider or a provider within the authorizing providers department within the previous 12 mos or has a future within next 30 days. See \"Patient Info\" tab in inbasket, or \"Choose Columns\" in Meds & Orders section of the refill encounter.              Passed - No abnormal creatine kinase in past 12 months     Recent Labs   Lab Test 12/31/15  0822   *                Passed - Medication is active on med list        Passed - Patient is age 18 or older        Passed - No active pregnancy on record        Passed - No positive pregnancy test in past 12 months        "

## 2019-12-27 NOTE — TELEPHONE ENCOUNTER
Medication is being filled for 1 time refill only due to:  Patient needs to be seen because it has been more than one year since last visit. Patient has appointment set up end of January. Appears may be switching PMD--may have moved?. Dedra Bolton R.N.

## 2020-01-28 ENCOUNTER — ANCILLARY PROCEDURE (OUTPATIENT)
Dept: CT IMAGING | Facility: CLINIC | Age: 60
End: 2020-01-28
Attending: NURSE PRACTITIONER
Payer: COMMERCIAL

## 2020-01-28 ENCOUNTER — OFFICE VISIT (OUTPATIENT)
Dept: PEDIATRICS | Facility: CLINIC | Age: 60
End: 2020-01-28
Payer: COMMERCIAL

## 2020-01-28 VITALS
BODY MASS INDEX: 32.28 KG/M2 | DIASTOLIC BLOOD PRESSURE: 75 MMHG | OXYGEN SATURATION: 99 % | HEART RATE: 100 BPM | SYSTOLIC BLOOD PRESSURE: 140 MMHG | HEIGHT: 63 IN | TEMPERATURE: 96.7 F | WEIGHT: 182.2 LBS

## 2020-01-28 DIAGNOSIS — Z13.1 SCREENING FOR DIABETES MELLITUS (DM): ICD-10-CM

## 2020-01-28 DIAGNOSIS — Z13.6 CARDIOVASCULAR SCREENING; LDL GOAL LESS THAN 160: ICD-10-CM

## 2020-01-28 DIAGNOSIS — E78.5 HYPERLIPIDEMIA WITH TARGET LDL LESS THAN 130: ICD-10-CM

## 2020-01-28 DIAGNOSIS — I10 ESSENTIAL HYPERTENSION WITH GOAL BLOOD PRESSURE LESS THAN 140/90: ICD-10-CM

## 2020-01-28 DIAGNOSIS — Z12.4 SCREENING FOR MALIGNANT NEOPLASM OF CERVIX: ICD-10-CM

## 2020-01-28 DIAGNOSIS — E83.52 SERUM CALCIUM ELEVATED: ICD-10-CM

## 2020-01-28 DIAGNOSIS — J32.9 CHRONIC CONGESTION OF PARANASAL SINUS: ICD-10-CM

## 2020-01-28 DIAGNOSIS — E03.9 HYPOTHYROIDISM, UNSPECIFIED TYPE: ICD-10-CM

## 2020-01-28 DIAGNOSIS — R79.89 ELEVATED LFTS: ICD-10-CM

## 2020-01-28 DIAGNOSIS — Z00.00 ROUTINE GENERAL MEDICAL EXAMINATION AT A HEALTH CARE FACILITY: Primary | ICD-10-CM

## 2020-01-28 DIAGNOSIS — Z13.0 SCREENING FOR DISORDER OF BLOOD AND BLOOD-FORMING ORGANS: ICD-10-CM

## 2020-01-28 LAB
ALBUMIN SERPL-MCNC: 4.5 G/DL (ref 3.4–5)
ALP SERPL-CCNC: 84 U/L (ref 40–150)
ALT SERPL W P-5'-P-CCNC: 81 U/L (ref 0–50)
ANION GAP SERPL CALCULATED.3IONS-SCNC: 9 MMOL/L (ref 3–14)
AST SERPL W P-5'-P-CCNC: 55 U/L (ref 0–45)
BILIRUB SERPL-MCNC: 0.5 MG/DL (ref 0.2–1.3)
BUN SERPL-MCNC: 15 MG/DL (ref 7–30)
CALCIUM SERPL-MCNC: 10.3 MG/DL (ref 8.5–10.1)
CHLORIDE SERPL-SCNC: 101 MMOL/L (ref 94–109)
CHOLEST SERPL-MCNC: 191 MG/DL
CO2 SERPL-SCNC: 29 MMOL/L (ref 20–32)
CREAT SERPL-MCNC: 0.83 MG/DL (ref 0.52–1.04)
CREAT UR-MCNC: 164 MG/DL
ERYTHROCYTE [DISTWIDTH] IN BLOOD BY AUTOMATED COUNT: 11.9 % (ref 10–15)
GFR SERPL CREATININE-BSD FRML MDRD: 77 ML/MIN/{1.73_M2}
GLUCOSE SERPL-MCNC: 102 MG/DL (ref 70–99)
HCT VFR BLD AUTO: 45.1 % (ref 35–47)
HDLC SERPL-MCNC: 57 MG/DL
HGB BLD-MCNC: 15.2 G/DL (ref 11.7–15.7)
LDLC SERPL CALC-MCNC: 104 MG/DL
MCH RBC QN AUTO: 29.7 PG (ref 26.5–33)
MCHC RBC AUTO-ENTMCNC: 33.7 G/DL (ref 31.5–36.5)
MCV RBC AUTO: 88 FL (ref 78–100)
MICROALBUMIN UR-MCNC: 29 MG/L
MICROALBUMIN/CREAT UR: 17.56 MG/G CR (ref 0–25)
NONHDLC SERPL-MCNC: 139 MG/DL
PLATELET # BLD AUTO: 183 10E9/L (ref 150–450)
POTASSIUM SERPL-SCNC: 4.1 MMOL/L (ref 3.4–5.3)
PROT SERPL-MCNC: 8.6 G/DL (ref 6.8–8.8)
RBC # BLD AUTO: 5.11 10E12/L (ref 3.8–5.2)
SODIUM SERPL-SCNC: 139 MMOL/L (ref 133–144)
T4 FREE SERPL-MCNC: 1.44 NG/DL (ref 0.76–1.46)
TRIGL SERPL-MCNC: 175 MG/DL
TSH SERPL DL<=0.005 MIU/L-ACNC: 1.69 MU/L (ref 0.4–4)
WBC # BLD AUTO: 5.2 10E9/L (ref 4–11)

## 2020-01-28 PROCEDURE — 85027 COMPLETE CBC AUTOMATED: CPT | Performed by: NURSE PRACTITIONER

## 2020-01-28 PROCEDURE — G0145 SCR C/V CYTO,THINLAYER,RESCR: HCPCS | Performed by: NURSE PRACTITIONER

## 2020-01-28 PROCEDURE — 70486 CT MAXILLOFACIAL W/O DYE: CPT | Performed by: RADIOLOGY

## 2020-01-28 PROCEDURE — 99213 OFFICE O/P EST LOW 20 MIN: CPT | Mod: 25 | Performed by: NURSE PRACTITIONER

## 2020-01-28 PROCEDURE — 36415 COLL VENOUS BLD VENIPUNCTURE: CPT | Performed by: NURSE PRACTITIONER

## 2020-01-28 PROCEDURE — 82785 ASSAY OF IGE: CPT | Performed by: NURSE PRACTITIONER

## 2020-01-28 PROCEDURE — 82306 VITAMIN D 25 HYDROXY: CPT | Performed by: NURSE PRACTITIONER

## 2020-01-28 PROCEDURE — G0476 HPV COMBO ASSAY CA SCREEN: HCPCS | Performed by: NURSE PRACTITIONER

## 2020-01-28 PROCEDURE — 80061 LIPID PANEL: CPT | Performed by: NURSE PRACTITIONER

## 2020-01-28 PROCEDURE — 86003 ALLG SPEC IGE CRUDE XTRC EA: CPT | Performed by: NURSE PRACTITIONER

## 2020-01-28 PROCEDURE — 84443 ASSAY THYROID STIM HORMONE: CPT | Performed by: NURSE PRACTITIONER

## 2020-01-28 PROCEDURE — 99396 PREV VISIT EST AGE 40-64: CPT | Performed by: NURSE PRACTITIONER

## 2020-01-28 PROCEDURE — 82043 UR ALBUMIN QUANTITATIVE: CPT | Performed by: NURSE PRACTITIONER

## 2020-01-28 PROCEDURE — 80053 COMPREHEN METABOLIC PANEL: CPT | Performed by: NURSE PRACTITIONER

## 2020-01-28 PROCEDURE — 84439 ASSAY OF FREE THYROXINE: CPT | Performed by: NURSE PRACTITIONER

## 2020-01-28 RX ORDER — HYDROCHLOROTHIAZIDE 25 MG/1
12.5 TABLET ORAL DAILY
Qty: 45 TABLET | Refills: 3 | Status: SHIPPED | OUTPATIENT
Start: 2020-01-28 | End: 2021-02-15

## 2020-01-28 RX ORDER — LEVOTHYROXINE SODIUM 100 MCG
100 TABLET ORAL DAILY
Qty: 90 TABLET | Refills: 3 | Status: SHIPPED | OUTPATIENT
Start: 2020-01-28 | End: 2021-01-18

## 2020-01-28 RX ORDER — ROSUVASTATIN CALCIUM 5 MG/1
5 TABLET, COATED ORAL DAILY
Qty: 90 TABLET | Refills: 3 | Status: SHIPPED | OUTPATIENT
Start: 2020-01-28 | End: 2021-02-15

## 2020-01-28 RX ORDER — AMLODIPINE BESYLATE 10 MG/1
10 TABLET ORAL DAILY
Qty: 90 TABLET | Refills: 3 | Status: SHIPPED | OUTPATIENT
Start: 2020-01-28 | End: 2021-02-15

## 2020-01-28 ASSESSMENT — MIFFLIN-ST. JEOR: SCORE: 1370.58

## 2020-01-28 NOTE — PATIENT INSTRUCTIONS
PLAN:   1.   Symptomatic therapy suggested: Continue current medication regimen unchanged.  Increase calcium to 1000mg and 1000iu Vit D  2.  Orders Placed This Encounter   Medications     amLODIPine (NORVASC) 10 MG tablet     Sig: Take 1 tablet (10 mg) by mouth daily     Dispense:  90 tablet     Refill:  3     hydrochlorothiazide (HYDRODIURIL) 25 MG tablet     Sig: Take 0.5 tablets (12.5 mg) by mouth daily     Dispense:  45 tablet     Refill:  3     SYNTHROID 100 MCG tablet     Sig: Take 1 tablet (100 mcg) by mouth daily     Dispense:  90 tablet     Refill:  3     rosuvastatin (CRESTOR) 5 MG tablet     Sig: Take 1 tablet (5 mg) by mouth daily     Dispense:  90 tablet     Refill:  3     Orders Placed This Encounter   Procedures     CT Sinus w/o Contrast     Lipid panel reflex to direct LDL Fasting     JUST IN CASE     Comprehensive metabolic panel     Lipid panel reflex to direct LDL Fasting     JUST IN CASE     TSH     T4 free     Albumin Random Urine Quantitative with Creat Ratio     CBC with platelets     Comprehensive metabolic panel     Vitamin D Deficiency     [place] Resp Allergen Panel     Allergen egg white IgE     Allergen sesame seed IgE     Allergen peanut IgE     Allergen soybean IgE     Allergen hazelnut IgE     Allergen milk IgE     Allergen almonds IgE     Allergen cashew IgE     Allergen shrimp IgE     Allergen walnuts IgE     Allergen codfish IgE     Allergen scallop IgE     Allergen wheat IgE     Allergen tuna IgE     Allergen salmon IgE     Pap imaged thin layer screen with HPV - recommended age 30 - 65 years (select HPV order below)     HPV High Risk Types DNA Cervical       3. Patient needs to follow up in if no improvement,or sooner if worsening of symptoms or other symptoms develop.  FURTHER TESTING:       - CT sinus   Work on weight loss  Regular exercise  Will follow up and/or notify patient of  results via My Chart to determine further need for followup  Follow up office visit in one year  for annual health maintenance exam, sooner PRN.    Preventive Health Recommendations  Female Ages 50 - 64    Yearly exam: See your health care provider every year in order to  o Review health changes.   o Discuss preventive care.    o Review your medicines if your doctor has prescribed any.      Get a Pap test every three years (unless you have an abnormal result and your provider advises testing more often).    If you get Pap tests with HPV test, you only need to test every 5 years, unless you have an abnormal result.     You do not need a Pap test if your uterus was removed (hysterectomy) and you have not had cancer.    You should be tested each year for STDs (sexually transmitted diseases) if you're at risk.     Have a mammogram every 1 to 2 years.    Have a colonoscopy at age 50, or have a yearly FIT test (stool test). These exams screen for colon cancer.      Have a cholesterol test every 5 years, or more often if advised.    Have a diabetes test (fasting glucose) every three years. If you are at risk for diabetes, you should have this test more often.     If you are at risk for osteoporosis (brittle bone disease), think about having a bone density scan (DEXA).    Shots: Get a flu shot each year. Get a tetanus shot every 10 years.    Nutrition:     Eat at least 5 servings of fruits and vegetables each day.    Eat whole-grain bread, whole-wheat pasta and brown rice instead of white grains and rice.    Get adequate Calcium and Vitamin D.     Lifestyle    Exercise at least 150 minutes a week (30 minutes a day, 5 days a week). This will help you control your weight and prevent disease.    Limit alcohol to one drink per day.    No smoking.     Wear sunscreen to prevent skin cancer.     See your dentist every six months for an exam and cleaning.    See your eye doctor every 1 to 2 years.

## 2020-01-28 NOTE — PROGRESS NOTES
SUBJECTIVE:   CC: June Nunn is an 59 year old woman who presents for preventive health visit.     Healthy Habits:     Getting at least 3 servings of Calcium per day:  Yes    Bi-annual eye exam:  Yes    Dental care twice a year:  Yes    Sleep apnea or symptoms of sleep apnea:  None    Diet:  Regular (no restrictions)    Frequency of exercise:  6-7 days/week    Duration of exercise:  45-60 minutes    Taking medications regularly:  Yes    Medication side effects:  Muscle aches, Significant flushing and Lightheadedness    PHQ-2 Total Score: 0    Additional concerns today:  Yes     1. Discuss getting off dizziness medication    Hyperlipidemia Follow-Up      Are you regularly taking any medication or supplement to lower your cholesterol?   Yes- crestor    Are you having muscle aches or other side effects that you think could be caused by your cholesterol lowering medication?  Yes- muscleaches    Hypertension Follow-up      Do you check your blood pressure regularly outside of the clinic? Yes     Are you following a low salt diet? Yes    Are your blood pressures ever more than 140 on the top number (systolic) OR more   than 90 on the bottom number (diastolic), for example 140/90? Yes    Hypothyroidism Follow-up      Since last visit, patient describes the following symptoms: Weight stable, no hair loss, no skin changes, no constipation, no loose stools-does not feel right      Today's PHQ-2 Score:   PHQ-2 ( 1999 Pfizer) 1/28/2020   Q1: Little interest or pleasure in doing things 0   Q2: Feeling down, depressed or hopeless 0   PHQ-2 Score 0   Q1: Little interest or pleasure in doing things -   Q2: Feeling down, depressed or hopeless -   PHQ-2 Score -       Abuse: Current or Past(Physical, Sexual or Emotional)- No  Do you feel safe in your environment? Yes        Social History     Tobacco Use     Smoking status: Never Smoker     Smokeless tobacco: Never Used   Substance Use Topics     Alcohol use: Yes      Alcohol/week: 1.7 standard drinks     Comment: occasional beer      If you drink alcohol do you typically have >3 drinks per day or >7 drinks per week? No        Reviewed orders with patient.  Reviewed health maintenance and updated orders accordingly - Yes  Labs reviewed in EPIC  BP Readings from Last 3 Encounters:   20 (!) 140/75   10/08/19 (!) 146/95   19 (!) 146/89    Wt Readings from Last 3 Encounters:   20 82.6 kg (182 lb 3.2 oz)   10/08/19 80.3 kg (177 lb)   19 80.4 kg (177 lb 3.2 oz)               Patient Active Problem List   Diagnosis     Hypothyroidism, unspecified type     Simple goiter     Health Care Home     Living will, counseling/discussion     Heart murmur     Symptomatic menopausal or female climacteric states     Hyperlipidemia with target LDL less than 130     Essential hypertension with goal blood pressure less than 140/90     Elevated liver enzymes- very mild 2013     Bilateral sensorineural hearing loss - left > right     Non morbid obesity due to excess calories     Past Surgical History:   Procedure Laterality Date     HC TOOTH EXTRACTION W/FORCEP      wisdom       Social History     Tobacco Use     Smoking status: Never Smoker     Smokeless tobacco: Never Used   Substance Use Topics     Alcohol use: Yes     Alcohol/week: 1.7 standard drinks     Comment: occasional beer      Family History   Problem Relation Age of Onset     C.A.D. Father         MI at 71,  of prostate Cancer     Cancer Father         prostate cancer     Lipids Mother      Thyroid Disease Mother      Diabetes Mother         type 2 diabetes      Hypertension Mother      Hyperlipidemia Mother      Cerebrovascular Disease Mother 82        stroke from A fib      Hyperlipidemia Brother      Hypertension Brother      Breast Cancer Sister      Thyroid Disease Sister      Hyperlipidemia Sister      Breast Cancer Sister      Thyroid Disease Sister      Thyroid Disease Sister      Thyroid Disease  Sister      Cancer - colorectal No family hx of          Current Outpatient Medications   Medication Sig Dispense Refill     amLODIPine (NORVASC) 10 MG tablet Take 1 tablet (10 mg) by mouth daily 90 tablet 3     hydrochlorothiazide (HYDRODIURIL) 25 MG tablet Take 0.5 tablets (12.5 mg) by mouth daily 45 tablet 3     rosuvastatin (CRESTOR) 5 MG tablet Take 1 tablet (5 mg) by mouth daily 90 tablet 3     SYNTHROID 100 MCG tablet Take 1 tablet (100 mcg) by mouth daily 90 tablet 3     losartan (COZAAR) 25 MG tablet Take 0.5 tablets (12.5 mg) by mouth daily 30 tablet 1     meclizine (ANTIVERT) 25 MG tablet Take 1 tablet (25 mg) by mouth 3 times daily as needed for dizziness 30 tablet 0     Allergies   Allergen Reactions     Lisinopril Other (See Comments)     lisinopril 5mg= severe vertigo     Metoprolol      Ringing in ears & extremities feeling cold       Mammogram Screening: Patient over age 50, mutual decision to screen reflected in health maintenance.    Pertinent mammograms are reviewed under the imaging tab.  History of abnormal Pap smear: NO - age 30-65 PAP every 5 years with negative HPV co-testing recommended  PAP / HPV Latest Ref Rng & Units 1/28/2020 12/7/2016 11/8/2013   PAP - NIL NIL NIL   HPV 16 DNA NEG:Negative Negative Negative -   HPV 18 DNA NEG:Negative Negative Negative -   OTHER HR HPV NEG:Negative Negative Negative -   HPV DNA - - - -     Reviewed and updated as needed this visit by clinical staff  Tobacco  Allergies  Meds  Med Hx  Surg Hx  Fam Hx  Soc Hx        Reviewed and updated as needed this visit by Provider        Past Medical History:   Diagnosis Date     Abdominal pain, epigastric 3/29/2007     Anxiety state, unspecified      ASCUS favor benign 2011    neg HPV     Hyperlipidemia LDL goal < 130     lipitor - fatigue, niaspan - some hot flashes. simvastatin = stomach upset' And pravastatin = muscle aches.        Hypertension goal BP (blood pressure) < 140/90     lisinopril 5mg= severe  "vertigo; metoprolol 25mg=ringing ears/cold extremities      Increased BMI 11/1/2017    Estimated body mass index is 31.53 kg/(m^2) as calculated from the following:   Height as of 10/2/17: 5' 3\" (1.6 m).   Weight as of 10/2/17: 178 lb (80.7 kg).      Low back ache     sees Dr. Dudley Lind - Chiropractor      Medication adverse effect 3/5/2014     Other forms of migraine, with intractable migraine, so stated, without mention of status migrainosus      Symptomatic menopausal or female climacteric states     bad hot flashes      Unspecified hypothyroidism       Past Surgical History:   Procedure Laterality Date     HC TOOTH EXTRACTION W/FORCEP      wisdom       Review of Systems  CONSTITUTIONAL:NEGATIVE for fever, chills, change in weight  INTEGUMENTARY/SKIN: NEGATIVE for worrisome rashes, moles or lesions  EYES: NEGATIVE for vision changes or irritation  ENT: POSITIVE for tinnitus bilateral and NEGATIVE for epistaxis and fever  has history of tinnitus and will come and go. Has been to ENT and audiology   Does have a history of sinus infections and will have pressure in her sinus more on the right cheek and periorbital area. When goes to [place] symptoms are gone   RESP:NEGATIVE for significant cough or SOB  BREAST: NEGATIVE for masses, tenderness or discharge  CV: NEGATIVE for chest pain, palpitations or peripheral edema and POSITIVE for HX HTNsaw cardiology in 2017 and had an echo   GI: NEGATIVE for nausea, abdominal pain, heartburn, or change in bowel habits   menopausal female: amenorrhea, no unusual urinary symptoms and no unusual vaginal symptoms  MUSCULOSKELETAL:NEGATIVE for significant arthralgias or myalgia  NEURO: POSITIVE for dizziness/lightheadedness a couple times a week  and NEGATIVE for involuntary movements, gait disturbance, loss of consciousness, syncope and weakness   ENDOCRINE: NEGATIVE for temperature intolerance, skin/hair changes and POSITIVE  for HX thyroid disease  has a history " "hypothyroidism and seems to feel better on the name brand.   HEME/ALLERGY/IMMUNE: POSITIVE  for allergies and NEGATIVE for anemia, night sweats and weight loss  PSYCHIATRIC: NEGATIVE for changes in mood or affect     OBJECTIVE:   BP (!) 140/75 (BP Location: Right arm, Patient Position: Sitting, Cuff Size: Adult Regular)   Pulse 100   Temp 96.7  F (35.9  C) (Temporal)   Ht 1.6 m (5' 3\")   Wt 82.6 kg (182 lb 3.2 oz)   LMP 05/15/2015   SpO2 99%   Breastfeeding No   BMI 32.28 kg/m     Wt Readings from Last 4 Encounters:   01/28/20 82.6 kg (182 lb 3.2 oz)   10/08/19 80.3 kg (177 lb)   09/19/19 80.4 kg (177 lb 3.2 oz)   12/17/18 83 kg (183 lb)       Physical Exam  GENERAL APPEARANCE: healthy, alert and no distress  EYES: Eyes grossly normal to inspection, PERRL and conjunctivae and sclerae normal  HENT: ear canals and TM's normal, nose and mouth without ulcers or lesions, oropharynx clear and oral mucous membranes moist  NECK: no adenopathy, no asymmetry, masses, or scars and thyroid normal to palpation  RESP: lungs clear to auscultation - no rales, rhonchi or wheezes  BREAST: normal without masses, tenderness or nipple discharge and no palpable axillary masses or adenopathy  CV: regular rates and rhythm, no murmur, click or rub, no peripheral edema and peripheral pulses strong  ABDOMEN: soft, nontender, no hepatosplenomegaly, no masses and bowel sounds normal   (female): normal female external genitalia, normal urethral meatus, vaginal mucosal atrophy noted, normal cervix, adnexae, and uterus without masses or abnormal discharge  MS: no musculoskeletal defects are noted and gait is age appropriate without ataxia  SKIN: no suspicious lesions or rashes  NEURO: Normal strength and tone, sensory exam grossly normal, mentation intact and speech normal  PSYCH: mentation appears normal and affect normal/bright    Diagnostic Test Results:  Labs reviewed in Epic  Results for orders placed or performed in visit on " 01/28/20   CT Sinus w/o Contrast     Status: None    Narrative    CT SINUS W/O CONTRAST 1/28/2020 2:50 PM    Provided History: Chronic congestion of paranasal sinus  ICD-10: Chronic congestion of paranasal sinus     Comparison: None available.     Technique:  Using thin collimation multidetector helical acquisition  technique, axial, coronal, and sagittal thin section CT images were  reconstructed through the paranasal sinuses. Images were reviewed in  bone and soft tissue windows.    Findings:   Maxillary sinuses: clear.  Sphenoid sinus: clear.  Frontal sinus: clear.  Ethmoid air cells: clear.    The ostiomeatal units appear patent bilaterally. The bony walls of the  paranasal sinuses are intact.    Normal retromaxillary and pterygopalatine fat.    The adenoid tonsils in the nasopharynx are unremarkable.      Impression    Impression:   No evidence of sinusitis.    GREG CHAVIRA MD   Results for orders placed or performed in visit on 01/28/20   Lipid panel reflex to direct LDL Fasting     Status: Abnormal   Result Value Ref Range    Cholesterol 191 <200 mg/dL    Triglycerides 175 (H) <150 mg/dL    HDL Cholesterol 57 >49 mg/dL    LDL Cholesterol Calculated 104 (H) <100 mg/dL    Non HDL Cholesterol 139 (H) <130 mg/dL   TSH     Status: None   Result Value Ref Range    TSH 1.69 0.40 - 4.00 mU/L   T4 free     Status: None   Result Value Ref Range    T4 Free 1.44 0.76 - 1.46 ng/dL   Albumin Random Urine Quantitative with Creat Ratio     Status: None   Result Value Ref Range    Creatinine Urine 164 mg/dL    Albumin Urine mg/L 29 mg/L    Albumin Urine mg/g Cr 17.56 0 - 25 mg/g Cr   CBC with platelets     Status: None   Result Value Ref Range    WBC 5.2 4.0 - 11.0 10e9/L    RBC Count 5.11 3.8 - 5.2 10e12/L    Hemoglobin 15.2 11.7 - 15.7 g/dL    Hematocrit 45.1 35.0 - 47.0 %    MCV 88 78 - 100 fl    MCH 29.7 26.5 - 33.0 pg    MCHC 33.7 31.5 - 36.5 g/dL    RDW 11.9 10.0 - 15.0 %    Platelet Count 183 150 - 450 10e9/L    Comprehensive metabolic panel     Status: Abnormal   Result Value Ref Range    Sodium 139 133 - 144 mmol/L    Potassium 4.1 3.4 - 5.3 mmol/L    Chloride 101 94 - 109 mmol/L    Carbon Dioxide 29 20 - 32 mmol/L    Anion Gap 9 3 - 14 mmol/L    Glucose 102 (H) 70 - 99 mg/dL    Urea Nitrogen 15 7 - 30 mg/dL    Creatinine 0.83 0.52 - 1.04 mg/dL    GFR Estimate 77 >60 mL/min/[1.73_m2]    GFR Estimate If Black 89 >60 mL/min/[1.73_m2]    Calcium 10.3 (H) 8.5 - 10.1 mg/dL    Bilirubin Total 0.5 0.2 - 1.3 mg/dL    Albumin 4.5 3.4 - 5.0 g/dL    Protein Total 8.6 6.8 - 8.8 g/dL    Alkaline Phosphatase 84 40 - 150 U/L    ALT 81 (H) 0 - 50 U/L    AST 55 (H) 0 - 45 U/L   Vitamin D Deficiency     Status: None   Result Value Ref Range    Vitamin D Deficiency screening 43 20 - 75 ug/L   [place] Resp Allergen Panel     Status: Abnormal   Result Value Ref Range    IGE 18 0 - 114 KIU/L    Allergen A alternata <0.10 <0.10 KU(A)/L    Allergen A fumigatus <0.10 <0.10 KU(A)/L    Allergen, [place] Grass <0.10 <0.10 KU(A)/L    Allergen, [place] <0.10 <0.10 KU(A)/L    Allergen Cat Dander <0.10 <0.10 KU(A)/L    Allergen C herbarum <0.10 <0.10 KU(A)/L    Allergen Cockroach <0.10 <0.10 KU(A)/L    Allergen [place] <0.10 <0.10 KU(A)/L    Allergen D farinae 4.45 (H) <0.10 KU(A)/L    Allergen, D Pteronyssinus 2.85 (H) <0.10 KU(A)/L    Allergen Dog Dander <0.10 <0.10 KU(A)/L    Allergen Elm <0.10 <0.10 KU(A)/L    Allergen Maple <0.10 <0.10 KU(A)/L    Allergen [place] <0.10 <0.10 KU(A)/L    Allergen, Mouse Urine <0.10 <0.10 KU(A)/L    Allergen, [place] <0.10 <0.10 KU(A)/L    Allergen Tree [place] IgE <0.10 <0.10 KU(A)/L    Allergen Weed [place] IgE <0.10 <0.10 KU(A)/L    Allergen [place](white) <0.10 <0.10 KU(A)/L    Allergen P notatum <0.10 <0.10 KU(A)/L    Allergen, Ragweed Short 0.33 (H) <0.10 KU(A)/L    Allergen, Russian Thistle <0.10 <0.10 KU(A)/L    Allergen [place] <0.10 <0.10 KU(A)/L    Allergen White [place] <0.10 <0.10 KU(A)/L    Allergen egg white IgE     Status: None   Result Value Ref Range    Allergen Egg White <0.10 <0.10 KU(A)/L   Allergen sesame seed IgE     Status: None   Result Value Ref Range    Allergen, Sesame Seed <0.10 <0.10 KU(A)/L   Allergen peanut IgE     Status: None   Result Value Ref Range    Allergen Peanut <0.10 <0.10 KU(A)/L   Allergen soybean IgE     Status: None   Result Value Ref Range    Allergen Soybean IgE <0.10 <0.10 KU(A)/L   Allergen hazelnut IgE     Status: None   Result Value Ref Range    Allergen, Hazelnut <0.10 <0.10 KU(A)/L   Allergen milk IgE     Status: None   Result Value Ref Range    Allergen Milk <0.10 <0.10 KU(A)/L   Allergen almonds IgE     Status: None   Result Value Ref Range    Allergen [place] <0.10 <0.10 KU(A)/L   Allergen cashew IgE     Status: None   Result Value Ref Range    Allergen Cashew <0.10 <0.10 KU(A)/L   Allergen shrimp IgE     Status: None   Result Value Ref Range    Allergen Shrimp <0.10 <0.10 KU(A)/L   Allergen walnuts IgE     Status: None   Result Value Ref Range    Allergen, [place] <0.10 <0.10 KU(A)/L   Allergen codfish IgE     Status: None   Result Value Ref Range    Allergen Fish(Cod) <0.10 <0.10 KU(A)/L   Allergen scallop IgE     Status: None   Result Value Ref Range    Allergen Scallop <0.10 <0.10 KU(A)/L   Allergen wheat IgE     Status: None   Result Value Ref Range    Allergen Wheat <0.10 <0.10 KU(A)/L   Allergen tuna IgE     Status: None   Result Value Ref Range    Allergen Tuna <0.10 <0.10 KU(A)/L   Allergen salmon IgE     Status: None   Result Value Ref Range    Allergen, [place] <0.10 <0.10 KU(A)/L   Pap imaged thin layer screen with HPV - recommended age 30 - 65 years (select HPV order below)     Status: None   Result Value Ref Range    PAP NIL     Copath Report         Patient Name: MARIBEL NEIL  MR#: [id number]  Specimen #: [id number]  Collected: 1/28/2020  Received: 1/29/2020  Reported: 1/31/2020 10:21  Ordering Phy(s): EDUARD CREWS    For improved  result formatting, select 'View Enhanced Report Format' under   Linked Documents section.    SPECIMEN/STAIN PROCESS:  Pap imaged thin layer prep screening (Surepath, FocalPoint with guided   screening)       Pap-Cyto x 1, HPV ordered x 1    SOURCE: Cervical, endocervical  ----------------------------------------------------------------   Pap imaged thin layer prep screening (Surepath, FocalPoint with guided   screening)  SPECIMEN ADEQUACY:  Satisfactory for evaluation.  -Transitional zone component could not be determined due to atrophy.    CYTOLOGIC INTERPRETATION:    Negative for intraepithelial lesion or malignancy    Electronically signed out by:  RITU Brantley (ASCP)    CLINICAL HISTORY:  LMP: 05/15/2015  Post Menopausal, A previous normal pap  Date of Last Pap: 12/07/2016,    Papanicol aou Test Limitations:  Cervical cytology is a screening test with   limited sensitivity; regular  screening is critical for cancer prevention; Pap tests are primarily   effective for the diagnosis/prevention of  squamous cell carcinoma, not adenocarcinomas or other cancers.    The technical component of this testing was completed at the [facility], with the professional component performed   at the [facility], [address] [place] [phone] ([phone])    COLLECTION SITE:  Client:  [facility]  Location: [facility] (B)       HPV High Risk Types DNA Cervical     Status: None   Result Value Ref Range    HPV Source SurePath     HPV 16 DNA Negative NEG^Negative    HPV 18 DNA Negative NEG^Negative    Other HR HPV Negative NEG^Negative    Final Diagnosis This patient's sample is negative for HPV DNA.     Specimen Description Cervical Cells        ASSESSMENT/PLAN:   June was seen today for physical.    Diagnoses and all orders for this visit:    Routine general medical  examination at a health care facility  -     Lipid panel reflex to direct LDL Fasting; Future  -     JUST IN CASE; Future  -     Comprehensive metabolic panel; Future  -     Vitamin D Deficiency    CARDIOVASCULAR SCREENING; LDL GOAL LESS THAN 160  -     Lipid panel reflex to direct LDL Fasting; Future    Screening for diabetes mellitus (DM)  -     JUST IN CASE; Future  -     Comprehensive metabolic panel; Future  -     JUST IN CASE  -     Comprehensive metabolic panel    Hypothyroidism, unspecified type  -     SYNTHROID 100 MCG tablet; Take 1 tablet (100 mcg) by mouth daily  -     TSH  -     T4 free    Essential hypertension with goal blood pressure less than 140/90- well controlled -new diagnosis 11/8/2013- side fx with lisinopril & metoprolol   -     amLODIPine (NORVASC) 10 MG tablet; Take 1 tablet (10 mg) by mouth daily  -     hydrochlorothiazide (HYDRODIURIL) 25 MG tablet; Take 0.5 tablets (12.5 mg) by mouth daily  -     Albumin Random Urine Quantitative with Creat Ratio  -     Comprehensive metabolic panel    Hyperlipidemia with target LDL less than 130- uncertain control - awaiting labs  -     rosuvastatin (CRESTOR) 5 MG tablet; Take 1 tablet (5 mg) by mouth daily  -     Lipid panel reflex to direct LDL Fasting    Chronic congestion of paranasal sinus  -     CT Sinus w/o Contrast; Future  -     Cancel: Allergy adult food panel  -     [place] Resp Allergen Panel  -     Allergen egg white IgE  -     Allergen sesame seed IgE  -     Allergen peanut IgE  -     Allergen soybean IgE  -     Allergen hazelnut IgE  -     Allergen milk IgE  -     Allergen almonds IgE  -     Allergen cashew IgE  -     Allergen shrimp IgE  -     Allergen walnuts IgE  -     Allergen codfish IgE  -     Allergen scallop IgE  -     Allergen wheat IgE  -     Allergen tuna IgE  -     Allergen salmon IgE    Screening for disorder of blood and blood-forming organs  -     CBC with platelets    Screening for malignant neoplasm of cervix  -     Pap  "imaged thin layer screen with HPV - recommended age 30 - 65 years (select HPV order below)  -     HPV High Risk Types DNA Cervical    Elevated LFTs  -     Comprehensive metabolic panel; Future    Serum calcium elevated  -     Comprehensive metabolic panel; Future  -     Parathyroid Hormone Intact; Future    PLAN:   Continue current medication regimen unchanged.   Patient needs to follow up in if no improvement,or sooner if worsening of symptoms or other symptoms develop.  FURTHER TESTING:       - CT sinus   Work on weight loss  Regular exercise  Will follow up and/or notify patient of  results via My Chart to determine further need for followup  Follow up office visit in one year for annual health maintenance exam, sooner PRN.      COUNSELING:  Reviewed preventive health counseling, as reflected in patient instructions  Special attention given to:        Regular exercise       Healthy diet/nutrition       Vision screening       Osteoporosis Prevention/Bone Health       Consider Hep C screening for patients born between 1945 and 1965       HIV screeninx in teen years, 1x in adult years, and at intervals if high risk       The 10-year ASCVD risk score (Cecilio CALLEJAS Jr., et al., 2013) is: 4.5%    Values used to calculate the score:      Age: 59 years      Sex: Female      Is Non- : No      Diabetic: No      Tobacco smoker: No      Systolic Blood Pressure: 140 mmHg      Is BP treated: Yes      HDL Cholesterol: 57 mg/dL      Total Cholesterol: 191 mg/dL    Estimated body mass index is 31.35 kg/m  as calculated from the following:    Height as of 10/8/19: 1.6 m (5' 3\").    Weight as of 10/8/19: 80.3 kg (177 lb).    Weight management plan: Discussed healthy diet and exercise guidelines     reports that she has never smoked. She has never used smokeless tobacco.      Counseling Resources:  ATP IV Guidelines  Pooled Cohorts Equation Calculator  Breast Cancer Risk Calculator  FRAX Risk Assessment  ICSI " Preventive Guidelines  Dietary Guidelines for Americans, 2010  USDA's MyPlate  ASA Prophylaxis  Lung CA Screening    MARGO Haney CNP  [facility]

## 2020-01-28 NOTE — NURSING NOTE
"Chief Complaint   Patient presents with     Physical     AFE       Initial BP (!) 140/75 (BP Location: Right arm, Patient Position: Sitting, Cuff Size: Adult Regular)   Pulse 100   Temp 96.7  F (35.9  C) (Temporal)   Ht 1.6 m (5' 3\")   Wt 82.6 kg (182 lb 3.2 oz)   LMP 05/15/2015   SpO2 99%   Breastfeeding No   BMI 32.28 kg/m   Estimated body mass index is 32.28 kg/m  as calculated from the following:    Height as of this encounter: 1.6 m (5' 3\").    Weight as of this encounter: 82.6 kg (182 lb 3.2 oz).  Medication Reconciliation: complete      LORENA Guzmán      "

## 2020-01-29 ENCOUNTER — TELEPHONE (OUTPATIENT)
Dept: PEDIATRICS | Facility: CLINIC | Age: 60
End: 2020-01-29

## 2020-01-29 LAB — DEPRECATED CALCIDIOL+CALCIFEROL SERPL-MC: 43 UG/L (ref 20–75)

## 2020-01-29 NOTE — RESULT ENCOUNTER NOTE
Mary Jo Nunn,    Attached are your test results.  CT of your sinuses are normal    Please contact us if you have any questions.    Vannesa Cyr, CNP

## 2020-01-29 NOTE — RESULT ENCOUNTER NOTE
Mary Jo Nunn,    Attached are your test results.  -Vitamin D level is normal and getting 1000 IU daily in your diet or supplements is recommended.    Please contact us if you have any questions.    Vannesa Cyr, CNP

## 2020-01-29 NOTE — TELEPHONE ENCOUNTER
Request from pharm. rx synthroid not covered, requesting generic. Message routed to provider for further review. Please advise. Deborah pineda lpn

## 2020-01-29 NOTE — RESULT ENCOUNTER NOTE
Mary Jo Nunn,    Attached are your test results.  -Normal red blood cell (hgb) levels, normal white blood cell count and normal platelet levels.  -Cholesterol levels are at your goal levels.  ADVISE: continuing your medication, a regular exercise program with at least 150 minutes of aerobic exercise per week, and eating a low saturated fat/low carbohydrate diet.  Also, you should recheck this fasting cholesterol panel in 12 months.  -Liver and gallbladder tests (ALT,AST, Alk phos,bilirubin) are significantly elevated. ADVISE: further testing - recheck not fasting within a month  -Kidney function (GFR) is normal.  -Sodium is normal.  -Potassium is normal.  -Calcium is elevated.  ADVISE: rechecking this in 1 month.  -Glucose is slight elevated and may be a sign of early diabetes (prediabetes). ADVISE:: eating a low carbohydrate diet, exercising, trying to lose weight (if necessary) and rechecking your glucose level in 12 months.  -TSH (thyroid stimulating hormone) level is normal which indicates appropriate thyroid replacement dosing.  ADVISE: continuing same replacement dose and rechecking this in 12 months.  -Microalbumin (urine protein) test is normal.  ADVISE: rechecking this annually.   Please contact us if you have any questions.    Vannesa Cyr, CNP

## 2020-01-29 NOTE — TELEPHONE ENCOUNTER
Can we do a PA as her thyroid level has been difficult for her to maintain regulated with the generic?

## 2020-01-31 LAB
A ALTERNATA IGE QN: <0.1 KU(A)/L
A FUMIGATUS IGE QN: <0.1 KU(A)/L
ALMOND IGE QN: <0.1 KU(A)/L
BERMUDA GRASS IGE QN: <0.1 KU(A)/L
C HERBARUM IGE QN: <0.1 KU(A)/L
CASHEW NUT IGE QN: <0.1 KU(A)/L
CAT DANDER IGG QN: <0.1 KU(A)/L
CEDAR IGE QN: <0.1 KU(A)/L
CODFISH IGE QN: <0.1 KU(A)/L
COMMON RAGWEED IGE QN: 0.33 KU(A)/L
COPATH REPORT: NORMAL
COTTONWOOD IGE QN: <0.1 KU(A)/L
COW MILK IGE QN: <0.1 KU(A)/L
D FARINAE IGE QN: 4.45 KU(A)/L
D PTERONYSS IGE QN: 2.85 KU(A)/L
DOG DANDER+EPITH IGE QN: <0.1 KU(A)/L
EGG WHITE IGE QN: <0.1 KU(A)/L
HAZELNUT IGE QN: <0.1 KU(A)/L
IGE SERPL-ACNC: 18 KIU/L (ref 0–114)
MAPLE IGE QN: <0.1 KU(A)/L
MARSH ELDER IGE QN: <0.1 KU(A)/L
MOUSE URINE PROT IGE QN: <0.1 KU(A)/L
NETTLE IGE QN: <0.1 KU(A)/L
P NOTATUM IGE QN: <0.1 KU(A)/L
PAP: NORMAL
PEANUT IGE QN: <0.1 KU(A)/L
ROACH IGE QN: <0.1 KU(A)/L
SALMON IGE QN: <0.1 KU(A)/L
SALTWORT IGE QN: <0.1 KU(A)/L
SCALLOP IGE QN: <0.1 KU(A)/L
SESAME SEED IGE QN: <0.1 KU(A)/L
SHRIMP IGE QN: <0.1 KU(A)/L
SILVER BIRCH IGE QN: <0.1 KU(A)/L
SOYBEAN IGE QN: <0.1 KU(A)/L
TIMOTHY IGE QN: <0.1 KU(A)/L
TUNA IGE QN: <0.1 KU(A)/L
WALNUT IGE QN: <0.1 KU(A)/L
WHEAT IGE QN: <0.1 KU(A)/L
WHITE ASH IGE QN: <0.1 KU(A)/L
WHITE ELM IGE QN: <0.1 KU(A)/L
WHITE MULBERRY IGE QN: <0.1 KU(A)/L
WHITE OAK IGE QN: <0.1 KU(A)/L

## 2020-01-31 NOTE — RESULT ENCOUNTER NOTE
Mary Jo Nunn,    Attached are your test results.  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    Please contact us if you have any questions.    Vannesa Cyr, CNP

## 2020-02-03 LAB
FINAL DIAGNOSIS: NORMAL
HPV HR 12 DNA CVX QL NAA+PROBE: NEGATIVE
HPV16 DNA SPEC QL NAA+PROBE: NEGATIVE
HPV18 DNA SPEC QL NAA+PROBE: NEGATIVE
SPECIMEN DESCRIPTION: NORMAL
SPECIMEN SOURCE CVX/VAG CYTO: NORMAL

## 2020-02-03 NOTE — TELEPHONE ENCOUNTER
Central Prior Authorization Team   [phone]    PA Initiation    Medication: SYNTHROID 100 MCG tablet   Insurance Company: [employer] - Phone [phone] Fax [phone]  Ph[place] the [place] [facility] [place] [address] [address] [place] [address]  Filling Pharmacy Phone: [phone]  Filling Pharmacy Fax: [phone]  Start Date: 2/3/2020    Initiate PA by phone at [phone]. Case # [id number]

## 2020-02-04 NOTE — TELEPHONE ENCOUNTER
Prior Authorization Approval    Authorization Effective Date: 1/4/2020  Authorization Expiration Date: 2/2/2021  Medication: SYNTHROID 100 MCG tablet-PA APPROVED   Approved Dose/Quantity:  Reference #: CASE ID # [id number]   Insurance Company: [employer] - Phone [phone] Fax [phone]  Expected CoPay:       CoPay Card Available:      Foundation Assistance Needed:    Which Pharmacy is filling the prescription (Not needed for infusion/clinic administered): [facility] [place] [address] [address] [place] [address]  Pharmacy Notified: Yes- **Instructed pharmacy to notify patient when script is ready to /ship.**  Patient Notified: Yes

## 2020-02-11 ENCOUNTER — ANCILLARY PROCEDURE (OUTPATIENT)
Dept: CARDIOLOGY | Facility: CLINIC | Age: 60
End: 2020-02-11
Attending: NURSE PRACTITIONER
Payer: COMMERCIAL

## 2020-02-11 ENCOUNTER — MYC MEDICAL ADVICE (OUTPATIENT)
Dept: PEDIATRICS | Facility: CLINIC | Age: 60
End: 2020-02-11

## 2020-02-11 DIAGNOSIS — N30.00 ACUTE CYSTITIS WITHOUT HEMATURIA: Primary | ICD-10-CM

## 2020-02-11 DIAGNOSIS — R82.90 ABNORMAL URINE ODOR: ICD-10-CM

## 2020-02-11 DIAGNOSIS — R42 DIZZINESS: ICD-10-CM

## 2020-02-11 DIAGNOSIS — R82.90 NONSPECIFIC FINDING ON EXAMINATION OF URINE: Primary | ICD-10-CM

## 2020-02-11 DIAGNOSIS — I10 ESSENTIAL HYPERTENSION WITH GOAL BLOOD PRESSURE LESS THAN 140/90: ICD-10-CM

## 2020-02-11 LAB
ALBUMIN UR-MCNC: 30 MG/DL
APPEARANCE UR: ABNORMAL
BACTERIA #/AREA URNS HPF: ABNORMAL /HPF
BILIRUB UR QL STRIP: NEGATIVE
COLOR UR AUTO: YELLOW
GLUCOSE UR STRIP-MCNC: NEGATIVE MG/DL
HGB UR QL STRIP: ABNORMAL
KETONES UR STRIP-MCNC: NEGATIVE MG/DL
LEUKOCYTE ESTERASE UR QL STRIP: ABNORMAL
MUCOUS THREADS #/AREA URNS LPF: PRESENT /LPF
NITRATE UR QL: NEGATIVE
NON-SQ EPI CELLS #/AREA URNS LPF: ABNORMAL /LPF
PH UR STRIP: 6.5 PH (ref 5–7)
RBC #/AREA URNS AUTO: ABNORMAL /HPF
SOURCE: ABNORMAL
SP GR UR STRIP: 1.02 (ref 1–1.03)
UROBILINOGEN UR STRIP-MCNC: 2 MG/DL (ref 0–2)
WBC #/AREA URNS AUTO: ABNORMAL /HPF

## 2020-02-11 PROCEDURE — 81001 URINALYSIS AUTO W/SCOPE: CPT | Performed by: NURSE PRACTITIONER

## 2020-02-11 PROCEDURE — 87088 URINE BACTERIA CULTURE: CPT | Performed by: NURSE PRACTITIONER

## 2020-02-11 PROCEDURE — 93784 AMBL BP MNTR W/SOFTWARE: CPT | Performed by: INTERNAL MEDICINE

## 2020-02-11 PROCEDURE — 87086 URINE CULTURE/COLONY COUNT: CPT | Performed by: NURSE PRACTITIONER

## 2020-02-11 PROCEDURE — 87186 SC STD MICRODIL/AGAR DIL: CPT | Performed by: NURSE PRACTITIONER

## 2020-02-11 RX ORDER — SULFAMETHOXAZOLE/TRIMETHOPRIM 800-160 MG
1 TABLET ORAL 2 TIMES DAILY
Qty: 14 TABLET | Refills: 0 | Status: SHIPPED | OUTPATIENT
Start: 2020-02-11 | End: 2020-02-18

## 2020-02-11 NOTE — PATIENT INSTRUCTIONS
Patient presents for 24hr ABPM placement ordered by MD. Patient was hooked up to the monitor and instructions were given regarding how long to wear the monitor, how often readings will be taken, how to place the sleeve for optimal results, when and where to return the unit and how results are provided.     Patient was provided with ABPM letter reiterating the above along with our hours for the return of the device.   Patient education was provided about cardiology interpretation and that provider will be notified of the results.    Diagnosis taken from MD order.

## 2020-02-11 NOTE — RESULT ENCOUNTER NOTE
Mary Jo Nunn,    Attached are your test results.  Urine looks like an infection will send in antibiotic and wait for culture results    Please contact us if you have any questions.    Vannesa Cyr, CNP

## 2020-02-12 ENCOUNTER — ANCILLARY PROCEDURE (OUTPATIENT)
Dept: MRI IMAGING | Facility: CLINIC | Age: 60
End: 2020-02-12
Attending: NURSE PRACTITIONER
Payer: COMMERCIAL

## 2020-02-12 DIAGNOSIS — H90.3 BILATERAL SENSORINEURAL HEARING LOSS: ICD-10-CM

## 2020-02-12 DIAGNOSIS — H91.90 HEARING DISORDER, UNSPECIFIED LATERALITY: ICD-10-CM

## 2020-02-12 DIAGNOSIS — N30.00 ACUTE CYSTITIS WITHOUT HEMATURIA: Primary | ICD-10-CM

## 2020-02-12 DIAGNOSIS — R42 DIZZINESS: ICD-10-CM

## 2020-02-12 PROCEDURE — 70544 MR ANGIOGRAPHY HEAD W/O DYE: CPT | Mod: TC

## 2020-02-12 RX ORDER — CEPHALEXIN 500 MG/1
500 CAPSULE ORAL 2 TIMES DAILY
Qty: 14 CAPSULE | Refills: 0 | Status: SHIPPED | OUTPATIENT
Start: 2020-02-12 | End: 2020-02-19

## 2020-02-12 NOTE — RESULT ENCOUNTER NOTE
Mary Jo Nunn,    Attached are your test results.  Good new is they do not see an aneurysm of occlusion of blood vessel   We need the help of ENT again now   Please make follow up appointment with ENT specialist to see what to do next    Please contact us if you have any questions.    Vannesa Cyr, CNP

## 2020-02-13 LAB
BACTERIA SPEC CULT: ABNORMAL
BACTERIA SPEC CULT: ABNORMAL
SPECIMEN SOURCE: ABNORMAL

## 2020-02-13 NOTE — RESULT ENCOUNTER NOTE
Mary Jo Nunn,    Attached are your test results.  -Urine culture is abnormal and grew out bacteria that are NOT sensitive to the antibiotic you have been given.   I have sent a new antibiotic to your pharmacy to replace the previous antibiotic.     Please contact us if you have any questions.    Vannesa Cyr, CNP

## 2020-02-23 ENCOUNTER — HEALTH MAINTENANCE LETTER (OUTPATIENT)
Age: 60
End: 2020-02-23

## 2020-08-03 ENCOUNTER — TELEPHONE (OUTPATIENT)
Dept: PEDIATRICS | Facility: CLINIC | Age: 60
End: 2020-08-03

## 2020-08-03 NOTE — TELEPHONE ENCOUNTER
ORDERS   Parathyroid and cbc    DATE ORDERED   1/2020    PT SUGGESTED TO F/U ON       Routing to provider for additional review. Is order still needed? What time frame recommended to complete?    PROVIDER ACTION   If needed, please route to pool for scheduling  If not needed, please cancel order.

## 2020-08-03 NOTE — LETTER
August 6, 2020      June Nunn  [address] [place] [facility] [address]              Dear June,    In order to ensure that we are providing the best quality care, we would like to remind you that you are due for a lab appointment for the following repeat labs that was recently ordered by Tiarra Cyr. Please let us know if you have any questions and we would be happy to help.     Thank you for trusting us with your care.    Sincerely,     J Kumar [facility] [facility]  [phone]

## 2020-11-05 ENCOUNTER — DOCUMENTATION ONLY (OUTPATIENT)
Dept: LAB | Facility: CLINIC | Age: 60
End: 2020-11-05

## 2020-11-05 DIAGNOSIS — Z13.6 CARDIOVASCULAR SCREENING; LDL GOAL LESS THAN 160: ICD-10-CM

## 2020-11-05 DIAGNOSIS — Z00.00 ROUTINE GENERAL MEDICAL EXAMINATION AT A HEALTH CARE FACILITY: ICD-10-CM

## 2020-11-05 DIAGNOSIS — E83.52 SERUM CALCIUM ELEVATED: ICD-10-CM

## 2020-11-05 DIAGNOSIS — R79.89 ELEVATED LFTS: ICD-10-CM

## 2020-11-05 LAB
ALBUMIN SERPL-MCNC: 4.7 G/DL (ref 3.4–5)
ALP SERPL-CCNC: 75 U/L (ref 40–150)
ALT SERPL W P-5'-P-CCNC: 47 U/L (ref 0–50)
ANION GAP SERPL CALCULATED.3IONS-SCNC: 3 MMOL/L (ref 3–14)
AST SERPL W P-5'-P-CCNC: 27 U/L (ref 0–45)
BILIRUB SERPL-MCNC: 0.6 MG/DL (ref 0.2–1.3)
BUN SERPL-MCNC: 23 MG/DL (ref 7–30)
CALCIUM SERPL-MCNC: 10.4 MG/DL (ref 8.5–10.1)
CHLORIDE SERPL-SCNC: 104 MMOL/L (ref 94–109)
CHOLEST SERPL-MCNC: 213 MG/DL
CO2 SERPL-SCNC: 30 MMOL/L (ref 20–32)
CREAT SERPL-MCNC: 0.78 MG/DL (ref 0.52–1.04)
GFR SERPL CREATININE-BSD FRML MDRD: 83 ML/MIN/{1.73_M2}
GLUCOSE SERPL-MCNC: 98 MG/DL (ref 70–99)
HDLC SERPL-MCNC: 65 MG/DL
LDLC SERPL CALC-MCNC: 119 MG/DL
NONHDLC SERPL-MCNC: 148 MG/DL
POTASSIUM SERPL-SCNC: 3.8 MMOL/L (ref 3.4–5.3)
PROT SERPL-MCNC: 8.6 G/DL (ref 6.8–8.8)
PTH-INTACT SERPL-MCNC: 41 PG/ML (ref 18–80)
SODIUM SERPL-SCNC: 137 MMOL/L (ref 133–144)
TRIGL SERPL-MCNC: 147 MG/DL

## 2020-11-05 PROCEDURE — 87340 HEPATITIS B SURFACE AG IA: CPT | Performed by: NURSE PRACTITIONER

## 2020-11-05 PROCEDURE — 86803 HEPATITIS C AB TEST: CPT | Performed by: NURSE PRACTITIONER

## 2020-11-05 PROCEDURE — 86704 HEP B CORE ANTIBODY TOTAL: CPT | Performed by: NURSE PRACTITIONER

## 2020-11-05 PROCEDURE — 84443 ASSAY THYROID STIM HORMONE: CPT | Performed by: NURSE PRACTITIONER

## 2020-11-05 PROCEDURE — 80053 COMPREHEN METABOLIC PANEL: CPT | Performed by: NURSE PRACTITIONER

## 2020-11-05 PROCEDURE — 86706 HEP B SURFACE ANTIBODY: CPT | Performed by: NURSE PRACTITIONER

## 2020-11-05 PROCEDURE — 36415 COLL VENOUS BLD VENIPUNCTURE: CPT | Performed by: NURSE PRACTITIONER

## 2020-11-05 PROCEDURE — 83970 ASSAY OF PARATHORMONE: CPT | Performed by: NURSE PRACTITIONER

## 2020-11-05 PROCEDURE — 84439 ASSAY OF FREE THYROXINE: CPT | Performed by: NURSE PRACTITIONER

## 2020-11-05 PROCEDURE — 80061 LIPID PANEL: CPT | Performed by: NURSE PRACTITIONER

## 2020-11-05 PROCEDURE — 86708 HEPATITIS A ANTIBODY: CPT | Performed by: NURSE PRACTITIONER

## 2020-11-05 NOTE — PROGRESS NOTES
Hi this patient came in for a bunch of labs today and was wondering if it's okay to have the fasting lipid done as well since she is already here and fasting. If not let me know please and I will cancel it and put it back into future. Thank you - Rebeca SCHMID

## 2020-11-06 ENCOUNTER — MYC MEDICAL ADVICE (OUTPATIENT)
Dept: PEDIATRICS | Facility: CLINIC | Age: 60
End: 2020-11-06
Payer: COMMERCIAL

## 2020-11-06 DIAGNOSIS — Z12.11 SCREEN FOR COLON CANCER: ICD-10-CM

## 2020-11-06 DIAGNOSIS — E03.9 HYPOTHYROIDISM, UNSPECIFIED TYPE: Primary | ICD-10-CM

## 2020-11-06 LAB
HAV IGG SER QL IA: NONREACTIVE
HBV CORE AB SERPL QL IA: NONREACTIVE
HBV SURFACE AB SERPL IA-ACNC: 0 M[IU]/ML
HBV SURFACE AG SERPL QL IA: NONREACTIVE
HCV AB SERPL QL IA: NONREACTIVE

## 2020-11-06 NOTE — RESULT ENCOUNTER NOTE
Mary Jo Nunn,    Attached are your test results.  -LDL(bad) cholesterol level is elevated which can increase your heart disease risk.  A diet high in fat and simple carbohydrates, genetics and being overweight can contribute to this. ADVISE: exercising 150 minutes of aerobic exercise per week (30 minutes for 5 days per week or 50 minutes for 3 days per week are options) and eating a low saturated fat/low carbohydrate diet are helpful to improve this. In 12 months, you should recheck your fasting cholesterol panel by scheduling a lab-only appointment.  -Liver and gallbladder tests (ALT,AST, Alk phos,bilirubin) are normal.  -Kidney function (GFR) is normal.  -Sodium is normal.  -Potassium is normal.  -Calcium slightly elevated. Are you taking a lot of calcium supplement   Parathyroid level is normal   -Glucose (diabetic screening test) is normal.   Please contact us if you have any questions.    Vannesa Cyr, CNP

## 2020-11-06 NOTE — RESULT ENCOUNTER NOTE
Mary Jo Nunn,    Attached are your test results.  -Hepatitis C antibody screen test shows no signs of a previous hepatitis C infection.   Please contact us if you have any questions.    Vannesa Cyr, CNP

## 2020-11-09 LAB
T4 FREE SERPL-MCNC: 1.26 NG/DL (ref 0.76–1.46)
TSH SERPL DL<=0.005 MIU/L-ACNC: 2.77 MU/L (ref 0.4–4)

## 2020-11-09 NOTE — RESULT ENCOUNTER NOTE
Mary Jo Nunn,    Attached are your test results.  Hepatitis screening is negative    Please contact us if you have any questions.    Vannesa Cyr, CNP

## 2020-11-09 NOTE — RESULT ENCOUNTER NOTE
Mary Jo Nunn,    Attached are your test results.  -TSH (thyroid stimulating hormone) level is normal which indicates appropriate thyroid replacement dosing.  ADVISE: continuing same replacement dose and rechecking this in 12 months.   Please contact us if you have any questions.    Vannesa Cyr, CNP

## 2020-11-13 ENCOUNTER — HOSPITAL ENCOUNTER (OUTPATIENT)
Facility: AMBULATORY SURGERY CENTER | Age: 60
End: 2020-11-13
Attending: FAMILY MEDICINE | Admitting: FAMILY MEDICINE
Payer: COMMERCIAL

## 2020-11-13 DIAGNOSIS — Z11.59 ENCOUNTER FOR SCREENING FOR OTHER VIRAL DISEASES: Primary | ICD-10-CM

## 2020-11-29 ENCOUNTER — HEALTH MAINTENANCE LETTER (OUTPATIENT)
Age: 60
End: 2020-11-29

## 2020-12-01 ENCOUNTER — TELEPHONE (OUTPATIENT)
Dept: SURGERY | Facility: CLINIC | Age: 60
End: 2020-12-01

## 2020-12-01 ENCOUNTER — OFFICE VISIT (OUTPATIENT)
Dept: AUDIOLOGY | Facility: CLINIC | Age: 60
End: 2020-12-01
Payer: COMMERCIAL

## 2020-12-01 ENCOUNTER — OFFICE VISIT (OUTPATIENT)
Dept: OTOLARYNGOLOGY | Facility: CLINIC | Age: 60
End: 2020-12-01
Attending: PHYSICIAN ASSISTANT
Payer: COMMERCIAL

## 2020-12-01 VITALS
DIASTOLIC BLOOD PRESSURE: 87 MMHG | HEART RATE: 108 BPM | RESPIRATION RATE: 20 BRPM | SYSTOLIC BLOOD PRESSURE: 157 MMHG | OXYGEN SATURATION: 98 %

## 2020-12-01 DIAGNOSIS — H81.01 MENIERE'S DISEASE, RIGHT: Primary | ICD-10-CM

## 2020-12-01 DIAGNOSIS — Z11.59 ENCOUNTER FOR SCREENING FOR OTHER VIRAL DISEASES: Primary | ICD-10-CM

## 2020-12-01 DIAGNOSIS — H90.3 SNHL (SENSORY-NEURAL HEARING LOSS), ASYMMETRICAL: Primary | ICD-10-CM

## 2020-12-01 PROCEDURE — 92550 TYMPANOMETRY & REFLEX THRESH: CPT | Mod: 52 | Performed by: AUDIOLOGIST

## 2020-12-01 PROCEDURE — 99214 OFFICE O/P EST MOD 30 MIN: CPT | Performed by: OTOLARYNGOLOGY

## 2020-12-01 PROCEDURE — 92557 COMPREHENSIVE HEARING TEST: CPT | Performed by: AUDIOLOGIST

## 2020-12-01 ASSESSMENT — ENCOUNTER SYMPTOMS
TREMORS: 0
VOMITING: 0
HEADACHES: 0
COUGH: 0
SINUS PAIN: 0
SPUTUM PRODUCTION: 0
NAUSEA: 0
DOUBLE VISION: 0
BLURRED VISION: 0
STRIDOR: 0
HEARTBURN: 0
SHORTNESS OF BREATH: 0
TINGLING: 0
DIZZINESS: 1
PHOTOPHOBIA: 0
HEMOPTYSIS: 0
BRUISES/BLEEDS EASILY: 0
SORE THROAT: 0
CONSTITUTIONAL NEGATIVE: 1

## 2020-12-01 ASSESSMENT — PAIN SCALES - GENERAL: PAINLEVEL: NO PAIN (0)

## 2020-12-01 NOTE — PROGRESS NOTES
AUDIOLOGY REPORT    SUMMARY: Audiology visit completed. See audiogram for results.    RECOMMENDATIONS: Follow-up with ENT.    Randa Meza  Doctor of Audiology  [place] License # [id number]

## 2020-12-01 NOTE — TELEPHONE ENCOUNTER
Location: [facility]  Is this a cancellation or reschedule?  no  The name of the procedure: Colonoscopy  Provider scheduled with: Teller  Date 12/21/2020, Time 11am

## 2020-12-01 NOTE — PROGRESS NOTES
HPI    This is a 60 year old patient who has been having vertigo episodes for the past several years. States aural pressure and tinnitus in her right ear. Describes spinning sensation with nausea, vomiting, and pressure. Lasts half an hour and goes away. States that travel and weather changes aggravate her aural pressure in her right ear. Denies any head trauma, head and neck surgery, acoustic trauma, long term antibiotics, radiation, or Migraine. She has a hx of hypothyroidism. She is on Meclizine as needed.  Hearing test: Stable from 2014. Slopint to severe SNHL left; Moderate SNHL right. Excellent recognition. Typms; WNLs. Reflexes are present.   MRI from 2014 MRA from 2020, CT of sinuses from 2020 are all WNLs.      Review of Systems   Constitutional: Negative.    HENT: Positive for hearing loss and tinnitus. Negative for congestion, ear discharge, ear pain, nosebleeds, sinus pain and sore throat.    Eyes: Negative for blurred vision, double vision and photophobia.   Respiratory: Negative for cough, hemoptysis, sputum production, shortness of breath and stridor.    Gastrointestinal: Negative for heartburn, nausea and vomiting.   Skin: Negative.    Neurological: Positive for dizziness. Negative for tingling, tremors and headaches.   Endo/Heme/Allergies: Negative for environmental allergies. Does not bruise/bleed easily.         Physical Exam  Vitals signs reviewed.   Constitutional:       Appearance: Normal appearance.   HENT:      Head: Normocephalic and atraumatic.      Right Ear: Tympanic membrane, ear canal and external ear normal. Decreased hearing noted. No middle ear effusion. There is no impacted cerumen.      Left Ear: Tympanic membrane, ear canal and external ear normal. Decreased hearing noted.  No middle ear effusion. There is no impacted cerumen.      Nose: Nose normal. No septal deviation, mucosal edema, congestion or rhinorrhea.      Mouth/Throat:      Mouth: Mucous membranes are moist.       Pharynx: Oropharynx is clear. Uvula midline.   Eyes:      Extraocular Movements: Extraocular movements intact.      Pupils: Pupils are equal, round, and reactive to light.   Neurological:      Mental Status: She is alert.     No Spontaneous nystagmus.    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 earing test: Stable from 2014. Slopint to severe SNHL left; Moderate SNHL right. Excellent recognition. Typms; WNLs. Reflexes are present.  MRI from 2014 MRA from 2020, CT of sinuses from 2020 are all WNLs. Options were reviewed. She will be scheduled for a comprehensive vestibular testing. We will talk about the options. Her questions were answered.

## 2020-12-01 NOTE — NURSING NOTE
"June Nunn's goals for this visit include:   Chief Complaint   Patient presents with     Ent Problem     Pt states that she feels pressure in her ears. R ears is worse than L. Has been going on \"off and on\" since 2014. But more recently the pressure has been getting worse. Pt does experience some dizziness when ears flare up.      She requests these members of her care team be copied on today's visit information:     PCP: Vannesa Cyr    Referring Provider:  Amparo Drummond PA-C  [facility]  [id number] [place] [id number]    BP (!) 157/87 (BP Location: Right arm, Patient Position: Sitting, Cuff Size: Adult Regular)   Pulse 108   Resp 20   LMP 05/15/2015   SpO2 98%     Do you need any medication refills at today's visit? No    Beth Hodge LPN      "

## 2020-12-01 NOTE — LETTER
12/1/2020         RE: June Nunn  [address] [facility] [address]        Dear Colleague,    Thank you for referring your patient, June Nunn, to the [facility]. Please see a copy of my visit note below.    HPI    This is a 60 year old patient who has been having vertigo episodes for the past several years. States aural pressure and tinnitus in her right ear. Describes spinning sensation with nausea, vomiting, and pressure. Lasts half an hour and goes away. States that travel and weather changes aggravate her aural pressure in her right ear. Denies any head trauma, head and neck surgery, acoustic trauma, long term antibiotics, radiation, or Migraine. She has a hx of hypothyroidism. She is on Meclizine as needed.  Hearing test: Stable from 2014. Slopint to severe SNHL left; Moderate SNHL right. Excellent recognition. Typms; WNLs. Reflexes are present.   MRI from 2014 MRA from 2020, CT of sinuses from 2020 are all WNLs.      Review of Systems   Constitutional: Negative.    HENT: Positive for hearing loss and tinnitus. Negative for congestion, ear discharge, ear pain, nosebleeds, sinus pain and sore throat.    Eyes: Negative for blurred vision, double vision and photophobia.   Respiratory: Negative for cough, hemoptysis, sputum production, shortness of breath and stridor.    Gastrointestinal: Negative for heartburn, nausea and vomiting.   Skin: Negative.    Neurological: Positive for dizziness. Negative for tingling, tremors and headaches.   Endo/Heme/Allergies: Negative for environmental allergies. Does not bruise/bleed easily.         Physical Exam  Vitals signs reviewed.   Constitutional:       Appearance: Normal appearance.   HENT:      Head: Normocephalic and atraumatic.      Right Ear: Tympanic membrane, ear canal and external ear normal. Decreased hearing noted. No middle ear effusion. There is no impacted cerumen.      Left Ear: Tympanic membrane, ear canal and  external ear normal. Decreased hearing noted.  No middle ear effusion. There is no impacted cerumen.      Nose: Nose normal. No septal deviation, mucosal edema, congestion or rhinorrhea.      Mouth/Throat:      Mouth: Mucous membranes are moist.      Pharynx: Oropharynx is clear. Uvula midline.   Eyes:      Extraocular Movements: Extraocular movements intact.      Pupils: Pupils are equal, round, and reactive to light.   Neurological:      Mental Status: She is alert.     No Spontaneous nystagmus.    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 earing test: Stable from 2014. Slopint to severe SNHL left; Moderate SNHL right. Excellent recognition. Typms; WNLs. Reflexes are present.  MRI from 2014 MRA from 2020, CT of sinuses from 2020 are all WNLs. Options were reviewed. She will be scheduled for a comprehensive vestibular testing. We will talk about the options. Her questions were answered.             Again, thank you for allowing me to participate in the care of your patient.        Sincerely,        Ced Keller MD

## 2020-12-07 ENCOUNTER — TELEPHONE (OUTPATIENT)
Dept: AUDIOLOGY | Facility: CLINIC | Age: 60
End: 2020-12-07

## 2020-12-14 RX ORDER — BISACODYL 5 MG/1
5 TABLET, DELAYED RELEASE ORAL SEE ADMIN INSTRUCTIONS
Qty: 1 TABLET | Refills: 0 | Status: SHIPPED | OUTPATIENT
Start: 2020-12-14 | End: 2021-01-19

## 2020-12-14 RX ORDER — SODIUM, POTASSIUM,MAG SULFATES 17.5-3.13G
1 SOLUTION, RECONSTITUTED, ORAL ORAL SEE ADMIN INSTRUCTIONS
Qty: 2 BOTTLE | Refills: 0 | Status: SHIPPED | OUTPATIENT
Start: 2020-12-14 | End: 2021-01-19

## 2020-12-14 ASSESSMENT — MIFFLIN-ST. JEOR: SCORE: 1310.24

## 2020-12-17 ENCOUNTER — OFFICE VISIT (OUTPATIENT)
Dept: AUDIOLOGY | Facility: CLINIC | Age: 60
End: 2020-12-17
Payer: COMMERCIAL

## 2020-12-17 DIAGNOSIS — H81.01 MENIERE'S DISEASE, RIGHT: ICD-10-CM

## 2020-12-17 DIAGNOSIS — R42 DIZZINESS AND GIDDINESS: Primary | ICD-10-CM

## 2020-12-17 PROCEDURE — 92542 POSITIONAL NYSTAGMUS TEST: CPT | Mod: 59 | Performed by: AUDIOLOGIST

## 2020-12-17 PROCEDURE — 92567 TYMPANOMETRY: CPT | Performed by: AUDIOLOGIST

## 2020-12-17 PROCEDURE — 92545 OSCILLATING TRACKING TEST: CPT | Mod: 59 | Performed by: AUDIOLOGIST

## 2020-12-17 PROCEDURE — 92537 CALORIC VSTBLR TEST W/REC: CPT | Performed by: AUDIOLOGIST

## 2020-12-17 PROCEDURE — 92541 SPONTANEOUS NYSTAGMUS TEST: CPT | Performed by: AUDIOLOGIST

## 2020-12-17 NOTE — PROGRESS NOTES
"AUDIOLOGY REPORT-BALANCE ASSESSMENT    SUBJECTIVE: June Nunn, 60 year old, was seen in Audiology at the [facility] on 12/17/2020, for videonystagmography (VNG) referred by Ced Keller M.D. Patient presents with chief complaints of vertigo, dizziness, nausea, emesis, right aural fullness and tinnitus.    Patient reports her aural fullness and tinnitus symptoms onset in 2014. She describes her right aural fullness as feeling like \"her ears will not release.\" Patient describes her tinnitus as sounding like an \"air pressure sound\" or \"Shhh\" which occurs in both ears, greater in her right ear. She notes increased difficulty hearing in her right ear during times of increased aural fullness. Patient reports her current right aural fullness began at the end of October 2020 and has since persisted. She reports that barometric changes, travel, and being on air planes provoke her aural fullness.     Patient reports symptoms of vertigo, dizziness, nausea and emesis onset in September 2019. She reports three episodes of vertigo in September 2019. She reports a fourth vertiginous episode in November 2020 following a salty meal. Patient describes her vertiginous episodes as onsetting spontaneously and lasting approximately 30 minutes in duration. Her vertigo is accompanied by significant nausea which results in emesis. She will then slowly feel \"back to normal\" after a half hour or so. Patient denies dizziness and vertigo between her episodes.     Patient reports her balance is good. She denies drifting to either side while walking. Patient's most recent hearing evaluation performed 12/1/2020 revealed normal sloping to severe sensorineural hearing loss in the left ear and a moderate sensorineural hearing loss in the right ear. She denies otalgia, otorrhea and history of ear surgeries. She reports significant history of ear infections as a child. She reports previous " history of sinus issues and migraines approximately 25+ years ago which ceased following giving birth to her son. Patient denies sensitivity to bright light and loud sounds. She denies history of head injures, concussions, cancer and chemotherapy. Patient denies double vision, blurred vision and history of eye surgeries. Patient denies tullio's phenomenon, hennebert's sign, and abnormal perception of bodily sounds (eyeblinks, ect.). Family history is positive for presbycusis and negative for migraines and vertigo. Patient denies consumption of caffeinated beverages, nicotine, alcoholic substances, or use of medications with known vestibular interactions within the past 48 hours.     OBJECTIVE:  Videonystagmography (VNG) testing:  Prescreening:  Tympanograms: Normal eardrum mobility bilaterally. Note: this test is completed to determine the status of the middle ear before irrigations are completed. *Patient denies dizziness with tympanometry.   Ocular range of motion and ocular counter roll: Normal  Cross/cover: Normal  Head Thrust: Negative     Nystagmus Tests:  Gaze-Horizontal with Fixation:   Center: Normal   Right: Normal   Left: Normal  Gaze-Vertical with Fixation:   Up: Normal   Down: Normal  Gaze with Fixation Denied  *Patient had mild difficulty maintaining appropriate gaze positions.   Center: Normal   Right: 2 degrees/s right beating nystagmus, likely endpoint\non-significant.   Left: Normal   Up: Normal  High Frequency Headshake:   Horizontal: Negative. No nystagmus or symptoms. Patient's left eye slightly adducts post headshake.   Vertical: Negative. 2 degrees/s left beating nystagmus, no symptoms.    Crocheron-Hallpike Head Right: Negative for PC BPPV. No nystagmus or symptoms.  Lucía-Hallpike Head Left: Negative for PC BPPV. No nystagmus or symptoms.  Roll Test Head Right: Negative for HC BPPV. No nystagmus or symptoms.  Roll Test Head Left: Negative for HC BPPV. No nystagmus or symptoms.    Positional  Testing:  Positionals: Supine: 1 degree/s right beating nystagmus, suppresses with fixation, no symptoms.  Positionals: Body Right: 2 degrees/s right beating nystagmus, suppresses with fixation, no symptoms.  Positionals: Body Left: 2 degrees/s left beating nystagmus, suppresses with fixation, no symptoms.  Positionals: Pre-Caloric: 1 degree/s right beating nystagmus, suppresses with fixation, no symptoms.    Oculomotor Tests:  Saccades: Normal  Anti-saccades: Normal, patient can complete task.  Pursuit: Normal    Calorics:  (Tested at 44 degrees and 30 degrees Celsius for 30 seconds for warm and cool water, respectively):  Right Warm Eye Speed: 12 degrees per second right beating  Left Warm Eye Speed: 15 degrees per second left beating  Right Cool Eye Speed: 13 degrees per second left beating  Left Cool Eye Speed: 19 degrees per second right beating  Difference between ear: 15% right hypofunction. (Greater than 25% considered clinically significant.)  Fixation Index: 0.07 Normal  Overall caloric test: Normal    ASSESSMENT:  1. There were no significant indications of central vestibular system involvement noted on today's exam.     2. There were no significant indications of peripheral vestibular system involvement noted on today's exam.     PLAN:  Follow-up with Dr. Ced Keller regarding today's results and for medical management. Please call this clinic at [phone] with questions regarding these results or recommendations.       Shawna Carr. CCC-A  Licensed Audiologist   [place] #[id number]

## 2020-12-18 DIAGNOSIS — Z11.59 ENCOUNTER FOR SCREENING FOR OTHER VIRAL DISEASES: ICD-10-CM

## 2020-12-18 PROCEDURE — U0003 INFECTIOUS AGENT DETECTION BY NUCLEIC ACID (DNA OR RNA); SEVERE ACUTE RESPIRATORY SYNDROME CORONAVIRUS 2 (SARS-COV-2) (CORONAVIRUS DISEASE [COVID-19]), AMPLIFIED PROBE TECHNIQUE, MAKING USE OF HIGH THROUGHPUT TECHNOLOGIES AS DESCRIBED BY CMS-2020-01-R: HCPCS | Performed by: SURGERY

## 2020-12-19 LAB
SARS-COV-2 RNA SPEC QL NAA+PROBE: NOT DETECTED
SPECIMEN SOURCE: NORMAL

## 2020-12-21 ENCOUNTER — HOSPITAL ENCOUNTER (OUTPATIENT)
Facility: AMBULATORY SURGERY CENTER | Age: 60
Discharge: HOME | End: 2020-12-21
Attending: SURGERY | Admitting: SURGERY
Payer: COMMERCIAL

## 2020-12-21 VITALS
HEIGHT: 63 IN | WEIGHT: 170 LBS | RESPIRATION RATE: 18 BRPM | TEMPERATURE: 96.8 F | SYSTOLIC BLOOD PRESSURE: 115 MMHG | DIASTOLIC BLOOD PRESSURE: 80 MMHG | BODY MASS INDEX: 30.12 KG/M2 | HEART RATE: 86 BPM | OXYGEN SATURATION: 97 %

## 2020-12-21 DIAGNOSIS — Z12.11 COLON CANCER SCREENING: Primary | ICD-10-CM

## 2020-12-21 LAB — COLONOSCOPY: NORMAL

## 2020-12-21 PROCEDURE — 99153 MOD SED SAME PHYS/QHP EA: CPT | Mod: 59 | Performed by: SURGERY

## 2020-12-21 PROCEDURE — 45385 COLONOSCOPY W/LESION REMOVAL: CPT

## 2020-12-21 PROCEDURE — 99152 MOD SED SAME PHYS/QHP 5/>YRS: CPT | Mod: 59 | Performed by: SURGERY

## 2020-12-21 PROCEDURE — G8907 PT DOC NO EVENTS ON DISCHARG: HCPCS

## 2020-12-21 PROCEDURE — 88305 TISSUE EXAM BY PATHOLOGIST: CPT | Performed by: PATHOLOGY

## 2020-12-21 PROCEDURE — G8918 PT W/O PREOP ORDER IV AB PRO: HCPCS

## 2020-12-21 PROCEDURE — 45385 COLONOSCOPY W/LESION REMOVAL: CPT | Mod: PT | Performed by: SURGERY

## 2020-12-21 RX ORDER — ONDANSETRON 4 MG/1
4 TABLET, ORALLY DISINTEGRATING ORAL EVERY 6 HOURS PRN
Status: DISCONTINUED | OUTPATIENT
Start: 2020-12-21 | End: 2020-12-22 | Stop reason: HOSPADM

## 2020-12-21 RX ORDER — NALOXONE HYDROCHLORIDE 0.4 MG/ML
0.4 INJECTION, SOLUTION INTRAMUSCULAR; INTRAVENOUS; SUBCUTANEOUS
Status: CANCELLED | OUTPATIENT
Start: 2020-12-21 | End: 2020-12-22

## 2020-12-21 RX ORDER — NALOXONE HYDROCHLORIDE 0.4 MG/ML
0.2 INJECTION, SOLUTION INTRAMUSCULAR; INTRAVENOUS; SUBCUTANEOUS
Status: DISCONTINUED | OUTPATIENT
Start: 2020-12-21 | End: 2020-12-22 | Stop reason: HOSPADM

## 2020-12-21 RX ORDER — LIDOCAINE 40 MG/G
CREAM TOPICAL
Status: DISCONTINUED | OUTPATIENT
Start: 2020-12-21 | End: 2020-12-22 | Stop reason: HOSPADM

## 2020-12-21 RX ORDER — ONDANSETRON 2 MG/ML
4 INJECTION INTRAMUSCULAR; INTRAVENOUS
Status: DISCONTINUED | OUTPATIENT
Start: 2020-12-21 | End: 2020-12-22 | Stop reason: HOSPADM

## 2020-12-21 RX ORDER — NALOXONE HYDROCHLORIDE 0.4 MG/ML
0.2 INJECTION, SOLUTION INTRAMUSCULAR; INTRAVENOUS; SUBCUTANEOUS
Status: CANCELLED | OUTPATIENT
Start: 2020-12-21 | End: 2020-12-22

## 2020-12-21 RX ORDER — FLUMAZENIL 0.1 MG/ML
0.2 INJECTION, SOLUTION INTRAVENOUS
Status: CANCELLED | OUTPATIENT
Start: 2020-12-21 | End: 2020-12-22

## 2020-12-21 RX ORDER — ONDANSETRON 2 MG/ML
4 INJECTION INTRAMUSCULAR; INTRAVENOUS EVERY 6 HOURS PRN
Status: CANCELLED | OUTPATIENT
Start: 2020-12-21

## 2020-12-21 RX ORDER — ONDANSETRON 4 MG/1
4 TABLET, ORALLY DISINTEGRATING ORAL EVERY 6 HOURS PRN
Status: CANCELLED | OUTPATIENT
Start: 2020-12-21

## 2020-12-21 RX ORDER — PROCHLORPERAZINE MALEATE 10 MG
10 TABLET ORAL EVERY 6 HOURS PRN
Status: CANCELLED | OUTPATIENT
Start: 2020-12-21

## 2020-12-21 RX ORDER — NALOXONE HYDROCHLORIDE 0.4 MG/ML
0.4 INJECTION, SOLUTION INTRAMUSCULAR; INTRAVENOUS; SUBCUTANEOUS
Status: DISCONTINUED | OUTPATIENT
Start: 2020-12-21 | End: 2020-12-22 | Stop reason: HOSPADM

## 2020-12-21 RX ORDER — PROCHLORPERAZINE MALEATE 10 MG
10 TABLET ORAL EVERY 6 HOURS PRN
Status: DISCONTINUED | OUTPATIENT
Start: 2020-12-21 | End: 2020-12-22 | Stop reason: HOSPADM

## 2020-12-21 RX ORDER — ONDANSETRON 2 MG/ML
4 INJECTION INTRAMUSCULAR; INTRAVENOUS EVERY 6 HOURS PRN
Status: DISCONTINUED | OUTPATIENT
Start: 2020-12-21 | End: 2020-12-22 | Stop reason: HOSPADM

## 2020-12-21 RX ORDER — FENTANYL CITRATE 50 UG/ML
INJECTION, SOLUTION INTRAMUSCULAR; INTRAVENOUS PRN
Status: DISCONTINUED | OUTPATIENT
Start: 2020-12-21 | End: 2020-12-21 | Stop reason: HOSPADM

## 2020-12-21 RX ORDER — FLUMAZENIL 0.1 MG/ML
0.2 INJECTION, SOLUTION INTRAVENOUS
Status: DISCONTINUED | OUTPATIENT
Start: 2020-12-21 | End: 2020-12-22 | Stop reason: HOSPADM

## 2020-12-23 LAB — COPATH REPORT: NORMAL

## 2021-01-15 ENCOUNTER — MYC MEDICAL ADVICE (OUTPATIENT)
Dept: FAMILY MEDICINE | Facility: CLINIC | Age: 61
End: 2021-01-15

## 2021-01-15 DIAGNOSIS — E03.9 HYPOTHYROIDISM, UNSPECIFIED TYPE: ICD-10-CM

## 2021-01-18 RX ORDER — LEVOTHYROXINE SODIUM 100 MCG
TABLET ORAL
Qty: 90 TABLET | Refills: 3 | Status: SHIPPED | OUTPATIENT
Start: 2021-01-18 | End: 2021-01-19

## 2021-01-18 NOTE — TELEPHONE ENCOUNTER
Prescription approved per [facility] Refill Protocol.    Lindsey Marquis RN, BSN, [facility]  [facility]

## 2021-01-19 ENCOUNTER — TELEPHONE (OUTPATIENT)
Dept: FAMILY MEDICINE | Facility: CLINIC | Age: 61
End: 2021-01-19

## 2021-01-19 DIAGNOSIS — E03.9 HYPOTHYROIDISM, UNSPECIFIED TYPE: ICD-10-CM

## 2021-01-19 RX ORDER — LEVOTHYROXINE SODIUM 100 UG/1
100 TABLET ORAL DAILY
Qty: 90 TABLET | Refills: 3 | Status: SHIPPED | OUTPATIENT
Start: 2021-01-19 | End: 2021-02-15

## 2021-01-19 NOTE — TELEPHONE ENCOUNTER
.Reason for call:  Other   Patient called regarding (reason for call): prescription  Additional comments: synthroid is too expensive. Pt would like to change to the generic prescription. Please call pharmacy @  [phone]    Phone number to reach patient:  Home number on file [phone] (home)    Best Time:  anytime    Can we leave a detailed message on this number?  YES    Travel screening: Negative

## 2021-02-12 DIAGNOSIS — Z12.31 VISIT FOR SCREENING MAMMOGRAM: ICD-10-CM

## 2021-02-12 PROCEDURE — 77067 SCR MAMMO BI INCL CAD: CPT | Mod: GC

## 2021-02-15 ENCOUNTER — OFFICE VISIT (OUTPATIENT)
Dept: FAMILY MEDICINE | Facility: CLINIC | Age: 61
End: 2021-02-15
Payer: COMMERCIAL

## 2021-02-15 VITALS
RESPIRATION RATE: 16 BRPM | WEIGHT: 189 LBS | OXYGEN SATURATION: 97 % | HEART RATE: 112 BPM | BODY MASS INDEX: 32.27 KG/M2 | TEMPERATURE: 96.7 F | SYSTOLIC BLOOD PRESSURE: 126 MMHG | DIASTOLIC BLOOD PRESSURE: 76 MMHG | HEIGHT: 64 IN

## 2021-02-15 DIAGNOSIS — E78.5 HYPERLIPIDEMIA WITH TARGET LDL LESS THAN 130: ICD-10-CM

## 2021-02-15 DIAGNOSIS — E03.9 HYPOTHYROIDISM, UNSPECIFIED TYPE: ICD-10-CM

## 2021-02-15 DIAGNOSIS — I10 ESSENTIAL HYPERTENSION WITH GOAL BLOOD PRESSURE LESS THAN 140/90: ICD-10-CM

## 2021-02-15 DIAGNOSIS — Z00.00 ROUTINE GENERAL MEDICAL EXAMINATION AT A HEALTH CARE FACILITY: Primary | ICD-10-CM

## 2021-02-15 DIAGNOSIS — Z80.3 FAMILY HISTORY OF MALIGNANT NEOPLASM OF BREAST: ICD-10-CM

## 2021-02-15 LAB
CREAT UR-MCNC: 277 MG/DL
MICROALBUMIN UR-MCNC: 51 MG/L
MICROALBUMIN/CREAT UR: 18.41 MG/G CR (ref 0–25)

## 2021-02-15 PROCEDURE — 99396 PREV VISIT EST AGE 40-64: CPT | Performed by: NURSE PRACTITIONER

## 2021-02-15 PROCEDURE — 82043 UR ALBUMIN QUANTITATIVE: CPT | Performed by: NURSE PRACTITIONER

## 2021-02-15 RX ORDER — HYDROCHLOROTHIAZIDE 25 MG/1
12.5 TABLET ORAL DAILY
Qty: 45 TABLET | Refills: 3 | Status: SHIPPED | OUTPATIENT
Start: 2021-02-15 | End: 2021-10-11

## 2021-02-15 RX ORDER — ROSUVASTATIN CALCIUM 5 MG/1
5 TABLET, COATED ORAL DAILY
Qty: 90 TABLET | Refills: 3 | Status: SHIPPED | OUTPATIENT
Start: 2021-02-15 | End: 2021-10-11

## 2021-02-15 RX ORDER — LEVOTHYROXINE SODIUM 100 UG/1
100 TABLET ORAL DAILY
Qty: 90 TABLET | Refills: 3 | Status: SHIPPED | OUTPATIENT
Start: 2021-02-15 | End: 2021-10-11

## 2021-02-15 RX ORDER — AMLODIPINE BESYLATE 10 MG/1
10 TABLET ORAL DAILY
Qty: 90 TABLET | Refills: 3 | Status: SHIPPED | OUTPATIENT
Start: 2021-02-15 | End: 2021-10-11

## 2021-02-15 ASSESSMENT — MIFFLIN-ST. JEOR: SCORE: 1404.36

## 2021-02-15 ASSESSMENT — PAIN SCALES - GENERAL: PAINLEVEL: NO PAIN (0)

## 2021-02-15 NOTE — RESULT ENCOUNTER NOTE
Mary Jo Nunn,    Attached are your test results.  -Microalbumin (urine protein) test is normal.  ADVISE: rechecking this annually.   Please contact us if you have any questions.    Vannesa Cyr, CNP

## 2021-02-15 NOTE — PROGRESS NOTES
SUBJECTIVE:   CC: June Nunn is an 60 year old woman who presents for preventive health visit.       Patient has been advised of split billing requirements and indicates understanding: Yes     Healthy Habits:    Do you get at least three servings of calcium containing foods daily (dairy, green leafy vegetables, etc.)? yes    Amount of exercise or daily activities, outside of work: 7 day(s) per week    Problems taking medications regularly No    Medication side effects: No    Have you had an eye exam in the past two years? yes    Do you see a dentist twice per year? yes    Do you have sleep apnea, excessive snoring or daytime drowsiness?no      Today's PHQ-2 Score:   PHQ-2 ( 1999 [employer]) 2/15/2021 1/28/2020   Q1: Little interest or pleasure in doing things 0 0   Q2: Feeling down, depressed or hopeless 0 0   PHQ-2 Score 0 0   Q1: Little interest or pleasure in doing things - -   Q2: Feeling down, depressed or hopeless - -   PHQ-2 Score - -       Abuse: Current or Past(Physical, Sexual or Emotional)- No  Do you feel safe in your environment? Yes      Social History     Tobacco Use     Smoking status: Never Smoker     Smokeless tobacco: Never Used   Substance Use Topics     Alcohol use: Yes     Alcohol/week: 1.7 standard drinks     Comment: occasional beer      If you drink alcohol do you typically have >3 drinks per day or >7 drinks per week? No                     Reviewed orders with patient.  Reviewed health maintenance and updated orders accordingly - Yes  Lab work is in process  Labs reviewed in EPIC  BP Readings from Last 3 Encounters:   02/15/21 126/76   12/21/20 115/80   12/01/20 (!) 157/87    Wt Readings from Last 3 Encounters:   02/15/21 85.7 kg (189 lb)   12/14/20 77.1 kg (170 lb)   01/28/20 82.6 kg (182 lb 3.2 oz)          Repeat Blood Pressure:  BP Pulse Site Cuff Size Time Date   (!) 147/86 112 Right arm Adult Large  8:39 AM 2/15/2021   126/76 --- Right arm ---  8:51 AM 2/15/2021     Peak Flow  Information  Peak Flow Resp Time Date   --- 16  8:39 AM 2/15/2021     Pain Information  Score Location Time Date   No Pain (0) ---  8:39 AM 2/15/2021   No orthostatic vitals data filed.            Patient Active Problem List   Diagnosis     Hypothyroidism, unspecified type     Simple goiter     Health Care Home     Living will, counseling/discussion     Heart murmur     Symptomatic menopausal or female climacteric states     Hyperlipidemia with target LDL less than 130     Essential hypertension with goal blood pressure less than 140/90     Elevated liver enzymes- very mild 2013     Bilateral sensorineural hearing loss - left > right     Non morbid obesity due to excess calories     ASCUS favor benign     Past Surgical History:   Procedure Laterality Date     COLONOSCOPY WITH CO2 INSUFFLATION N/A 2020    Procedure: COLONOSCOPY, WITH CO2 INSUFFLATION;  Surgeon: Ciro Graves DO;  Location: [facility] OR      TOOTH EXTRACTION W/FORCEP      wisdom       Social History     Tobacco Use     Smoking status: Never Smoker     Smokeless tobacco: Never Used   Substance Use Topics     Alcohol use: Yes     Alcohol/week: 1.7 standard drinks     Comment: occasional beer      Family History   Problem Relation Age of Onset     C.A.D. Father         MI at 71,  of prostate Cancer     Cancer Father         prostate cancer     Lipids Mother      Thyroid Disease Mother      Diabetes Mother         type 2 diabetes      Hypertension Mother      Hyperlipidemia Mother      Cerebrovascular Disease Mother 82        stroke from A fib      Hyperlipidemia Brother      Hypertension Brother      Breast Cancer Sister      Thyroid Disease Sister      Hyperlipidemia Sister      Breast Cancer Sister      Thyroid Disease Sister      Thyroid Disease Sister      Thyroid Disease Sister      Cancer - colorectal No family hx of          Current Outpatient Medications   Medication Sig Dispense Refill     amLODIPine (NORVASC) 10 MG tablet Take 1  tablet (10 mg) by mouth daily 90 tablet 3     hydrochlorothiazide (HYDRODIURIL) 25 MG tablet Take 0.5 tablets (12.5 mg) by mouth daily 45 tablet 3     levothyroxine (SYNTHROID) 100 MCG tablet Take 1 tablet (100 mcg) by mouth daily 90 tablet 3     rosuvastatin (CRESTOR) 5 MG tablet Take 1 tablet (5 mg) by mouth daily 90 tablet 3     Allergies   Allergen Reactions     Lisinopril Other (See Comments)     lisinopril 5mg= severe vertigo     Metoprolol      Ringing in ears & extremities feeling cold       Breast CA Risk Screening:  Any new diagnosis of family breast, ovarian, or bowel cancer? Yes     Breast CA Risk Assessment (FHS-7) 2/15/2021   Did any of your first-degree relatives have breast or ovarian cancer? Yes   Did any of your relatives have bilateral breast cancer? No   Did any man in your family have breast cancer? No   Did any woman in your family have breast and ovarian cancer? No   Did any woman in your family have breast cancer before age 50 y? Yes   Do you have 2 or more relatives with breast and/or ovarian cancer? Yes   Do you have 2 or more relatives with breast and/or bowel cancer? Yes       Pertinent mammograms are reviewed under the imaging tab.    Pertinent mammograms are reviewed under the imaging tab.  History of abnormal Pap smear: NO - age 30-65 PAP every 5 years with negative HPV co-testing recommended  PAP / HPV Latest Ref Rng & Units 1/28/2020 12/7/2016 11/8/2013   PAP - NIL NIL NIL   HPV 16 DNA NEG:Negative Negative Negative -   HPV 18 DNA NEG:Negative Negative Negative -   OTHER HR HPV NEG:Negative Negative Negative -   HPV DNA - - - -     Reviewed and updated as needed this visit by clinical staff  Tobacco  Allergies  Meds   Med Hx  Surg Hx  Fam Hx  Soc Hx        Reviewed and updated as needed this visit by Provider                Past Medical History:   Diagnosis Date     Abdominal pain, epigastric 3/29/2007     Anxiety state, unspecified      ASCUS favor benign 2011    neg HPV      "Hyperlipidemia LDL goal < 130     lipitor - fatigue, niaspan - some hot flashes. simvastatin = stomach upset' And pravastatin = muscle aches.        Hypertension goal BP (blood pressure) < 140/90     lisinopril 5mg= severe vertigo; metoprolol 25mg=ringing ears/cold extremities      Increased BMI 11/1/2017    Estimated body mass index is 31.53 kg/(m^2) as calculated from the following:   Height as of 10/2/17: 5' 3\" (1.6 m).   Weight as of 10/2/17: 178 lb (80.7 kg).      Low back ache     sees Dr. Dudley Lind - Chiropractor      Medication adverse effect 3/5/2014     Other forms of migraine, with intractable migraine, so stated, without mention of status migrainosus      Symptomatic menopausal or female climacteric states     bad hot flashes      Unspecified hypothyroidism       Past Surgical History:   Procedure Laterality Date     COLONOSCOPY WITH CO2 INSUFFLATION N/A 12/21/2020    Procedure: COLONOSCOPY, WITH CO2 INSUFFLATION;  Surgeon: Ciro Graves DO;  Location:  OR      TOOTH EXTRACTION W/FORCEP      wisdom       ROS:  CONSTITUTIONAL:NEGATIVE for fever, chills, change in weight  INTEGUMENTARY/SKIN: NEGATIVE for worrisome rashes, moles or lesions  EYES: NEGATIVE for vision changes or irritation  ENT: NEGATIVE for ear, mouth and throat problems  RESP:NEGATIVE for significant cough or SOB  BREAST: NEGATIVE for masses, tenderness or discharge  CV: NEGATIVE for chest pain, palpitations or peripheral edema  GI: NEGATIVE for nausea, abdominal pain, heartburn, or change in bowel habits   menopausal female: amenorrhea, no unusual urinary symptoms and no unusual vaginal symptoms  MUSCULOSKELETAL:NEGATIVE for significant arthralgias or myalgia  NEURO: NEGATIVE for weakness, dizziness or paresthesias  ENDOCRINE: NEGATIVE for temperature intolerance, skin/hair changes  HEME/ALLERGY/IMMUNE: NEGATIVE for bleeding problems  PSYCHIATRIC: NEGATIVE for changes in mood or affect       OBJECTIVE:   /76 (BP Location: " "Right arm, Patient Position: Sitting)   Pulse 112   Temp 96.7  F (35.9  C) (Tympanic)   Resp 16   Ht 1.613 m (5' 3.5\")   Wt 85.7 kg (189 lb)   LMP 05/15/2015   SpO2 97%   BMI 32.95 kg/m     Wt Readings from Last 4 Encounters:   02/15/21 85.7 kg (189 lb)   12/14/20 77.1 kg (170 lb)   01/28/20 82.6 kg (182 lb 3.2 oz)   10/08/19 80.3 kg (177 lb)       EXAM:  GENERAL APPEARANCE: healthy, alert, no distress and obese  EYES: Eyes grossly normal to inspection and conjunctivae and sclerae normal  HENT: ear canals and TM's normal, nose and mouth without ulcers or lesions, oropharynx clear and oral mucous membranes moist  NECK: no adenopathy, no asymmetry, masses, or scars and thyroid normal to palpation  RESP: lungs clear to auscultation - no rales, rhonchi or wheezes  BREAST: normal without masses, tenderness or nipple discharge and no palpable axillary masses or adenopathy  ABDOMEN: soft, nontender, no hepatosplenomegaly, no masses and bowel sounds normal   (female): normal female external genitalia, normal urethral meatus, vaginal mucosal atrophy noted, normal cervix, adnexae, and uterus without masses or abnormal discharge  MS: no musculoskeletal defects are noted and gait is age appropriate without ataxia  SKIN: no suspicious lesions or rashes  NEURO: Normal strength and tone, sensory exam grossly normal, mentation intact and speech normal  PSYCH: mentation appears normal and affect normal/bright    Diagnostic Test Results:  Labs reviewed in Epic  Results for orders placed or performed in visit on 02/15/21   Albumin Random Urine Quantitative with Creat Ratio     Status: None   Result Value Ref Range    Creatinine Urine 277 mg/dL    Albumin Urine mg/L 51 mg/L    Albumin Urine mg/g Cr 18.41 0 - 25 mg/g Cr       ASSESSMENT/PLAN:   June was seen today for physical.    Diagnoses and all orders for this visit:    Routine general medical examination at a health care facility    Hyperlipidemia with target LDL less " than 130  -     rosuvastatin (CRESTOR) 5 MG tablet; Take 1 tablet (5 mg) by mouth daily  The current medical regimen is effective.  Continue current medication regimen unchanged.    Essential hypertension with goal blood pressure less than 140/90  -     amLODIPine (NORVASC) 10 MG tablet; Take 1 tablet (10 mg) by mouth daily  -     hydrochlorothiazide (HYDRODIURIL) 25 MG tablet; Take 0.5 tablets (12.5 mg) by mouth daily  -     Albumin Random Urine Quantitative with Creat Ratio  The current medical regimen is effective.  Continue current medication regimen unchanged.    Hypothyroidism, unspecified type  -     levothyroxine (SYNTHROID) 100 MCG tablet; Take 1 tablet (100 mcg) by mouth daily    Hyperlipidemia with target LDL less than 130- uncertain control - awaiting labs  -     rosuvastatin (CRESTOR) 5 MG tablet; Take 1 tablet (5 mg) by mouth daily  The current medical regimen is effective.  Continue current medication regimen unchanged.    Family history of malignant neoplasm of breast  -     CANCER RISK MGMT/CANCER GENETIC COUNSELING REFERRAL      PLAN:   The current medical regimen is effective.  Continue current medication regimen unchanged.  Patient needs to follow up in if no improvement,or sooner if worsening of symptoms or other symptoms develop.  Work on weight loss  Regular exercise  Follow up office visit in one year for annual health maintenance exam, sooner PRN.    Patient has been advised of split billing requirements and indicates understanding: Yes  COUNSELING:   Reviewed preventive health counseling, as reflected in patient instructions  Special attention given to:        Regular exercise       Healthy diet/nutrition       Vision screening       Osteoporosis prevention/bone health       Colon cancer screening       Consider Hep C screening for all patients one time for ages 18-79 years       The 10-year ASCVD risk score (Cecilio CALLEJAS Jr., et al., 2013) is: 4%    Values used to calculate the score:      Age:  "60 years      Sex: Female      Is Non- : No      Diabetic: No      Tobacco smoker: No      Systolic Blood Pressure: 126 mmHg      Is BP treated: Yes      HDL Cholesterol: 65 mg/dL      Total Cholesterol: 213 mg/dL    Estimated body mass index is 32.95 kg/m  as calculated from the following:    Height as of this encounter: 1.613 m (5' 3.5\").    Weight as of this encounter: 85.7 kg (189 lb).    Weight management plan: Discussed healthy diet and exercise guidelines    She reports that she has never smoked. She has never used smokeless tobacco.      Counseling Resources:  ATP IV Guidelines  Pooled Cohorts Equation Calculator  Breast Cancer Risk Calculator  BRCA-Related Cancer Risk Assessment: FHS-7 Tool  FRAX Risk Assessment  ICSI Preventive Guidelines  Dietary Guidelines for Americans, 2010  USDA's MyPlate  ASA Prophylaxis  Lung CA Screening    MAR[facility]  "

## 2021-02-15 NOTE — PATIENT INSTRUCTIONS
PLAN:   1.   Symptomatic therapy suggested: Continue current medications as prescribed.   Increase calcium to 1000mg and 800iu Vit D  2.  Orders Placed This Encounter   Medications     amLODIPine (NORVASC) 10 MG tablet     Sig: Take 1 tablet (10 mg) by mouth daily     Dispense:  90 tablet     Refill:  3     hydrochlorothiazide (HYDRODIURIL) 25 MG tablet     Sig: Take 0.5 tablets (12.5 mg) by mouth daily     Dispense:  45 tablet     Refill:  3     levothyroxine (SYNTHROID) 100 MCG tablet     Sig: Take 1 tablet (100 mcg) by mouth daily     Dispense:  90 tablet     Refill:  3     rosuvastatin (CRESTOR) 5 MG tablet     Sig: Take 1 tablet (5 mg) by mouth daily     Dispense:  90 tablet     Refill:  3     Orders Placed This Encounter   Procedures     Albumin Random Urine Quantitative with Creat Ratio     CANCER RISK MGMT/CANCER GENETIC COUNSELING REFERRAL       3. Patient needs to follow up in if no improvement,or sooner if worsening of symptoms or other symptoms develop.  Work on weight loss  Regular exercise  Follow up office visit in one year for annual health maintenance exam, sooner PRN.    Preventive Health Recommendations  Female Ages 50 - 64    Yearly exam: See your health care provider every year in order to  o Review health changes.   o Discuss preventive care.    o Review your medicines if your doctor has prescribed any.      Get a Pap test every three years (unless you have an abnormal result and your provider advises testing more often).    If you get Pap tests with HPV test, you only need to test every 5 years, unless you have an abnormal result.     You do not need a Pap test if your uterus was removed (hysterectomy) and you have not had cancer.    You should be tested each year for STDs (sexually transmitted diseases) if you're at risk.     Have a mammogram every 1 to 2 years.    Have a colonoscopy at age 50, or have a yearly FIT test (stool test). These exams screen for colon cancer.      Have a  cholesterol test every 5 years, or more often if advised.    Have a diabetes test (fasting glucose) every three years. If you are at risk for diabetes, you should have this test more often.     If you are at risk for osteoporosis (brittle bone disease), think about having a bone density scan (DEXA).    Shots: Get a flu shot each year. Get a tetanus shot every 10 years.    Nutrition:     Eat at least 5 servings of fruits and vegetables each day.    Eat whole-grain bread, whole-wheat pasta and brown rice instead of white grains and rice.    Get adequate Calcium and Vitamin D.     Lifestyle    Exercise at least 150 minutes a week (30 minutes a day, 5 days a week). This will help you control your weight and prevent disease.    Limit alcohol to one drink per day.    No smoking.     Wear sunscreen to prevent skin cancer.     See your dentist every six months for an exam and cleaning.    See your eye doctor every 1 to 2 years.

## 2021-09-25 ENCOUNTER — HEALTH MAINTENANCE LETTER (OUTPATIENT)
Age: 61
End: 2021-09-25

## 2021-11-11 ENCOUNTER — LAB (OUTPATIENT)
Dept: LAB | Facility: CLINIC | Age: 61
End: 2021-11-11
Payer: COMMERCIAL

## 2021-11-11 DIAGNOSIS — E03.9 HYPOTHYROIDISM, UNSPECIFIED TYPE: ICD-10-CM

## 2021-11-11 DIAGNOSIS — E78.5 HYPERLIPIDEMIA WITH TARGET LDL LESS THAN 130: ICD-10-CM

## 2021-11-11 DIAGNOSIS — I10 ESSENTIAL HYPERTENSION WITH GOAL BLOOD PRESSURE LESS THAN 140/90: ICD-10-CM

## 2021-11-11 LAB
ALBUMIN SERPL-MCNC: 4.4 G/DL (ref 3.4–5)
ALP SERPL-CCNC: 92 U/L (ref 40–150)
ALT SERPL W P-5'-P-CCNC: 34 U/L (ref 0–50)
ANION GAP SERPL CALCULATED.3IONS-SCNC: 3 MMOL/L (ref 3–14)
AST SERPL W P-5'-P-CCNC: 25 U/L (ref 0–45)
BILIRUB SERPL-MCNC: 0.6 MG/DL (ref 0.2–1.3)
BUN SERPL-MCNC: 15 MG/DL (ref 7–30)
CALCIUM SERPL-MCNC: 9.7 MG/DL (ref 8.5–10.1)
CHLORIDE BLD-SCNC: 107 MMOL/L (ref 94–109)
CHOLEST SERPL-MCNC: 196 MG/DL
CO2 SERPL-SCNC: 29 MMOL/L (ref 20–32)
CREAT SERPL-MCNC: 0.88 MG/DL (ref 0.52–1.04)
CREAT UR-MCNC: 252 MG/DL
FASTING STATUS PATIENT QL REPORTED: YES
GFR SERPL CREATININE-BSD FRML MDRD: 71 ML/MIN/1.73M2
GLUCOSE BLD-MCNC: 102 MG/DL (ref 70–99)
HDLC SERPL-MCNC: 55 MG/DL
LDLC SERPL CALC-MCNC: 110 MG/DL
MICROALBUMIN UR-MCNC: 20 MG/L
MICROALBUMIN/CREAT UR: 7.94 MG/G CR (ref 0–25)
NONHDLC SERPL-MCNC: 141 MG/DL
POTASSIUM BLD-SCNC: 4.2 MMOL/L (ref 3.4–5.3)
PROT SERPL-MCNC: 8.1 G/DL (ref 6.8–8.8)
SODIUM SERPL-SCNC: 139 MMOL/L (ref 133–144)
T4 FREE SERPL-MCNC: 1.13 NG/DL (ref 0.76–1.46)
TRIGL SERPL-MCNC: 153 MG/DL
TSH SERPL DL<=0.005 MIU/L-ACNC: 3.54 MU/L (ref 0.4–4)

## 2021-11-11 PROCEDURE — 80061 LIPID PANEL: CPT

## 2021-11-11 PROCEDURE — 80053 COMPREHEN METABOLIC PANEL: CPT

## 2021-11-11 PROCEDURE — 82043 UR ALBUMIN QUANTITATIVE: CPT

## 2021-11-11 PROCEDURE — 84443 ASSAY THYROID STIM HORMONE: CPT

## 2021-11-11 PROCEDURE — 36415 COLL VENOUS BLD VENIPUNCTURE: CPT

## 2021-11-11 PROCEDURE — 84439 ASSAY OF FREE THYROXINE: CPT

## 2021-11-15 NOTE — RESULT ENCOUNTER NOTE
Mary Jo Nunn,    Attached are your test results.  -Cholesterol levels are at your goal levels.  ADVISE: continuing your medication, a regular exercise program with at least 150 minutes of aerobic exercise per week, and eating a low saturated fat/low carbohydrate diet.  Also, you should recheck this fasting cholesterol panel in 12 months.  -Liver and gallbladder tests (ALT,AST, Alk phos,bilirubin) are normal.  -Kidney function (GFR) is normal.  -Sodium is normal.  -Potassium is normal.  -Calcium is normal.  -Glucose is slight elevated and may be a sign of early diabetes (prediabetes). ADVISE:: eating a low carbohydrate diet, exercising, trying to lose weight (if necessary) and rechecking your glucose level in 12 months.  -TSH (thyroid stimulating hormone) level is normal which indicates appropriate thyroid replacement dosing.  ADVISE: continuing same replacement dose and rechecking this in 12 months.  -Microalbumin (urine protein) test is normal.  ADVISE: rechecking this annually.   Please contact us if you have any questions.    Vannesa Cyr, CNP

## 2022-04-11 ASSESSMENT — ENCOUNTER SYMPTOMS
WEAKNESS: 0
HEMATURIA: 0
NERVOUS/ANXIOUS: 0
MYALGIAS: 0
SORE THROAT: 0
HEMATOCHEZIA: 0
DYSURIA: 0
CHILLS: 0
FREQUENCY: 0
ARTHRALGIAS: 0
DIZZINESS: 1
JOINT SWELLING: 0
NAUSEA: 0
PALPITATIONS: 0
HEARTBURN: 0
HEADACHES: 0
DIARRHEA: 0
COUGH: 0
BREAST MASS: 0
ABDOMINAL PAIN: 0
SHORTNESS OF BREATH: 0
FEVER: 0
PARESTHESIAS: 0
EYE PAIN: 0
CONSTIPATION: 0

## 2022-04-12 ENCOUNTER — MYC MEDICAL ADVICE (OUTPATIENT)
Dept: FAMILY MEDICINE | Facility: CLINIC | Age: 62
End: 2022-04-12

## 2022-04-12 ENCOUNTER — PATIENT OUTREACH (OUTPATIENT)
Dept: ONCOLOGY | Facility: CLINIC | Age: 62
End: 2022-04-12

## 2022-04-12 ENCOUNTER — OFFICE VISIT (OUTPATIENT)
Dept: FAMILY MEDICINE | Facility: CLINIC | Age: 62
End: 2022-04-12
Payer: COMMERCIAL

## 2022-04-12 VITALS
HEART RATE: 111 BPM | SYSTOLIC BLOOD PRESSURE: 127 MMHG | DIASTOLIC BLOOD PRESSURE: 78 MMHG | RESPIRATION RATE: 20 BRPM | TEMPERATURE: 98.4 F | BODY MASS INDEX: 33.31 KG/M2 | WEIGHT: 188 LBS | OXYGEN SATURATION: 97 % | HEIGHT: 63 IN

## 2022-04-12 DIAGNOSIS — R42 DIZZINESS: ICD-10-CM

## 2022-04-12 DIAGNOSIS — E78.5 HYPERLIPIDEMIA WITH TARGET LDL LESS THAN 130: ICD-10-CM

## 2022-04-12 DIAGNOSIS — R79.89 ELEVATED LFTS: ICD-10-CM

## 2022-04-12 DIAGNOSIS — Z23 HIGH PRIORITY FOR 2019-NCOV VACCINE: ICD-10-CM

## 2022-04-12 DIAGNOSIS — E03.9 HYPOTHYROIDISM, UNSPECIFIED TYPE: ICD-10-CM

## 2022-04-12 DIAGNOSIS — M25.572 PAIN IN JOINT, ANKLE AND FOOT, LEFT: ICD-10-CM

## 2022-04-12 DIAGNOSIS — Z13.0 SCREENING FOR DISORDER OF BLOOD AND BLOOD-FORMING ORGANS: ICD-10-CM

## 2022-04-12 DIAGNOSIS — D22.9 NUMEROUS MOLES: ICD-10-CM

## 2022-04-12 DIAGNOSIS — Z00.00 ROUTINE GENERAL MEDICAL EXAMINATION AT A HEALTH CARE FACILITY: Primary | ICD-10-CM

## 2022-04-12 DIAGNOSIS — I10 ESSENTIAL HYPERTENSION WITH GOAL BLOOD PRESSURE LESS THAN 140/90: ICD-10-CM

## 2022-04-12 DIAGNOSIS — Z80.3 FAMILY HISTORY OF MALIGNANT NEOPLASM OF BREAST: ICD-10-CM

## 2022-04-12 PROCEDURE — 91305 COVID-19,PF,PFIZER (12+ YRS): CPT | Performed by: NURSE PRACTITIONER

## 2022-04-12 PROCEDURE — 99396 PREV VISIT EST AGE 40-64: CPT | Mod: 25 | Performed by: NURSE PRACTITIONER

## 2022-04-12 PROCEDURE — 99214 OFFICE O/P EST MOD 30 MIN: CPT | Mod: 25 | Performed by: NURSE PRACTITIONER

## 2022-04-12 PROCEDURE — 0054A COVID-19,PF,PFIZER (12+ YRS): CPT | Performed by: NURSE PRACTITIONER

## 2022-04-12 ASSESSMENT — PAIN SCALES - GENERAL: PAINLEVEL: NO PAIN (0)

## 2022-04-12 ASSESSMENT — ENCOUNTER SYMPTOMS
ARTHRALGIAS: 0
BREAST MASS: 0
FEVER: 0
FREQUENCY: 0
DYSURIA: 0
NERVOUS/ANXIOUS: 0
COUGH: 0
DIARRHEA: 0
ABDOMINAL PAIN: 0
CHILLS: 0
WEAKNESS: 0
EYE PAIN: 0
HEMATURIA: 0
PALPITATIONS: 0
HEARTBURN: 0
HEMATOCHEZIA: 0
HEADACHES: 0
JOINT SWELLING: 0
NAUSEA: 0
DIZZINESS: 1
MYALGIAS: 0
SHORTNESS OF BREATH: 0
PARESTHESIAS: 0
SORE THROAT: 0
CONSTIPATION: 0

## 2022-04-12 NOTE — TELEPHONE ENCOUNTER
Provider: Patient was just seen today.  Please confirm you are aware that this patient no longer takes this medication by removing it from her medication list.  Thank you. Li Solis R.N.

## 2022-04-12 NOTE — PROGRESS NOTES
SUBJECTIVE:   CC: June Nunn is an 61 year old woman who presents for preventive health visit.     Healthy Habits:     Getting at least 3 servings of Calcium per day:  Yes    Bi-annual eye exam:  Yes    Dental care twice a year:  Yes    Sleep apnea or symptoms of sleep apnea:  Daytime drowsiness    Diet:  Regular (no restrictions)    Frequency of exercise:  6-7 days/week    Duration of exercise:  45-60 minutes    Taking medications regularly:  Yes    Medication side effects:  Muscle aches and Lightheadedness    PHQ-2 Total Score: 0    Additional concerns today:  Yes    PROBLEMS TO ADD ON...  Hypertension Follow-up      Do you check your blood pressure regularly outside of the clinic? Yes     Are you following a low salt diet? Yes    Are your blood pressures ever more than 140 on the top number (systolic) OR more   than 90 on the bottom number (diastolic), for example 140/90? No  Did 24 hour BP check on 2/22/2020 and was normal even though when she is here her BP is elevated     Today's PHQ-2 Score:   PHQ-2 ( 1999 [employer]) 4/11/2022   Q1: Little interest or pleasure in doing things 0   Q2: Feeling down, depressed or hopeless 0   PHQ-2 Score 0   PHQ-2 Total Score (12-17 Years)- Positive if 3 or more points; Administer PHQ-A if positive -   Q1: Little interest or pleasure in doing things Not at all   Q2: Feeling down, depressed or hopeless Not at all   PHQ-2 Score 0       Abuse: Current or Past (Physical, Sexual or Emotional) - No  Do you feel safe in your environment? No        Social History     Tobacco Use     Smoking status: Never Smoker     Smokeless tobacco: Never Used   Substance Use Topics     Alcohol use: Yes     Comment: occasional beer          Alcohol Use 4/11/2022   Prescreen: >3 drinks/day or >7 drinks/week? No   Prescreen: >3 drinks/day or >7 drinks/week? -       Reviewed orders with patient.  Reviewed health maintenance and updated orders accordingly - Yes  Lab work is in process  Labs reviewed  in EPIC  BP Readings from Last 3 Encounters:   22 127/78   02/15/21 126/76   20 115/80    Wt Readings from Last 3 Encounters:   22 85.3 kg (188 lb)   02/15/21 85.7 kg (189 lb)   20 77.1 kg (170 lb)                  Patient Active Problem List   Diagnosis     Hypothyroidism, unspecified type     Simple goiter     Health Care Home     Living will, counseling/discussion     Heart murmur     Symptomatic menopausal or female climacteric states     Hyperlipidemia with target LDL less than 130     Essential hypertension with goal blood pressure less than 140/90     Elevated liver enzymes- very mild 2013     Bilateral sensorineural hearing loss - left > right     Non morbid obesity due to excess calories     ASCUS favor benign     Past Surgical History:   Procedure Laterality Date     COLONOSCOPY WITH CO2 INSUFFLATION N/A 2020    Procedure: COLONOSCOPY, WITH CO2 INSUFFLATION;  Surgeon: Ciro Graves DO;  Location: [facility]     HC TOOTH EXTRACTION W/FORCEP      wisdom       Social History     Tobacco Use     Smoking status: Never Smoker     Smokeless tobacco: Never Used   Substance Use Topics     Alcohol use: Yes     Comment: occasional beer      Family History   Problem Relation Age of Onset     C.A.D. Father         MI at 71,  of prostate Cancer     Cancer Father         prostate cancer     Lipids Mother      Thyroid Disease Mother      Diabetes Mother      Hypertension Mother      Hyperlipidemia Mother      Cerebrovascular Disease Mother 82     Hyperlipidemia Brother      Hypertension Brother      Breast Cancer Sister      Thyroid Disease Sister      Hyperlipidemia Sister      Breast Cancer Sister      Thyroid Disease Sister      Thyroid Disease Sister      Thyroid Disease Sister      Breast Cancer Sister      Cancer - colorectal No family hx of          Current Outpatient Medications   Medication Sig Dispense Refill     amLODIPine (NORVASC) 10 MG tablet Take 1 tablet (10 mg) by  mouth daily 90 tablet 1     levothyroxine (SYNTHROID) 100 MCG tablet Take 1 tablet (100 mcg) by mouth daily 90 tablet 1     rosuvastatin (CRESTOR) 5 MG tablet Take 1 tablet (5 mg) by mouth daily 90 tablet 1     hydrochlorothiazide (HYDRODIURIL) 25 MG tablet Take 0.5 tablets (12.5 mg) by mouth daily 45 tablet 1     Allergies   Allergen Reactions     Lisinopril Other (See Comments)     lisinopril 5mg= severe vertigo     Metoprolol      Ringing in ears & extremities feeling cold       Breast Cancer Screening:  Any new diagnosis of family breast, ovarian, or bowel cancer? No    FHS-7:   Breast CA Risk Assessment (FHS-7) 2/15/2021   Did any of your first-degree relatives have breast or ovarian cancer? Yes   Did any of your relatives have bilateral breast cancer? No   Did any man in your family have breast cancer? No   Did any woman in your family have breast and ovarian cancer? No   Did any woman in your family have breast cancer before age 50 y? Yes   Do you have 2 or more relatives with breast and/or ovarian cancer? Yes   Do you have 2 or more relatives with breast and/or bowel cancer? Yes       Mammogram Screening: Recommended mammography every 1-2 years with patient discussion and risk factor consideration  Pertinent mammograms are reviewed under the imaging tab.    History of abnormal Pap smear: NO - age 30-65 PAP every 5 years with negative HPV co-testing recommended  PAP / HPV Latest Ref Rng & Units 1/28/2020 12/7/2016 11/8/2013   PAP (Historical) - NIL NIL NIL   HPV16 NEG:Negative Negative Negative -   HPV18 NEG:Negative Negative Negative -   HRHPV NEG:Negative Negative Negative -     Reviewed and updated as needed this visit by clinical staff                    Reviewed and updated as needed this visit by Provider                   Past Medical History:   Diagnosis Date     Abdominal pain, epigastric 3/29/2007     Anxiety state, unspecified      ASCUS favor benign 2011    neg HPV     Hyperlipidemia LDL goal <  "130     lipitor - fatigue, niaspan - some hot flashes. simvastatin = stomach upset' And pravastatin = muscle aches.        Hypertension goal BP (blood pressure) < 140/90     lisinopril 5mg= severe vertigo; metoprolol 25mg=ringing ears/cold extremities      Increased BMI 11/1/2017    Estimated body mass index is 31.53 kg/(m^2) as calculated from the following:   Height as of 10/2/17: 5' 3\" (1.6 m).   Weight as of 10/2/17: 178 lb (80.7 kg).      Low back ache     sees Dr. Dudley Lind - Chiropractor      Medication adverse effect 3/5/2014     Other forms of migraine, with intractable migraine, so stated, without mention of status migrainosus      Symptomatic menopausal or female climacteric states     bad hot flashes      Unspecified hypothyroidism       Past Surgical History:   Procedure Laterality Date     COLONOSCOPY WITH CO2 INSUFFLATION N/A 12/21/2020    Procedure: COLONOSCOPY, WITH CO2 INSUFFLATION;  Surgeon: Ciro Graves DO;  Location: [facility]     HC TOOTH EXTRACTION W/FORCEP      wisdom       Review of Systems   Constitutional: Negative for chills and fever.   HENT: Positive for hearing loss. Negative for congestion, ear pain and sore throat.         Went to hearing place and has asymmetric hearing loss and has hearing loss   Eyes: Negative for pain and visual disturbance.   Respiratory: Negative for cough and shortness of breath.    Cardiovascular: Negative for chest pain, palpitations and peripheral edema.   Gastrointestinal: Negative for abdominal pain, constipation, diarrhea, heartburn, hematochezia and nausea.   Breasts:  Negative for tenderness, breast mass and discharge.   Genitourinary: Negative for dysuria, frequency, genital sores, hematuria, pelvic pain, urgency, vaginal bleeding and vaginal discharge.   Musculoskeletal: Negative for arthralgias, joint swelling and myalgias.        Slipped on wet area at [place] and fell and hurt left ankle and  Now is better but     Skin: Negative for " "rash.   Neurological: Positive for dizziness. Negative for weakness, headaches and paresthesias.        Will have vertigo and has seen ENT and diagnosed with vertigo     This all started in 2019 and will have spell   Had 3 spells in 2019, 2020 1 spell, 2021 had 5 spells   What happens it usually happens at night   But will have a BM, gets nauseated and vomits and then lays down and get cool and then she lies down and it goes away    Psychiatric/Behavioral: Negative for mood changes. The patient is not nervous/anxious.        OBJECTIVE:   /78   Pulse 111   Temp 98.4  F (36.9  C) (Tympanic)   Resp 20   Ht 1.588 m (5' 2.5\")   Wt 85.3 kg (188 lb)   LMP 05/15/2015   SpO2 97%   BMI 33.84 kg/m    Physical Exam  GENERAL APPEARANCE: healthy, alert and no distress  EYES: Eyes grossly normal to inspection, PERRL and conjunctivae and sclerae normal  HENT: ear canals and TM's normal, nose and mouth without ulcers or lesions, oropharynx clear and oral mucous membranes moist  NECK: no adenopathy, no asymmetry, masses, or scars and thyroid normal to palpation  RESP: lungs clear to auscultation - no rales, rhonchi or wheezes  BREAST: normal without masses, tenderness or nipple discharge and no palpable axillary masses or adenopathy  CV: regular rates and rhythm, no murmur, click or rub, no peripheral edema and peripheral pulses strong  ABDOMEN: soft, nontender, no hepatosplenomegaly, no masses and bowel sounds normal   (female): normal female external genitalia, normal urethral meatus, vaginal mucosal atrophy noted, normal cervix, adnexae, and uterus without masses or abnormal discharge  MS: no musculoskeletal defects are noted and gait is age appropriate without ataxia  Exam of the injured ankle reveals swelling and tenderness over the lateral malleolus. No tenderness over the medial aspect of the ankle. The fifth metatarsal is not tender. The ankle joint is intact without excessive opening on stressing.SKIN: no " suspicious lesions or rashes  NEURO: Normal strength and tone, sensory exam grossly normal, mentation intact and speech normal  PSYCH: mentation appears normal and affect normal/bright    Diagnostic Test Results:   Diagnostic Test Results:  Labs reviewed in Epic  Results for orders placed or performed in visit on 04/12/22   XR Ankle Left G/E 3 Views     Status: None    Narrative    ANKLE LEFT THREE OR MORE VIEWS   4/12/2022 2:59 PM     HISTORY:  Pain in joint, ankle and foot, left.    COMPARISON: None.      Impression    IMPRESSION: Soft tissue swelling about the ankle, particularly  laterally. There are a few tiny calcifications distal to the medial  and lateral malleoli, likely related to old trauma. There is no  evidence of an acute or subacute fracture and there is no talar dome  osteochondral lesion. Small calcaneal enthesophytes.    ANDREA EPPERSON MD         SYSTEM ID:  [id number]     Results for orders placed or performed in visit on 04/12/22   XR Ankle Left G/E 3 Views     Status: None    Narrative    ANKLE LEFT THREE OR MORE VIEWS   4/12/2022 2:59 PM     HISTORY:  Pain in joint, ankle and foot, left.    COMPARISON: None.      Impression    IMPRESSION: Soft tissue swelling about the ankle, particularly  laterally. There are a few tiny calcifications distal to the medial  and lateral malleoli, likely related to old trauma. There is no  evidence of an acute or subacute fracture and there is no talar dome  osteochondral lesion. Small calcaneal enthesophytes.    ANDREA EPPERSON MD         SYSTEM ID:  [id number]         ASSESSMENT/PLAN:   June was seen today for physical and imm/inj.    Diagnoses and all orders for this visit:    Routine general medical examination at a health care facility  -     REVIEW OF HEALTH MAINTENANCE PROTOCOL ORDERS    Essential hypertension with goal blood pressure less than 140/90  -     Comprehensive metabolic panel; Future  -     Albumin Random Urine Quantitative with Creat Ratio;  Future  HTN Plan:  1)  Medication: continue current medication regimen unchanged  2)  Dietary sodium restriction  3)  Regular aerobic exercise  4)  Recheck in 1 year, sooner should new symptoms or   problems arise.  5) See todays orders.    Patient Education: Reviewed risks of hypertension and principles of   treatment.      Hyperlipidemia with target LDL less than 130  -     Lipid panel reflex to direct LDL Fasting; Future  -     Comprehensive metabolic panel; Future  The current medical regimen is effective.  Continue current medication regimen unchanged.    Hypothyroidism, unspecified type  -     T4 free; Future  -     TSH; Future  -     Vitamin D Deficiency; Future  The current medical regimen is effective.  Continue current medication regimen unchanged.    Screening for disorder of blood and blood-forming organs  -     CBC with platelets; Future    Family history of malignant neoplasm of breast  -     Cancer Risk Mgmt/Cancer Genetic Counseling Referral; Future    Dizziness  -     Adult Neurology  Referral; Future  FOLLOW UP WITH SPECIALIST :Neurology    High priority for 2019-nCoV vaccine  -     COVID-19,PF,PFIZER (12+ Yrs GRAY LABEL)    Numerous moles  -     Adult Dermatology Referral; Future    Pain in joint, ankle and foot, left  -     XR Ankle Left G/E 3 Views; Future  Will follow up and/or notify patient of  results via My Chart to determine further need for followup      PLAN:   Patient needs to follow up in if no improvement,or sooner if worsening of symptoms or other symptoms develop.  CONSULTATION/REFERRAL to neurology and cancer genetic   Please call [phone] to make appointment  if you do not hear from referrals in the next few days.   Work on weight loss  Regular exercise  Will follow up and/or notify patient of  results via My Chart to determine further need for followup  Follow up office visit in one year for annual health maintenance exam, sooner PRN.    Patient has been advised of  "split billing requirements and indicates understanding: Yes    COUNSELING:  Reviewed preventive health counseling, as reflected in patient instructions  Special attention given to:        Regular exercise       Healthy diet/nutrition       Vision screening       Osteoporosis prevention/bone health       Colorectal Cancer Screening       Consider Hep C screening for all patients one time for ages 18-79 years       HIV screeninx in teen years, 1x in adult years, and at intervals if high risk       The 10-year ASCVD risk score (Cecilio CALLEJAS Jr., et al., 2013) is: 4.8%    Values used to calculate the score:      Age: 61 years      Sex: Female      Is Non- : No      Diabetic: No      Tobacco smoker: No      Systolic Blood Pressure: 127 mmHg      Is BP treated: Yes      HDL Cholesterol: 55 mg/dL      Total Cholesterol: 196 mg/dL       Advance Care Planning    Estimated body mass index is 33.84 kg/m  as calculated from the following:    Height as of this encounter: 1.588 m (5' 2.5\").    Weight as of this encounter: 85.3 kg (188 lb).    Weight management plan: Discussed healthy diet and exercise guidelines    She reports that she has never smoked. She has never used smokeless tobacco.      Counseling Resources:  ATP IV Guidelines  Pooled Cohorts Equation Calculator  Breast Cancer Risk Calculator  BRCA-Related Cancer Risk Assessment: FHS-7 Tool  FRAX Risk Assessment  ICSI Preventive Guidelines  Dietary Guidelines for Americans, 2010  USDA's MyPlate  ASA Prophylaxis  Lung CA Screening    MARGO Haney [facility]  "

## 2022-04-12 NOTE — PATIENT INSTRUCTIONS
PLAN:   1.   Symptomatic therapy suggested: Continue current medications as prescribed.   Increase calcium to 1000mg and 1000iu Vit D    2.  Orders Placed This Encounter   Procedures    REVIEW OF HEALTH MAINTENANCE PROTOCOL ORDERS    XR Ankle Left G/E 3 Views    COVID-19,PF,PFIZER (12+ Yrs GRAY LABEL)    Lipid panel reflex to direct LDL Fasting    Comprehensive metabolic panel    T4 free    TSH    CBC with platelets    Albumin Random Urine Quantitative with Creat Ratio    Vitamin D Deficiency    Cancer Risk Mgmt/Cancer Genetic Counseling Referral    Adult Neurology  Referral    Adult Dermatology Referral       3. Patient needs to follow up in if no improvement,or sooner if worsening of symptoms or other symptoms develop.  CONSULTATION/REFERRAL to neurology and cancer genetic   Please call [phone] to make appointment  if you do not hear from referrals in the next few days.   Work on weight loss  Regular exercise  Will follow up and/or notify patient of  results via My Chart to determine further need for followup  Follow up office visit in one year for annual health maintenance exam, sooner PRN.

## 2022-04-13 ENCOUNTER — TELEPHONE (OUTPATIENT)
Dept: FAMILY MEDICINE | Facility: CLINIC | Age: 62
End: 2022-04-13

## 2022-04-13 ENCOUNTER — ANCILLARY PROCEDURE (OUTPATIENT)
Dept: MAMMOGRAPHY | Facility: CLINIC | Age: 62
End: 2022-04-13
Attending: NURSE PRACTITIONER
Payer: COMMERCIAL

## 2022-04-13 DIAGNOSIS — Z12.31 VISIT FOR SCREENING MAMMOGRAM: ICD-10-CM

## 2022-04-13 PROCEDURE — 77063 BREAST TOMOSYNTHESIS BI: CPT | Mod: TC | Performed by: RADIOLOGY

## 2022-04-13 PROCEDURE — 77067 SCR MAMMO BI INCL CAD: CPT | Mod: TC | Performed by: RADIOLOGY

## 2022-04-20 ENCOUNTER — MYC MEDICAL ADVICE (OUTPATIENT)
Dept: FAMILY MEDICINE | Facility: CLINIC | Age: 62
End: 2022-04-20
Payer: COMMERCIAL

## 2022-04-20 DIAGNOSIS — E03.9 HYPOTHYROIDISM, UNSPECIFIED TYPE: ICD-10-CM

## 2022-04-20 DIAGNOSIS — E78.5 HYPERLIPIDEMIA WITH TARGET LDL LESS THAN 130: ICD-10-CM

## 2022-04-20 DIAGNOSIS — I10 ESSENTIAL HYPERTENSION WITH GOAL BLOOD PRESSURE LESS THAN 140/90: ICD-10-CM

## 2022-04-20 RX ORDER — LEVOTHYROXINE SODIUM 100 UG/1
100 TABLET ORAL DAILY
Qty: 90 TABLET | Refills: 1 | Status: CANCELLED | OUTPATIENT
Start: 2022-04-20

## 2022-04-20 NOTE — TELEPHONE ENCOUNTER
Routing to provider to review/advise if refill appropriate.   Labs are not current but has lab appointment scheduled tomorrow 4/21/22.    Janny Adamson RN, BSN  [facility]

## 2022-04-21 ENCOUNTER — LAB (OUTPATIENT)
Dept: LAB | Facility: CLINIC | Age: 62
End: 2022-04-21
Payer: COMMERCIAL

## 2022-04-21 DIAGNOSIS — E03.9 HYPOTHYROIDISM, UNSPECIFIED TYPE: ICD-10-CM

## 2022-04-21 DIAGNOSIS — E78.5 HYPERLIPIDEMIA WITH TARGET LDL LESS THAN 130: ICD-10-CM

## 2022-04-21 DIAGNOSIS — Z13.0 SCREENING FOR DISORDER OF BLOOD AND BLOOD-FORMING ORGANS: ICD-10-CM

## 2022-04-21 DIAGNOSIS — I10 ESSENTIAL HYPERTENSION WITH GOAL BLOOD PRESSURE LESS THAN 140/90: ICD-10-CM

## 2022-04-21 LAB
ALBUMIN SERPL-MCNC: 4 G/DL (ref 3.4–5)
ALP SERPL-CCNC: 84 U/L (ref 40–150)
ALT SERPL W P-5'-P-CCNC: 53 U/L (ref 0–50)
ANION GAP SERPL CALCULATED.3IONS-SCNC: 5 MMOL/L (ref 3–14)
AST SERPL W P-5'-P-CCNC: 32 U/L (ref 0–45)
BILIRUB SERPL-MCNC: 0.5 MG/DL (ref 0.2–1.3)
BUN SERPL-MCNC: 12 MG/DL (ref 7–30)
CALCIUM SERPL-MCNC: 9.8 MG/DL (ref 8.5–10.1)
CHLORIDE BLD-SCNC: 104 MMOL/L (ref 94–109)
CHOLEST SERPL-MCNC: 181 MG/DL
CO2 SERPL-SCNC: 29 MMOL/L (ref 20–32)
CREAT SERPL-MCNC: 0.79 MG/DL (ref 0.52–1.04)
CREAT UR-MCNC: 346 MG/DL
DEPRECATED CALCIDIOL+CALCIFEROL SERPL-MC: 84 UG/L (ref 20–75)
ERYTHROCYTE [DISTWIDTH] IN BLOOD BY AUTOMATED COUNT: 12.2 % (ref 10–15)
FASTING STATUS PATIENT QL REPORTED: YES
GFR SERPL CREATININE-BSD FRML MDRD: 85 ML/MIN/1.73M2
GLUCOSE BLD-MCNC: 104 MG/DL (ref 70–99)
HCT VFR BLD AUTO: 44 % (ref 35–47)
HDLC SERPL-MCNC: 64 MG/DL
HGB BLD-MCNC: 14.7 G/DL (ref 11.7–15.7)
LDLC SERPL CALC-MCNC: 88 MG/DL
MCH RBC QN AUTO: 29.5 PG (ref 26.5–33)
MCHC RBC AUTO-ENTMCNC: 33.4 G/DL (ref 31.5–36.5)
MCV RBC AUTO: 88 FL (ref 78–100)
MICROALBUMIN UR-MCNC: 41 MG/L
MICROALBUMIN/CREAT UR: 11.85 MG/G CR (ref 0–25)
NONHDLC SERPL-MCNC: 117 MG/DL
PLATELET # BLD AUTO: 288 10E3/UL (ref 150–450)
POTASSIUM BLD-SCNC: 4.2 MMOL/L (ref 3.4–5.3)
PROT SERPL-MCNC: 8.2 G/DL (ref 6.8–8.8)
RBC # BLD AUTO: 4.99 10E6/UL (ref 3.8–5.2)
SODIUM SERPL-SCNC: 138 MMOL/L (ref 133–144)
T4 FREE SERPL-MCNC: 1.22 NG/DL (ref 0.76–1.46)
TRIGL SERPL-MCNC: 146 MG/DL
TSH SERPL DL<=0.005 MIU/L-ACNC: 2.57 MU/L (ref 0.4–4)
WBC # BLD AUTO: 6.6 10E3/UL (ref 4–11)

## 2022-04-21 PROCEDURE — 84443 ASSAY THYROID STIM HORMONE: CPT

## 2022-04-21 PROCEDURE — 82306 VITAMIN D 25 HYDROXY: CPT

## 2022-04-21 PROCEDURE — 36415 COLL VENOUS BLD VENIPUNCTURE: CPT

## 2022-04-21 PROCEDURE — 80053 COMPREHEN METABOLIC PANEL: CPT

## 2022-04-21 PROCEDURE — 84439 ASSAY OF FREE THYROXINE: CPT

## 2022-04-21 PROCEDURE — 80061 LIPID PANEL: CPT

## 2022-04-21 PROCEDURE — 85027 COMPLETE CBC AUTOMATED: CPT

## 2022-04-21 PROCEDURE — 82043 UR ALBUMIN QUANTITATIVE: CPT

## 2022-04-21 NOTE — RESULT ENCOUNTER NOTE
Mary Jo Nunn,    Attached are your test results.  -Normal red blood cell (hgb) levels, normal white blood cell count and normal platelet levels.   Please contact us if you have any questions.    Vannesa Cyr, CNP

## 2022-04-22 RX ORDER — LEVOTHYROXINE SODIUM 100 UG/1
100 TABLET ORAL DAILY
Qty: 90 TABLET | Refills: 1 | Status: SHIPPED | OUTPATIENT
Start: 2022-04-22 | End: 2022-10-07

## 2022-04-22 RX ORDER — AMLODIPINE BESYLATE 10 MG/1
10 TABLET ORAL DAILY
Qty: 90 TABLET | Refills: 1 | Status: SHIPPED | OUTPATIENT
Start: 2022-04-22 | End: 2022-10-07

## 2022-04-22 RX ORDER — ROSUVASTATIN CALCIUM 5 MG/1
5 TABLET, COATED ORAL DAILY
Qty: 90 TABLET | Refills: 1 | Status: SHIPPED | OUTPATIENT
Start: 2022-04-22 | End: 2022-10-07

## 2022-04-22 NOTE — TELEPHONE ENCOUNTER
Patient calling again to check on the status of her prescriptions. Patient is completely out of her medication and would like this sent ASAP.  Spoke to Dr. Grossman regarding the patients request. Her TSH is in normal range:   TSH 0.40 - 4.00 mU/L 2.57      Per Dr. Chauncey barakat to send per protocol for patient.    Eleni Ortiz RN [facility]

## 2022-04-22 NOTE — RESULT ENCOUNTER NOTE
Mary Jo Nunn,    Attached are your test results.  -Cholesterol levels are at your goal levels.  ADVISE: continuing your medication, a regular exercise program with at least 150 minutes of aerobic exercise per week, and eating a low saturated fat/low carbohydrate diet.  Also, you should recheck this fasting cholesterol panel in 12 months.  -Liver and gallbladder tests (ALT,AST, Alk phos,bilirubin) very slight elevation   Would just recheck not fasting in the next months  -Kidney function (GFR) is normal.  -Sodium is normal.  -Potassium is normal.  -Calcium is normal.  -Glucose is slight elevated and may be a sign of early diabetes (prediabetes). ADVISE:: eating a low carbohydrate diet, exercising, trying to lose weight (if necessary) and rechecking your glucose level in 12 months.  -TSH (thyroid stimulating hormone) level is normal which indicates appropriate thyroid replacement dosing.  ADVISE: continuing same replacement dose and rechecking this in 12 months.  -Vitamin D level is above normal and getting 1158-8042 IU daily in your diet or supplements is recommended.   So would decrease your daily dose   -Microalbumin (urine protein) test is normal.  ADVISE: rechecking this annually.     Please contact us if you have any questions.    Vannesa Cyr, CNP

## 2022-04-22 NOTE — TELEPHONE ENCOUNTER
Pt requesting refill. See labs done yesterday, 4/21/22.    Janny Adamson RN, BSN  [facility]

## 2022-07-06 ENCOUNTER — OFFICE VISIT (OUTPATIENT)
Dept: DERMATOLOGY | Facility: CLINIC | Age: 62
End: 2022-07-06
Attending: NURSE PRACTITIONER
Payer: COMMERCIAL

## 2022-07-06 VITALS
OXYGEN SATURATION: 99 % | TEMPERATURE: 98.9 F | DIASTOLIC BLOOD PRESSURE: 86 MMHG | SYSTOLIC BLOOD PRESSURE: 146 MMHG | HEART RATE: 106 BPM

## 2022-07-06 DIAGNOSIS — D18.01 CHERRY ANGIOMA: ICD-10-CM

## 2022-07-06 DIAGNOSIS — L82.1 SEBORRHEIC KERATOSES: ICD-10-CM

## 2022-07-06 DIAGNOSIS — L81.4 SOLAR LENTIGO: ICD-10-CM

## 2022-07-06 DIAGNOSIS — D23.9 DERMATOFIBROMA: ICD-10-CM

## 2022-07-06 DIAGNOSIS — D22.9 MULTIPLE BENIGN NEVI: Primary | ICD-10-CM

## 2022-07-06 PROCEDURE — 99203 OFFICE O/P NEW LOW 30 MIN: CPT | Performed by: DERMATOLOGY

## 2022-07-06 NOTE — NURSING NOTE
June Nunn's goals for this visit include:   Chief Complaint   Patient presents with     Skin Check     Full body skin check. No spots of concern. Last skin check at facility in [place] several years ago. No personal or family history of skin cancer.        She requests these members of her care team be copied on today's visit information:     PCP: Roderick Johnson    Referring Provider:  Vannesa Cyr, APRN CNP  [address] [place] [address]    BP (!) 146/86 (BP Location: Right arm, Patient Position: Sitting, Cuff Size: Adult Large)   Pulse 106   Temp 98.9  F (37.2  C) (Oral)   LMP 05/15/2015   SpO2 99%     Do you need any medication refills at today's visit? No  Kaylin Espana [facility]

## 2022-07-06 NOTE — LETTER
7/6/2022         RE: June Nunn  [address]  [place] [address]        Dear Colleague,    Thank you for referring your patient, June Nunn, to the [facility]. Please see a copy of my visit note below.    [facility] Dermatology Note  Encounter Date: Jul 6, 2022  Office Visit     Dermatology Problem List:  1. None.    ____________________________________________    Assessment & Plan:    1. Solar lentigines  - Recommend sunscreens SPF #30 or greater, protective clothing and avoidance of tanning beds.    2. Multiple clinically banal-appearing melanocytic nevi: Chronic, stable  - No further intervention required. Patient to report changes.   - Patient reassured of the benign nature of these lesions.    3. Benign findings including: seborrheic keratoses, cherry angioma, dermatofibroma: minor, self limited problems.  - No further intervention required. Patient to report changes.   - Patient reassured of the benign nature of these lesions.      Procedures Performed:   None.    Follow-up: 1-2 year in person, earlier for new or changing lesions    Staff and Scribe:     Scribe Disclosure:   I, Andre Cueva, am serving as a scribe to document services personally performed by this physician, Dr. Roderick Johnson, based on data collection and the provider's statements to me.     Provider Disclosure:   The documentation recorded by the scribe accurately reflects the services I personally performed and the decisions made by me.    Roderick Johnson MD   of Dermatology  Department of Dermatology  [facility] [employer]      ____________________________________________    CC: Skin Check (Full body skin check. No spots of concern. Last skin check at facility in [place] several years ago. No personal or family history of skin cancer. )    HPI:  Ms. June Nunn is a(n) 62 year old female who presents today as a new  patient for a skin check.    Referred to derm by Vannesa Cyr CNP for numerous moles. Note from 4/12/22 reviewed. A&P and exam unremarkable.    Today, she has no areas of concern.    Patient is otherwise feeling well, without additional skin concerns.    Labs Reviewed:  N/A    Physical Exam:  Vitals: LMP 05/15/2015   SKIN: Total skin excluding the undergarment areas was performed. The exam included the head/face, neck, both arms, chest, back, abdomen, both legs, digits and/or nails.   - There are dome shaped bright red papules on the trunk and extremities.   - Multiple regular brown pigmented macules and papules are identified on the trunk and extremities.   - There are waxy stuck on tan to brown papules on the trunk and extremities.   - Scattered brown macules on sun exposed areas.  - There is a firm tan/flesh colored papule that dimples with lateral pressure on the left thigh.   - No other lesions of concern on areas examined.     Medications:  Current Outpatient Medications   Medication     amLODIPine (NORVASC) 10 MG tablet     levothyroxine (SYNTHROID) 100 MCG tablet     rosuvastatin (CRESTOR) 5 MG tablet     No current facility-administered medications for this visit.      Past Medical History:   Patient Active Problem List   Diagnosis     Hypothyroidism, unspecified type     Simple goiter     Health Care Home     Living will, counseling/discussion     Heart murmur     Symptomatic menopausal or female climacteric states     Hyperlipidemia with target LDL less than 130     Essential hypertension with goal blood pressure less than 140/90     Elevated liver enzymes- very mild 11/8/2013     Bilateral sensorineural hearing loss - left > right     Non morbid obesity due to excess calories     ASCUS favor benign     Past Medical History:   Diagnosis Date     Abdominal pain, epigastric 3/29/2007     Anxiety state, unspecified      ASCUS favor benign 2011    neg HPV     Hyperlipidemia LDL goal < 130     lipitor -  "fatigue, niaspan - some hot flashes. simvastatin = stomach upset' And pravastatin = muscle aches.        Hypertension goal BP (blood pressure) < 140/90     lisinopril 5mg= severe vertigo; metoprolol 25mg=ringing ears/cold extremities      Increased BMI 11/1/2017    Estimated body mass index is 31.53 kg/(m^2) as calculated from the following:   Height as of 10/2/17: 5' 3\" (1.6 m).   Weight as of 10/2/17: 178 lb (80.7 kg).      Low back ache     sees Dr. Dudley Lind - Chiropractor      Medication adverse effect 3/5/2014     Other forms of migraine, with intractable migraine, so stated, without mention of status migrainosus      Symptomatic menopausal or female climacteric states     bad hot flashes      Unspecified hypothyroidism         CC Vannesa Cyr, APRN CNP  [address] [place] [address] on close of this encounter.      Again, thank you for allowing me to participate in the care of your patient.        Sincerely,        Roderick Johnson MD    "

## 2022-07-06 NOTE — PROGRESS NOTES
[facility] Dermatology Note  Encounter Date: Jul 6, 2022  Office Visit     Dermatology Problem List:  1. None.    ____________________________________________    Assessment & Plan:    1. Solar lentigines  - Recommend sunscreens SPF #30 or greater, protective clothing and avoidance of tanning beds.    2. Multiple clinically banal-appearing melanocytic nevi: Chronic, stable  - No further intervention required. Patient to report changes.   - Patient reassured of the benign nature of these lesions.    3. Benign findings including: seborrheic keratoses, cherry angioma, dermatofibroma: minor, self limited problems.  - No further intervention required. Patient to report changes.   - Patient reassured of the benign nature of these lesions.      Procedures Performed:   None.    Follow-up: 1-2 year in person, earlier for new or changing lesions    Staff and Scribe:     Scribe Disclosure:   I, Andre Cueva, am serving as a scribe to document services personally performed by this physician, Dr. Roderick Johnson, based on data collection and the provider's statements to me.     Provider Disclosure:   The documentation recorded by the scribe accurately reflects the services I personally performed and the decisions made by me.    Roderick Johnson MD   of Dermatology  Department of Dermatology  [facility]      ____________________________________________    CC: Skin Check (Full body skin check. No spots of concern. Last skin check at facility in [place] several years ago. No personal or family history of skin cancer. )    HPI:  Ms. June Nunn is a(n) 62 year old female who presents today as a new patient for a skin check.    Referred to derm by Vannesa Cyr CNP for numerous moles. Note from 4/12/22 reviewed. A&P and exam unremarkable.    Today, she has no areas of concern.    Patient is otherwise feeling well, without additional skin  concerns.    Labs Reviewed:  N/A    Physical Exam:  Vitals: LMP 05/15/2015   SKIN: Total skin excluding the undergarment areas was performed. The exam included the head/face, neck, both arms, chest, back, abdomen, both legs, digits and/or nails.   - There are dome shaped bright red papules on the trunk and extremities.   - Multiple regular brown pigmented macules and papules are identified on the trunk and extremities.   - There are waxy stuck on tan to brown papules on the trunk and extremities.   - Scattered brown macules on sun exposed areas.  - There is a firm tan/flesh colored papule that dimples with lateral pressure on the left thigh.   - No other lesions of concern on areas examined.     Medications:  Current Outpatient Medications   Medication     amLODIPine (NORVASC) 10 MG tablet     levothyroxine (SYNTHROID) 100 MCG tablet     rosuvastatin (CRESTOR) 5 MG tablet     No current facility-administered medications for this visit.      Past Medical History:   Patient Active Problem List   Diagnosis     Hypothyroidism, unspecified type     Simple goiter     Health Care Home     Living will, counseling/discussion     Heart murmur     Symptomatic menopausal or female climacteric states     Hyperlipidemia with target LDL less than 130     Essential hypertension with goal blood pressure less than 140/90     Elevated liver enzymes- very mild 11/8/2013     Bilateral sensorineural hearing loss - left > right     Non morbid obesity due to excess calories     ASCUS favor benign     Past Medical History:   Diagnosis Date     Abdominal pain, epigastric 3/29/2007     Anxiety state, unspecified      ASCUS favor benign 2011    neg HPV     Hyperlipidemia LDL goal < 130     lipitor - fatigue, niaspan - some hot flashes. simvastatin = stomach upset' And pravastatin = muscle aches.        Hypertension goal BP (blood pressure) < 140/90     lisinopril 5mg= severe vertigo; metoprolol 25mg=ringing ears/cold extremities      Increased  "BMI 11/1/2017    Estimated body mass index is 31.53 kg/(m^2) as calculated from the following:   Height as of 10/2/17: 5' 3\" (1.6 m).   Weight as of 10/2/17: 178 lb (80.7 kg).      Low back ache     sees Dr. Dudley Lind - Chiropractor      Medication adverse effect 3/5/2014     Other forms of migraine, with intractable migraine, so stated, without mention of status migrainosus      Symptomatic menopausal or female climacteric states     bad hot flashes      Unspecified hypothyroidism         CC Vannesa Cyr, APRN CNP  [address] [place] [address] on close of this encounter.  "

## 2022-10-10 ENCOUNTER — TELEPHONE (OUTPATIENT)
Dept: DERMATOLOGY | Facility: CLINIC | Age: 62
End: 2022-10-10

## 2022-10-10 NOTE — TELEPHONE ENCOUNTER
Return in about 1 year (around 7/6/2023) for skin check    Rosalind ortega Procedure   Dermatology, General and Plastic Surgery, Urology Specialties   [facility] [address] [address]

## 2023-02-05 ENCOUNTER — MYC MEDICAL ADVICE (OUTPATIENT)
Dept: FAMILY MEDICINE | Facility: CLINIC | Age: 63
End: 2023-02-05
Payer: COMMERCIAL

## 2023-02-05 DIAGNOSIS — H91.90 DECREASED HEARING, UNSPECIFIED LATERALITY: Primary | ICD-10-CM

## 2023-02-06 ENCOUNTER — MYC MEDICAL ADVICE (OUTPATIENT)
Dept: FAMILY MEDICINE | Facility: CLINIC | Age: 63
End: 2023-02-06
Payer: COMMERCIAL

## 2023-02-06 NOTE — TELEPHONE ENCOUNTER
Vannesa Cyr, APRN CNP,    Spoke with Pt and now she would like the referral/order for a hearing test to be Outgoing and patients preference rather than saying [facility].    MARIA LUISA Schmid [facility] Referral Rep

## 2023-03-04 DIAGNOSIS — E78.5 HYPERLIPIDEMIA WITH TARGET LDL LESS THAN 130: ICD-10-CM

## 2023-03-04 DIAGNOSIS — E03.9 HYPOTHYROIDISM, UNSPECIFIED TYPE: ICD-10-CM

## 2023-03-04 DIAGNOSIS — I10 ESSENTIAL HYPERTENSION WITH GOAL BLOOD PRESSURE LESS THAN 140/90: ICD-10-CM

## 2023-03-06 RX ORDER — ROSUVASTATIN CALCIUM 5 MG/1
TABLET, COATED ORAL
Qty: 90 TABLET | Refills: 0 | Status: SHIPPED | OUTPATIENT
Start: 2023-03-06 | End: 2023-04-25

## 2023-03-06 RX ORDER — LEVOTHYROXINE SODIUM 100 UG/1
TABLET ORAL
Qty: 90 TABLET | Refills: 0 | Status: SHIPPED | OUTPATIENT
Start: 2023-03-06 | End: 2023-04-25

## 2023-03-06 RX ORDER — AMLODIPINE BESYLATE 10 MG/1
TABLET ORAL
Qty: 90 TABLET | Refills: 0 | Status: SHIPPED | OUTPATIENT
Start: 2023-03-06 | End: 2023-04-25

## 2023-03-06 NOTE — TELEPHONE ENCOUNTER
Appointments in Next Year    Apr 20, 2023 10:15 AM  (Arrive by 10:00 AM)  Screening Mammogram with MGMA1  [facility] ([facility]) [phone]   Apr 25, 2023  9:30 AM  (Arrive by 9:10 AM)  Preventative Adult Visit with MARGO Haney CNP  [facility] ([facility] ) [phone]

## 2023-04-20 ENCOUNTER — ANCILLARY PROCEDURE (OUTPATIENT)
Dept: MAMMOGRAPHY | Facility: CLINIC | Age: 63
End: 2023-04-20
Attending: NURSE PRACTITIONER
Payer: COMMERCIAL

## 2023-04-20 ENCOUNTER — ANCILLARY ORDERS (OUTPATIENT)
Dept: MAMMOGRAPHY | Facility: CLINIC | Age: 63
End: 2023-04-20

## 2023-04-20 ENCOUNTER — LAB (OUTPATIENT)
Dept: LAB | Facility: CLINIC | Age: 63
End: 2023-04-20
Payer: COMMERCIAL

## 2023-04-20 DIAGNOSIS — I10 ESSENTIAL HYPERTENSION WITH GOAL BLOOD PRESSURE LESS THAN 140/90: ICD-10-CM

## 2023-04-20 DIAGNOSIS — Z13.0 SCREENING FOR DISORDER OF BLOOD AND BLOOD-FORMING ORGANS: ICD-10-CM

## 2023-04-20 DIAGNOSIS — Z12.31 VISIT FOR SCREENING MAMMOGRAM: ICD-10-CM

## 2023-04-20 DIAGNOSIS — R79.89 ELEVATED LFTS: ICD-10-CM

## 2023-04-20 DIAGNOSIS — Z13.1 SCREENING FOR DIABETES MELLITUS (DM): ICD-10-CM

## 2023-04-20 DIAGNOSIS — E78.5 HYPERLIPIDEMIA WITH TARGET LDL LESS THAN 130: ICD-10-CM

## 2023-04-20 DIAGNOSIS — E03.9 HYPOTHYROIDISM, UNSPECIFIED TYPE: ICD-10-CM

## 2023-04-20 LAB
ALBUMIN SERPL-MCNC: 4.2 G/DL (ref 3.4–5)
ALP SERPL-CCNC: 107 U/L (ref 40–150)
ALT SERPL W P-5'-P-CCNC: 34 U/L (ref 0–50)
ANION GAP SERPL CALCULATED.3IONS-SCNC: 1 MMOL/L (ref 3–14)
AST SERPL W P-5'-P-CCNC: 35 U/L (ref 0–45)
BILIRUB DIRECT SERPL-MCNC: <0.1 MG/DL (ref 0–0.2)
BILIRUB SERPL-MCNC: 0.4 MG/DL (ref 0.2–1.3)
BUN SERPL-MCNC: 15 MG/DL (ref 7–30)
CALCIUM SERPL-MCNC: 9.5 MG/DL (ref 8.5–10.1)
CHLORIDE BLD-SCNC: 107 MMOL/L (ref 94–109)
CHOLEST SERPL-MCNC: 189 MG/DL
CO2 SERPL-SCNC: 30 MMOL/L (ref 20–32)
CREAT SERPL-MCNC: 0.78 MG/DL (ref 0.52–1.04)
CREAT UR-MCNC: 218 MG/DL
ERYTHROCYTE [DISTWIDTH] IN BLOOD BY AUTOMATED COUNT: 12 % (ref 10–15)
FASTING STATUS PATIENT QL REPORTED: YES
GFR SERPL CREATININE-BSD FRML MDRD: 85 ML/MIN/1.73M2
GLUCOSE BLD-MCNC: 107 MG/DL (ref 70–99)
HCT VFR BLD AUTO: 43.2 % (ref 35–47)
HDLC SERPL-MCNC: 56 MG/DL
HGB BLD-MCNC: 14.7 G/DL (ref 11.7–15.7)
LDLC SERPL CALC-MCNC: 110 MG/DL
MCH RBC QN AUTO: 29.9 PG (ref 26.5–33)
MCHC RBC AUTO-ENTMCNC: 34 G/DL (ref 31.5–36.5)
MCV RBC AUTO: 88 FL (ref 78–100)
MICROALBUMIN UR-MCNC: 19 MG/L
MICROALBUMIN/CREAT UR: 8.72 MG/G CR (ref 0–25)
NONHDLC SERPL-MCNC: 133 MG/DL
PLATELET # BLD AUTO: 275 10E3/UL (ref 150–450)
POTASSIUM BLD-SCNC: 4.4 MMOL/L (ref 3.4–5.3)
PROT SERPL-MCNC: 7.9 G/DL (ref 6.8–8.8)
RBC # BLD AUTO: 4.92 10E6/UL (ref 3.8–5.2)
SODIUM SERPL-SCNC: 138 MMOL/L (ref 133–144)
T4 FREE SERPL-MCNC: 1.1 NG/DL (ref 0.76–1.46)
TRIGL SERPL-MCNC: 114 MG/DL
TSH SERPL DL<=0.005 MIU/L-ACNC: 1.34 MU/L (ref 0.4–4)
WBC # BLD AUTO: 7.6 10E3/UL (ref 4–11)

## 2023-04-20 PROCEDURE — 82248 BILIRUBIN DIRECT: CPT

## 2023-04-20 PROCEDURE — 36415 COLL VENOUS BLD VENIPUNCTURE: CPT

## 2023-04-20 PROCEDURE — 85027 COMPLETE CBC AUTOMATED: CPT

## 2023-04-20 PROCEDURE — 84443 ASSAY THYROID STIM HORMONE: CPT

## 2023-04-20 PROCEDURE — 77067 SCR MAMMO BI INCL CAD: CPT | Performed by: RADIOLOGY

## 2023-04-20 PROCEDURE — 82570 ASSAY OF URINE CREATININE: CPT

## 2023-04-20 PROCEDURE — 80053 COMPREHEN METABOLIC PANEL: CPT

## 2023-04-20 PROCEDURE — 80061 LIPID PANEL: CPT

## 2023-04-20 PROCEDURE — 82043 UR ALBUMIN QUANTITATIVE: CPT

## 2023-04-20 PROCEDURE — 77063 BREAST TOMOSYNTHESIS BI: CPT | Performed by: RADIOLOGY

## 2023-04-20 PROCEDURE — 84439 ASSAY OF FREE THYROXINE: CPT

## 2023-04-20 ASSESSMENT — ENCOUNTER SYMPTOMS
NERVOUS/ANXIOUS: 0
SORE THROAT: 0
NAUSEA: 0
WEAKNESS: 0
CONSTIPATION: 0
EYE PAIN: 0
HEADACHES: 0
MYALGIAS: 1
ABDOMINAL PAIN: 0
COUGH: 0
HEMATURIA: 0
HEMATOCHEZIA: 0
CHILLS: 0
PARESTHESIAS: 0
DIARRHEA: 0
DIZZINESS: 0
BREAST MASS: 0
FREQUENCY: 0
PALPITATIONS: 0
JOINT SWELLING: 0
DYSURIA: 0
SHORTNESS OF BREATH: 0
HEARTBURN: 0
ARTHRALGIAS: 0
FEVER: 0

## 2023-04-23 NOTE — RESULT ENCOUNTER NOTE
Mary Jo Nunn,    Attached are your test results.  -Normal red blood cell (hgb) levels, normal white blood cell count and normal platelet levels.  -Cholesterol levels are at your goal levels.  ADVISE: continuing your medication, a regular exercise program with at least 150 minutes of aerobic exercise per week, and eating a low saturated fat/low carbohydrate diet.  Also, you should recheck this fasting cholesterol panel in 12 months.  -Liver and gallbladder tests (ALT,AST, Alk phos,bilirubin) are normal.  -Kidney function (GFR) is normal.  -Sodium is normal.  -Potassium is normal.  -Calcium is normal.  -Glucose is slight elevated and may be a sign of early diabetes (prediabetes). ADVISE:: eating a low carbohydrate diet, exercising, trying to lose weight (if necessary) and rechecking your glucose level in 12 months.   Please contact us if you have any questions.    Vannesa Cyr, CNP

## 2023-04-25 ENCOUNTER — OFFICE VISIT (OUTPATIENT)
Dept: FAMILY MEDICINE | Facility: CLINIC | Age: 63
End: 2023-04-25
Payer: COMMERCIAL

## 2023-04-25 VITALS
WEIGHT: 193.7 LBS | SYSTOLIC BLOOD PRESSURE: 125 MMHG | HEART RATE: 101 BPM | HEIGHT: 64 IN | TEMPERATURE: 98 F | DIASTOLIC BLOOD PRESSURE: 83 MMHG | OXYGEN SATURATION: 99 % | RESPIRATION RATE: 22 BRPM | BODY MASS INDEX: 33.07 KG/M2

## 2023-04-25 DIAGNOSIS — I10 ESSENTIAL HYPERTENSION WITH GOAL BLOOD PRESSURE LESS THAN 140/90: ICD-10-CM

## 2023-04-25 DIAGNOSIS — Z23 HIGH PRIORITY FOR 2019-NCOV VACCINE: ICD-10-CM

## 2023-04-25 DIAGNOSIS — E03.9 HYPOTHYROIDISM, UNSPECIFIED TYPE: ICD-10-CM

## 2023-04-25 DIAGNOSIS — E78.5 HYPERLIPIDEMIA WITH TARGET LDL LESS THAN 130: ICD-10-CM

## 2023-04-25 DIAGNOSIS — Z00.00 ROUTINE GENERAL MEDICAL EXAMINATION AT A HEALTH CARE FACILITY: Primary | ICD-10-CM

## 2023-04-25 PROBLEM — G43.809 VESTIBULAR MIGRAINE: Status: ACTIVE | Noted: 2022-10-27

## 2023-04-25 PROCEDURE — 0124A COVID-19 VACCINE BIVALENT BOOSTER 12+ (PFIZER): CPT | Performed by: NURSE PRACTITIONER

## 2023-04-25 PROCEDURE — 91312 COVID-19 VACCINE BIVALENT BOOSTER 12+ (PFIZER): CPT | Performed by: NURSE PRACTITIONER

## 2023-04-25 PROCEDURE — 99396 PREV VISIT EST AGE 40-64: CPT | Mod: 25 | Performed by: NURSE PRACTITIONER

## 2023-04-25 RX ORDER — ROSUVASTATIN CALCIUM 5 MG/1
5 TABLET, COATED ORAL DAILY
Qty: 90 TABLET | Refills: 3 | Status: SHIPPED | OUTPATIENT
Start: 2023-04-25 | End: 2024-05-13

## 2023-04-25 RX ORDER — LEVOTHYROXINE SODIUM 100 UG/1
100 TABLET ORAL DAILY
Qty: 90 TABLET | Refills: 3 | Status: SHIPPED | OUTPATIENT
Start: 2023-04-25 | End: 2024-05-13

## 2023-04-25 RX ORDER — AMLODIPINE BESYLATE 10 MG/1
10 TABLET ORAL DAILY
Qty: 90 TABLET | Refills: 3 | Status: SHIPPED | OUTPATIENT
Start: 2023-04-25 | End: 2024-05-13

## 2023-04-25 RX ORDER — DULOXETIN HYDROCHLORIDE 30 MG/1
30 CAPSULE, DELAYED RELEASE ORAL DAILY
COMMUNITY
Start: 2023-03-08

## 2023-04-25 ASSESSMENT — ENCOUNTER SYMPTOMS
FREQUENCY: 0
EYE PAIN: 0
DYSURIA: 0
CHILLS: 0
SHORTNESS OF BREATH: 0
HEMATOCHEZIA: 0
HEARTBURN: 0
HEADACHES: 0
WEAKNESS: 0
HEMATURIA: 0
MYALGIAS: 1
DIARRHEA: 0
PALPITATIONS: 0
ABDOMINAL PAIN: 0
DIZZINESS: 0
PARESTHESIAS: 0
CONSTIPATION: 0
COUGH: 0
NERVOUS/ANXIOUS: 0
BREAST MASS: 0
JOINT SWELLING: 0
SORE THROAT: 0
ARTHRALGIAS: 0
FEVER: 0
NAUSEA: 0

## 2023-04-25 ASSESSMENT — PAIN SCALES - GENERAL: PAINLEVEL: NO PAIN (0)

## 2023-04-25 NOTE — PATIENT INSTRUCTIONS
PLAN:   1.   Symptomatic therapy suggested: Continue current medications as prescribed.   2.  Orders Placed This Encounter   Medications    amLODIPine (NORVASC) 10 MG tablet     Sig: Take 1 tablet (10 mg) by mouth daily     Dispense:  90 tablet     Refill:  3    rosuvastatin (CRESTOR) 5 MG tablet     Sig: Take 1 tablet (5 mg) by mouth daily     Dispense:  90 tablet     Refill:  3    levothyroxine (SYNTHROID/LEVOTHROID) 100 MCG tablet     Sig: Take 1 tablet (100 mcg) by mouth daily     Dispense:  90 tablet     Refill:  3    DULoxetine (CYMBALTA) 30 MG capsule     Sig: Take 30 mg by mouth daily     Orders Placed This Encounter   Procedures    REVIEW OF HEALTH MAINTENANCE PROTOCOL ORDERS    COVID-19,PF,PFIZER BOOSTER BIVALENT 12+Yrs       3. Patient needs to follow up in if no improvement,or sooner if worsening of symptoms or other symptoms develop.  Work on weight loss  Regular exercise  Follow up office visit in one year for annual health maintenance exam, sooner PRN.    Preventive Health Recommendations  Female Ages 50 - 64    Yearly exam: See your health care provider every year in order to  Review health changes.   Discuss preventive care.    Review your medicines if your doctor has prescribed any.    Get a Pap test every three years (unless you have an abnormal result and your provider advises testing more often).  If you get Pap tests with HPV test, you only need to test every 5 years, unless you have an abnormal result.   You do not need a Pap test if your uterus was removed (hysterectomy) and you have not had cancer.  You should be tested each year for STDs (sexually transmitted diseases) if you're at risk.   Have a mammogram every 1 to 2 years.  Have a colonoscopy at age 50, or have a yearly FIT test (stool test). These exams screen for colon cancer.    Have a cholesterol test every 5 years, or more often if advised.  Have a diabetes test (fasting glucose) every three years. If you are at risk for diabetes,  you should have this test more often.   If you are at risk for osteoporosis (brittle bone disease), think about having a bone density scan (DEXA).    Shots: Get a flu shot each year. Get a tetanus shot every 10 years.    Nutrition:   Eat at least 5 servings of fruits and vegetables each day.  Eat whole-grain bread, whole-wheat pasta and brown rice instead of white grains and rice.  Get adequate Calcium and Vitamin D.     Lifestyle  Exercise at least 150 minutes a week (30 minutes a day, 5 days a week). This will help you control your weight and prevent disease.  Limit alcohol to one drink per day.  No smoking.   Wear sunscreen to prevent skin cancer.   See your dentist every six months for an exam and cleaning.  See your eye doctor every 1 to 2 years.

## 2023-04-25 NOTE — PROGRESS NOTES
Answers for HPI/ROS submitted by the patient on 4/20/2023  Frequency of exercise:: 6-7 days/week  Getting at least 3 servings of Calcium per day:: Yes  Diet:: Regular (no restrictions)  Taking medications regularly:: Yes  Medication side effects:: Muscle aches  Bi-annual eye exam:: Yes  Dental care twice a year:: Yes  Sleep apnea or symptoms of sleep apnea:: None  abdominal pain: No  Blood in stool: No  Blood in urine: No  chest pain: No  chills: No  congestion: No  constipation: No  cough: No  diarrhea: No  dizziness: No  ear pain: No  eye pain: No  nervous/anxious: No  fever: No  frequency: No  genital sores: No  headaches: No  hearing loss: Yes  heartburn: No  arthralgias: No  joint swelling: No  peripheral edema: No  mood changes: No  myalgias: Yes  nausea: No  dysuria: No  palpitations: No  Skin sensation changes: No  sore throat: No  urgency: No  rash: No  shortness of breath: No  visual disturbance: No  weakness: No  pelvic pain: No  vaginal bleeding: No  vaginal discharge: No  tenderness: No  breast mass: No  breast discharge: No  Additional concerns today:: No  Duration of exercise:: 45-60 minutes       SUBJECTIVE:   CC: June is an 62 year old who presents for preventive health visit.        View : No data to display.              Patient has been advised of split billing requirements and indicates understanding: Yes  Healthy Habits:     Getting at least 3 servings of Calcium per day:  Yes    Bi-annual eye exam:  Yes    Dental care twice a year:  Yes    Sleep apnea or symptoms of sleep apnea:  None    Diet:  Regular (no restrictions)    Frequency of exercise:  6-7 days/week    Duration of exercise:  45-60 minutes    Taking medications regularly:  Yes    Medication side effects:  Muscle aches    PHQ-2 Total Score: 0    Additional concerns today:  No      Today's PHQ-2 Score:       4/20/2023     3:30 PM   PHQ-2 ( 1999 [employer])   Q1: Little interest or pleasure in doing things 0   Q2: Feeling down,  depressed or hopeless 0   PHQ-2 Score 0   Q1: Little interest or pleasure in doing things Not at all   Q2: Feeling down, depressed or hopeless Not at all   PHQ-2 Score 0       Have you ever done Advance Care Planning? (For example, a Health Directive, POLST, or a discussion with a medical provider or your loved ones about your wishes): Yes, patient states has an Advance Care Planning document and will bring a copy to the clinic.    Social History     Tobacco Use     Smoking status: Never     Smokeless tobacco: Never   Vaping Use     Vaping status: Never Used   Substance Use Topics     Alcohol use: Yes     Comment: occasional beer              4/20/2023     3:29 PM   Alcohol Use   Prescreen: >3 drinks/day or >7 drinks/week? No     Reviewed orders with patient.  Reviewed health maintenance and updated orders accordingly - Yes  Lab work is in process  Labs reviewed in EPIC  BP Readings from Last 3 Encounters:   04/25/23 125/83   07/06/22 (!) 146/86   04/12/22 127/78    Wt Readings from Last 3 Encounters:   04/25/23 87.9 kg (193 lb 11.2 oz)   04/12/22 85.3 kg (188 lb)   02/15/21 85.7 kg (189 lb)                  Patient Active Problem List   Diagnosis     Hypothyroidism, unspecified type     Simple goiter     Health Care Home     Living will, counseling/discussion     Heart murmur     Symptomatic menopausal or female climacteric states     Hyperlipidemia with target LDL less than 130     Essential hypertension with goal blood pressure less than 140/90     Elevated liver enzymes- very mild 11/8/2013     Bilateral sensorineural hearing loss - left > right     Non morbid obesity due to excess calories     ASCUS favor benign     Vestibular migraine     Past Surgical History:   Procedure Laterality Date     COLONOSCOPY WITH CO2 INSUFFLATION N/A 12/21/2020    Procedure: COLONOSCOPY, WITH CO2 INSUFFLATION;  Surgeon: Ciro Graves DO;  Location:  OR      TOOTH EXTRACTION W/FORCEP      wisdom       Social History      Tobacco Use     Smoking status: Never     Smokeless tobacco: Never   Vaping Use     Vaping status: Never Used   Substance Use Topics     Alcohol use: Yes     Comment: occasional beer      Family History   Problem Relation Age of Onset     C.A.D. Father         MI at 71,  of prostate Cancer     Cancer Father         prostate cancer     Lipids Mother      Thyroid Disease Mother      Diabetes Mother         Prediabetic for years     Hypertension Mother      Hyperlipidemia Mother      Cerebrovascular Disease Mother      Hyperlipidemia Brother      Hypertension Brother      Breast Cancer Sister      Thyroid Disease Sister      Hyperlipidemia Sister      Breast Cancer Sister      Thyroid Disease Sister      Thyroid Disease Sister      Thyroid Disease Sister      Breast Cancer Sister      Cancer - colorectal No family hx of          Current Outpatient Medications   Medication Sig Dispense Refill     amLODIPine (NORVASC) 10 MG tablet Take 1 tablet (10 mg) by mouth daily 90 tablet 3     levothyroxine (SYNTHROID/LEVOTHROID) 100 MCG tablet Take 1 tablet (100 mcg) by mouth daily 90 tablet 3     rosuvastatin (CRESTOR) 5 MG tablet Take 1 tablet (5 mg) by mouth daily 90 tablet 3     DULoxetine (CYMBALTA) 30 MG capsule Take 30 mg by mouth daily       Allergies   Allergen Reactions     Lisinopril Other (See Comments)     lisinopril 5mg= severe vertigo     Metoprolol      Ringing in ears & extremities feeling cold       Breast Cancer Screening:    FHS-7:       2/15/2021     8:58 AM 2022    11:28 AM 2023     9:05 AM   Breast CA Risk Assessment (FHS-7)   Did any of your first-degree relatives have breast or ovarian cancer? Yes Yes Yes   Did any of your relatives have bilateral breast cancer? No No No   Did any man in your family have breast cancer? No No No   Did any woman in your family have breast and ovarian cancer? No No No   Did any woman in your family have breast cancer before age 50 y? Yes No No   Do you have  "2 or more relatives with breast and/or ovarian cancer? Yes No No   Do you have 2 or more relatives with breast and/or bowel cancer? Yes No No       Mammogram Screening: Recommended mammography every 1-2 years with patient discussion and risk factor consideration  Pertinent mammograms are reviewed under the imaging tab.    History of abnormal Pap smear: NO - age 30-65 PAP every 5 years with negative HPV co-testing recommended      Latest Ref Rng & Units 1/28/2020     2:15 PM 1/28/2020     2:05 PM 12/7/2016     6:57 AM   PAP / HPV   PAP (Historical)  NIL       HPV 16 DNA NEG^Negative  Negative   Negative     HPV 18 DNA NEG^Negative  Negative   Negative     Other HR HPV NEG^Negative  Negative   Negative       Reviewed and updated as needed this visit by clinical staff                  Reviewed and updated as needed this visit by Provider                 Past Medical History:   Diagnosis Date     Abdominal pain, epigastric 3/29/2007     Anxiety state, unspecified      ASCUS favor benign 2011    neg HPV     Hyperlipidemia LDL goal < 130     lipitor - fatigue, niaspan - some hot flashes. simvastatin = stomach upset' And pravastatin = muscle aches.        Hypertension goal BP (blood pressure) < 140/90     lisinopril 5mg= severe vertigo; metoprolol 25mg=ringing ears/cold extremities      Increased BMI 11/1/2017    Estimated body mass index is 31.53 kg/(m^2) as calculated from the following:   Height as of 10/2/17: 5' 3\" (1.6 m).   Weight as of 10/2/17: 178 lb (80.7 kg).      Low back ache     sees Dr. Dudley Lind - Chiropractor      Medication adverse effect 3/5/2014     Other forms of migraine, with intractable migraine, so stated, without mention of status migrainosus      Symptomatic menopausal or female climacteric states     bad hot flashes      Unspecified hypothyroidism       Past Surgical History:   Procedure Laterality Date     COLONOSCOPY WITH CO2 INSUFFLATION N/A 12/21/2020    Procedure: COLONOSCOPY, WITH CO2 " "INSUFFLATION;  Surgeon: Ciro Graves DO;  Location: [facility] OR     HC TOOTH EXTRACTION W/FORCEP      wisdom       Review of Systems   Constitutional: Negative for chills and fever.   HENT: Positive for hearing loss. Negative for congestion, ear pain and sore throat.         Continues issues with her ears   Has hearing aides has helped but has seen multiple ENT and reassure has been told been normal    Eyes: Negative for pain and visual disturbance.   Respiratory: Negative for cough and shortness of breath.    Cardiovascular: Negative for chest pain, palpitations and peripheral edema.   Gastrointestinal: Negative for abdominal pain, constipation, diarrhea, heartburn, hematochezia and nausea.   Breasts:  Negative for tenderness, breast mass and discharge.   Genitourinary: Negative for dysuria, frequency, genital sores, hematuria, pelvic pain, urgency, vaginal bleeding and vaginal discharge.   Musculoskeletal: Positive for myalgias. Negative for arthralgias and joint swelling.   Skin: Negative for rash.   Neurological: Negative for dizziness, weakness, headaches and paresthesias.   Psychiatric/Behavioral: Negative for mood changes. The patient is not nervous/anxious.        OBJECTIVE:   /83 (BP Location: Right arm, Patient Position: Sitting, Cuff Size: Adult Large)   Pulse 101   Temp 98  F (36.7  C) (Tympanic)   Resp 22   Ht 1.616 m (5' 3.62\")   Wt 87.9 kg (193 lb 11.2 oz)   LMP 05/15/2015   SpO2 99%   BMI 33.64 kg/m    Physical Exam  GENERAL APPEARANCE: healthy, alert and no distress  EYES: Eyes grossly normal to inspection and conjunctivae and sclerae normal  HENT: ear canals and TM's normal, nose and mouth without ulcers or lesions, oropharynx clear and oral mucous membranes moist  NECK: no adenopathy, no asymmetry, masses, or scars and thyroid normal to palpation  RESP: lungs clear to auscultation - no rales, rhonchi or wheezes  BREAST: normal without masses, tenderness or nipple discharge and no " palpable axillary masses or adenopathy  CV: regular rates and rhythm, no murmur, click or rub, no peripheral edema and peripheral pulses strong  ABDOMEN: soft, nontender, no hepatosplenomegaly, no masses and bowel sounds normal   (female): normal female external genitalia, normal urethral meatus, vaginal mucosal atrophy noted, normal cervix, adnexae, and uterus without masses or abnormal discharge  MS: no musculoskeletal defects are noted and gait is age appropriate without ataxia  SKIN: no suspicious lesions or rashes  NEURO: Normal strength and tone, sensory exam grossly normal, mentation intact and speech normal  PSYCH: mentation appears normal and affect normal/bright    Diagnostic Test Results:  Labs reviewed in Epic  Recent Results (from the past 672 hour(s))   Albumin Random Urine Quantitative with Creat Ratio    Collection Time: 04/20/23  8:25 AM   Result Value Ref Range    Creatinine Urine mg/dL 218 mg/dL    Albumin Urine mg/L 19 mg/L    Albumin Urine mg/g Cr 8.72 0.00 - 25.00 mg/g Cr   T4 free    Collection Time: 04/20/23  8:25 AM   Result Value Ref Range    Free T4 1.10 0.76 - 1.46 ng/dL   CBC with platelets    Collection Time: 04/20/23  8:25 AM   Result Value Ref Range    WBC Count 7.6 4.0 - 11.0 10e3/uL    RBC Count 4.92 3.80 - 5.20 10e6/uL    Hemoglobin 14.7 11.7 - 15.7 g/dL    Hematocrit 43.2 35.0 - 47.0 %    MCV 88 78 - 100 fL    MCH 29.9 26.5 - 33.0 pg    MCHC 34.0 31.5 - 36.5 g/dL    RDW 12.0 10.0 - 15.0 %    Platelet Count 275 150 - 450 10e3/uL   Comprehensive metabolic panel    Collection Time: 04/20/23  8:25 AM   Result Value Ref Range    Sodium 138 133 - 144 mmol/L    Potassium 4.4 3.4 - 5.3 mmol/L    Chloride 107 94 - 109 mmol/L    Carbon Dioxide (CO2) 30 20 - 32 mmol/L    Anion Gap 1 (L) 3 - 14 mmol/L    Urea Nitrogen 15 7 - 30 mg/dL    Creatinine 0.78 0.52 - 1.04 mg/dL    Calcium 9.5 8.5 - 10.1 mg/dL    Glucose 107 (H) 70 - 99 mg/dL    Alkaline Phosphatase 107 40 - 150 U/L    AST 35 0 - 45  U/L    ALT 34 0 - 50 U/L    Protein Total 7.9 6.8 - 8.8 g/dL    Albumin 4.2 3.4 - 5.0 g/dL    Bilirubin Total 0.4 0.2 - 1.3 mg/dL    GFR Estimate 85 >60 mL/min/1.73m2   TSH    Collection Time: 04/20/23  8:25 AM   Result Value Ref Range    TSH 1.34 0.40 - 4.00 mU/L   Lipid panel reflex to direct LDL Fasting    Collection Time: 04/20/23  8:25 AM   Result Value Ref Range    Cholesterol 189 <200 mg/dL    Triglycerides 114 <150 mg/dL    Direct Measure HDL 56 >=50 mg/dL    LDL Cholesterol Calculated 110 (H) <=100 mg/dL    Non HDL Cholesterol 133 (H) <130 mg/dL    Patient Fasting > 8hrs? Yes    Bilirubin direct    Collection Time: 04/20/23  8:25 AM   Result Value Ref Range    Bilirubin Direct <0.1 0.0 - 0.2 mg/dL         ASSESSMENT/PLAN:   June was seen today for physical and imm/inj.    Diagnoses and all orders for this visit:    Routine general medical examination at a health care facility  -     REVIEW OF HEALTH MAINTENANCE PROTOCOL ORDERS    Essential hypertension with goal blood pressure less than 140/90  -     amLODIPine (NORVASC) 10 MG tablet; Take 1 tablet (10 mg) by mouth daily    Hyperlipidemia with target LDL less than 130- uncertain control - awaiting labs  -     rosuvastatin (CRESTOR) 5 MG tablet; Take 1 tablet (5 mg) by mouth daily    Hypothyroidism, unspecified type  -     levothyroxine (SYNTHROID/LEVOTHROID) 100 MCG tablet; Take 1 tablet (100 mcg) by mouth daily    High priority for 2019-nCoV vaccine  -     COVID-19,PF,PFIZER BOOSTER BIVALENT 12+Yrs    PLAN:   The current medical regimen is effective.  Continue current medication regimen unchanged.  Patient needs to follow up in if no improvement,or sooner if worsening of symptoms or other symptoms develop.  Work on weight loss  Regular exercise  Follow up office visit in one year for annual health maintenance exam, sooner PRN.    Patient has been advised of split billing requirements and indicates understanding: Yes      COUNSELING:  Reviewed preventive  health counseling, as reflected in patient instructions  Special attention given to:        Regular exercise       Healthy diet/nutrition       Osteoporosis prevention/bone health       Colorectal Cancer Screening       Consider Hep C screening for all patients one time for ages 18-79 years       The 10-year ASCVD risk score (Violetta CONTI, et al., 2019) is: 5%    Values used to calculate the score:      Age: 62 years      Sex: Female      Is Non- : No      Diabetic: No      Tobacco smoker: No      Systolic Blood Pressure: 125 mmHg      Is BP treated: Yes      HDL Cholesterol: 56 mg/dL      Total Cholesterol: 189 mg/dL       Advance Care Planning        She reports that she has never smoked. She has never used smokeless tobacco.          MARGO Haney CNP  [facility]

## 2023-07-04 ENCOUNTER — MYC REFILL (OUTPATIENT)
Dept: FAMILY MEDICINE | Facility: CLINIC | Age: 63
End: 2023-07-04
Payer: COMMERCIAL

## 2023-07-04 RX ORDER — DULOXETIN HYDROCHLORIDE 30 MG/1
30 CAPSULE, DELAYED RELEASE ORAL DAILY
Status: CANCELLED | OUTPATIENT
Start: 2023-07-04

## 2023-07-06 NOTE — TELEPHONE ENCOUNTER
I do not fill this medication for her   I believe this may be refilled by Dr Temple     
RN called patient and LVM to call clinic at [phone].     If patient calls back please relay provider message below.    TED Watson  [facility] Triage  
Spoke with pt and relayed message    Kadeem GLASS [facility]    Primary Care  
0

## 2024-03-21 ENCOUNTER — PATIENT OUTREACH (OUTPATIENT)
Dept: CARE COORDINATION | Facility: CLINIC | Age: 64
End: 2024-03-21
Payer: COMMERCIAL

## 2024-03-22 ENCOUNTER — MYC MEDICAL ADVICE (OUTPATIENT)
Dept: FAMILY MEDICINE | Facility: CLINIC | Age: 64
End: 2024-03-22
Payer: COMMERCIAL

## 2024-03-22 DIAGNOSIS — Z13.0 SCREENING FOR DISORDER OF BLOOD AND BLOOD-FORMING ORGANS: ICD-10-CM

## 2024-03-22 DIAGNOSIS — I10 ESSENTIAL HYPERTENSION WITH GOAL BLOOD PRESSURE LESS THAN 140/90: Primary | ICD-10-CM

## 2024-03-22 DIAGNOSIS — E78.5 HYPERLIPIDEMIA WITH TARGET LDL LESS THAN 130: ICD-10-CM

## 2024-03-22 DIAGNOSIS — E03.9 HYPOTHYROIDISM, UNSPECIFIED TYPE: ICD-10-CM

## 2024-03-22 NOTE — TELEPHONE ENCOUNTER
Routing to provider to review and advise.     Shalonda Forde, HUBERTN, RN   [facility]

## 2024-04-18 ENCOUNTER — PATIENT OUTREACH (OUTPATIENT)
Dept: CARE COORDINATION | Facility: CLINIC | Age: 64
End: 2024-04-18
Payer: COMMERCIAL

## 2024-05-06 ENCOUNTER — ANCILLARY PROCEDURE (OUTPATIENT)
Dept: MAMMOGRAPHY | Facility: CLINIC | Age: 64
End: 2024-05-06
Attending: NURSE PRACTITIONER
Payer: COMMERCIAL

## 2024-05-06 ENCOUNTER — LAB (OUTPATIENT)
Dept: LAB | Facility: CLINIC | Age: 64
End: 2024-05-06
Payer: COMMERCIAL

## 2024-05-06 DIAGNOSIS — Z12.31 VISIT FOR SCREENING MAMMOGRAM: ICD-10-CM

## 2024-05-06 DIAGNOSIS — E78.5 HYPERLIPIDEMIA WITH TARGET LDL LESS THAN 130: ICD-10-CM

## 2024-05-06 DIAGNOSIS — I10 ESSENTIAL HYPERTENSION WITH GOAL BLOOD PRESSURE LESS THAN 140/90: ICD-10-CM

## 2024-05-06 DIAGNOSIS — E03.9 HYPOTHYROIDISM, UNSPECIFIED TYPE: ICD-10-CM

## 2024-05-06 DIAGNOSIS — Z13.0 SCREENING FOR DISORDER OF BLOOD AND BLOOD-FORMING ORGANS: ICD-10-CM

## 2024-05-06 LAB
ALBUMIN SERPL BCG-MCNC: 4.7 G/DL (ref 3.5–5.2)
ALP SERPL-CCNC: 101 U/L (ref 40–150)
ALT SERPL W P-5'-P-CCNC: 29 U/L (ref 0–50)
ANION GAP SERPL CALCULATED.3IONS-SCNC: 9 MMOL/L (ref 7–15)
AST SERPL W P-5'-P-CCNC: 34 U/L (ref 0–45)
BILIRUB SERPL-MCNC: 0.4 MG/DL
BUN SERPL-MCNC: 15.9 MG/DL (ref 8–23)
CALCIUM SERPL-MCNC: 10.3 MG/DL (ref 8.8–10.2)
CHLORIDE SERPL-SCNC: 102 MMOL/L (ref 98–107)
CHOLEST SERPL-MCNC: 215 MG/DL
CREAT SERPL-MCNC: 0.85 MG/DL (ref 0.51–0.95)
CREAT UR-MCNC: 280 MG/DL
DEPRECATED HCO3 PLAS-SCNC: 27 MMOL/L (ref 22–29)
EGFRCR SERPLBLD CKD-EPI 2021: 77 ML/MIN/1.73M2
ERYTHROCYTE [DISTWIDTH] IN BLOOD BY AUTOMATED COUNT: 12 % (ref 10–15)
FASTING STATUS PATIENT QL REPORTED: ABNORMAL
GLUCOSE SERPL-MCNC: 103 MG/DL (ref 70–99)
HCT VFR BLD AUTO: 43.6 % (ref 35–47)
HDLC SERPL-MCNC: 55 MG/DL
HGB BLD-MCNC: 14.9 G/DL (ref 11.7–15.7)
LDLC SERPL CALC-MCNC: 130 MG/DL
MCH RBC QN AUTO: 29.9 PG (ref 26.5–33)
MCHC RBC AUTO-ENTMCNC: 34.2 G/DL (ref 31.5–36.5)
MCV RBC AUTO: 87 FL (ref 78–100)
MICROALBUMIN UR-MCNC: 28.9 MG/L
MICROALBUMIN/CREAT UR: 10.32 MG/G CR (ref 0–25)
NONHDLC SERPL-MCNC: 160 MG/DL
PLATELET # BLD AUTO: 310 10E3/UL (ref 150–450)
POTASSIUM SERPL-SCNC: 5 MMOL/L (ref 3.4–5.3)
PROT SERPL-MCNC: 7.8 G/DL (ref 6.4–8.3)
RBC # BLD AUTO: 4.99 10E6/UL (ref 3.8–5.2)
SODIUM SERPL-SCNC: 138 MMOL/L (ref 135–145)
T4 FREE SERPL-MCNC: 1.5 NG/DL (ref 0.9–1.7)
TRIGL SERPL-MCNC: 148 MG/DL
TSH SERPL DL<=0.005 MIU/L-ACNC: 2.7 UIU/ML (ref 0.3–4.2)
WBC # BLD AUTO: 6.9 10E3/UL (ref 4–11)

## 2024-05-06 PROCEDURE — 80053 COMPREHEN METABOLIC PANEL: CPT

## 2024-05-06 PROCEDURE — 36415 COLL VENOUS BLD VENIPUNCTURE: CPT

## 2024-05-06 PROCEDURE — 77067 SCR MAMMO BI INCL CAD: CPT | Mod: TC | Performed by: RADIOLOGY

## 2024-05-06 PROCEDURE — 84443 ASSAY THYROID STIM HORMONE: CPT

## 2024-05-06 PROCEDURE — 80061 LIPID PANEL: CPT

## 2024-05-06 PROCEDURE — 77063 BREAST TOMOSYNTHESIS BI: CPT | Mod: TC | Performed by: RADIOLOGY

## 2024-05-06 PROCEDURE — 82570 ASSAY OF URINE CREATININE: CPT

## 2024-05-06 PROCEDURE — 82043 UR ALBUMIN QUANTITATIVE: CPT

## 2024-05-06 PROCEDURE — 84439 ASSAY OF FREE THYROXINE: CPT

## 2024-05-06 PROCEDURE — 85027 COMPLETE CBC AUTOMATED: CPT

## 2024-05-06 SDOH — HEALTH STABILITY: PHYSICAL HEALTH: ON AVERAGE, HOW MANY MINUTES DO YOU ENGAGE IN EXERCISE AT THIS LEVEL?: 50 MIN

## 2024-05-06 SDOH — HEALTH STABILITY: PHYSICAL HEALTH: ON AVERAGE, HOW MANY DAYS PER WEEK DO YOU ENGAGE IN MODERATE TO STRENUOUS EXERCISE (LIKE A BRISK WALK)?: 7 DAYS

## 2024-05-06 ASSESSMENT — SOCIAL DETERMINANTS OF HEALTH (SDOH): HOW OFTEN DO YOU GET TOGETHER WITH FRIENDS OR RELATIVES?: THREE TIMES A WEEK

## 2024-05-13 ENCOUNTER — OFFICE VISIT (OUTPATIENT)
Dept: FAMILY MEDICINE | Facility: CLINIC | Age: 64
End: 2024-05-13
Payer: COMMERCIAL

## 2024-05-13 VITALS
WEIGHT: 194.19 LBS | BODY MASS INDEX: 34.41 KG/M2 | TEMPERATURE: 98.1 F | HEIGHT: 63 IN | RESPIRATION RATE: 22 BRPM | OXYGEN SATURATION: 99 % | HEART RATE: 94 BPM | DIASTOLIC BLOOD PRESSURE: 82 MMHG | SYSTOLIC BLOOD PRESSURE: 136 MMHG

## 2024-05-13 DIAGNOSIS — I10 ESSENTIAL HYPERTENSION WITH GOAL BLOOD PRESSURE LESS THAN 140/90: ICD-10-CM

## 2024-05-13 DIAGNOSIS — Z00.00 ROUTINE GENERAL MEDICAL EXAMINATION AT A HEALTH CARE FACILITY: Primary | ICD-10-CM

## 2024-05-13 DIAGNOSIS — E78.5 HYPERLIPIDEMIA WITH TARGET LDL LESS THAN 130: ICD-10-CM

## 2024-05-13 DIAGNOSIS — E03.9 HYPOTHYROIDISM, UNSPECIFIED TYPE: ICD-10-CM

## 2024-05-13 DIAGNOSIS — G43.809 VESTIBULAR MIGRAINE: ICD-10-CM

## 2024-05-13 PROCEDURE — 99213 OFFICE O/P EST LOW 20 MIN: CPT | Mod: 25 | Performed by: NURSE PRACTITIONER

## 2024-05-13 PROCEDURE — 99396 PREV VISIT EST AGE 40-64: CPT | Performed by: NURSE PRACTITIONER

## 2024-05-13 RX ORDER — AMLODIPINE BESYLATE 10 MG/1
10 TABLET ORAL DAILY
Qty: 90 TABLET | Refills: 3 | Status: SHIPPED | OUTPATIENT
Start: 2024-05-13

## 2024-05-13 RX ORDER — ROSUVASTATIN CALCIUM 10 MG/1
10 TABLET, COATED ORAL DAILY
Qty: 90 TABLET | Refills: 3 | Status: SHIPPED | OUTPATIENT
Start: 2024-05-13

## 2024-05-13 RX ORDER — LEVOTHYROXINE SODIUM 100 UG/1
100 TABLET ORAL DAILY
Qty: 90 TABLET | Refills: 3 | Status: SHIPPED | OUTPATIENT
Start: 2024-05-13

## 2024-05-13 RX ORDER — ROSUVASTATIN CALCIUM 5 MG/1
5 TABLET, COATED ORAL DAILY
Qty: 90 TABLET | Refills: 3 | Status: CANCELLED | OUTPATIENT
Start: 2024-05-13

## 2024-05-13 NOTE — PATIENT INSTRUCTIONS
"PLAN:   1.   Symptomatic therapy suggested: will increase your Crestor to 10 mg .  Increase calcium to 1000mg and 800iu Vit D     2.  Orders Placed This Encounter   Medications    amLODIPine (NORVASC) 10 MG tablet     Sig: Take 1 tablet (10 mg) by mouth daily     Dispense:  90 tablet     Refill:  3    levothyroxine (SYNTHROID/LEVOTHROID) 100 MCG tablet     Sig: Take 1 tablet (100 mcg) by mouth daily     Dispense:  90 tablet     Refill:  3    rosuvastatin (CRESTOR) 10 MG tablet     Sig: Take 1 tablet (10 mg) by mouth daily     Dispense:  90 tablet     Refill:  3     Orders Placed This Encounter   Procedures    REVIEW OF HEALTH MAINTENANCE PROTOCOL ORDERS     3.  Work on weight loss  Regular exercise  Follow up office visit in one year for annual health maintenance exam, sooner PRN.   Patient needs to follow up in if no improvement,or sooner if worsening of symptoms or other symptoms develop.  Preventive Care Advice   This is general advice we often give to help people stay healthy. Your care team may have specific advice just for you. Please talk to your care team about your own preventive care needs.  Lifestyle  Exercise at least 150 minutes each week (30 minutes a day, 5 days a week).  Do muscle strengthening activities 2 days a week. These help control your weight and prevent disease.  No smoking.  Wear sunscreen to prevent skin cancer.  Have your home tested for radon every 2 to 5 years. Radon is a colorless, odorless gas that can harm your lungs. To learn more, go to www.health.[facility].[place]. and search for \"Radon in Homes.\"  Keep guns unloaded and locked up in a safe place like a safe or gun vault, or, use a gun lock and hide the keys. Always lock away bullets separately. To learn more, visit "[employer].[place].gov and search for \"safe gun storage.\"  Nutrition  Eat 5 or more servings of fruits and vegetables each day.  Try wheat bread, brown rice and whole grain pasta (instead of white bread, rice, and pasta).  Get enough " calcium and vitamin D. Check the label on foods and aim for 100% of the RDA (recommended daily allowance).  Regular exams  Have a dental exam and cleaning every 6 months.  See your health care team every year to talk about:  Any changes in your health.  Any medicines your care team has prescribed.  Preventive care, family planning, and ways to prevent chronic diseases.  Shots (vaccines)   HPV shots (up to age 26), if you've never had them before.  Hepatitis B shots (up to age 59), if you've never had them before.  COVID-19 shot: Get this shot when it's due.  Flu shot: Get a flu shot every year.  Tetanus shot: Get a tetanus shot every 10 years.  Pneumococcal, hepatitis A, and RSV shots: Ask your care team if you need these based on your risk.  Shingles shot (for age 50 and up).  General health tests  Diabetes screening:  Starting at age 35, Get screened for diabetes at least every 3 years.  If you are younger than age 35, ask your care team if you should be screened for diabetes.  Cholesterol test: At age 39, start having a cholesterol test every 5 years, or more often if advised.  Bone density scan (DEXA): At age 50, ask your care team if you should have this scan for osteoporosis (brittle bones).  Hepatitis C: Get tested at least once in your life.  Abdominal aortic aneurysm screening: Talk to your doctor about having this screening if you:  Have ever smoked; and  Are biologically male; and  Are between the ages of 65 and 75.  STIs (sexually transmitted infections)  Before age 24: Ask your care team if you should be screened for STIs.  After age 24: Get screened for STIs if you're at risk. You are at risk for STIs (including HIV) if:  You are sexually active with more than one person.  You don't use condoms every time.  You or a partner was diagnosed with a sexually transmitted infection.  If you are at risk for HIV, ask about PrEP medicine to prevent HIV.  Get tested for HIV at least once in your life, whether you  are at risk for HIV or not.  Cancer screening tests  Cervical cancer screening: If you have a cervix, begin getting regular cervical cancer screening tests at age 21. Most people who have regular screenings with normal results can stop after age 65. Talk about this with your provider.  Breast cancer scan (mammogram): If you've ever had breasts, begin having regular mammograms starting at age 40. This is a scan to check for breast cancer.  Colon cancer screening: It is important to start screening for colon cancer at age 45.  Have a colonoscopy test every 10 years (or more often if you're at risk) Or, ask your provider about stool tests like a FIT test every year or Cologuard test every 3 years.  To learn more about your testing options, visit: www.[employer]/848333.pdf.  For help making a decision, visit: iliana/xx84344.  Prostate cancer screening test: If you have a prostate and are age 55 to 69, ask your provider if you would benefit from a yearly prostate cancer screening test.  Lung cancer screening: If you are a current or former smoker age 50 to 80, ask your care team if ongoing lung cancer screenings are right for you.  For informational purposes only. Not to replace the advice of your health care provider. Copyright   2023 [employer]. All rights reserved. Clinically reviewed by the [facility] Transitions Program. [employer] [id number] - REV 04/24.

## 2024-05-13 NOTE — PROGRESS NOTES
"Preventive Care Visit  [facility]  Vannesa MARGO Lemon CNP, Internal Medicine - Pediatrics  May 13, 2024      Assessment & Plan     Routine general medical examination at a health care facility    Essential hypertension with goal blood pressure less than 140/90  The current medical regimen is effective.  Continue current medication regimen unchanged.  - amLODIPine (NORVASC) 10 MG tablet  Dispense: 90 tablet; Refill: 3    Hypothyroidism, unspecified type  The current medical regimen is effective.  Continue current medication regimen unchanged.  - levothyroxine (SYNTHROID/LEVOTHROID) 100 MCG tablet  Dispense: 90 tablet; Refill: 3    Hyperlipidemia with target LDL less than 130- uncertain control - awaiting labs  Hyperlipidemia Plan:  Regular exercise and a low fat diet are encouraged.  A fasting lipid profile is obtained today.  Side effects of cholesterol medications discussed.  Patient is to follow-up in 12 months for a fasting lipid profile.  Increase Crestor to 10 mg a day   - rosuvastatin (CRESTOR) 10 MG tablet  Dispense: 90 tablet; Refill: 3    Vestibular migraine  FOLLOW UP WITH SPECIALIST :Neurology        Patient has been advised of split billing requirements and indicates understanding: Yes  Review of prior external note(s) from - [facility] information from [facility]  reviewed  Prescription drug management   Time spent by me doing chart review, history and exam, documentation and further activities per the note      BMI  Estimated body mass index is 34.95 kg/m  as calculated from the following:    Height as of this encounter: 1.588 m (5' 2.5\").    Weight as of this encounter: 88.1 kg (194 lb 3 oz).   Weight management plan: Discussed healthy diet and exercise guidelines    Counseling  Appropriate preventive services were discussed with this patient, including applicable screening as appropriate for fall prevention, nutrition, physical activity, Tobacco-use cessation, " weight loss and cognition.  Checklist reviewing preventive services available has been given to the patient.  Reviewed patient's diet, addressing concerns and/or questions.       FUTURE APPOINTMENTS:       - Follow-up for annual visit or as needed  See Patient Instructions    Bonny Watkins is a 63 year old, presenting for the following:  Physical        5/13/2024    12:44 PM   Additional Questions   Roomed by Sidra WALSH   Accompanied by self         5/13/2024    12:44 PM   Patient Reported Additional Medications   Patient reports taking the following new medications None        Health Care Directive  Patient does not have a Health Care Directive or Living Will: Discussed advance care planning with patient; however, patient declined at this time.    Healthy Habits:     Getting at least 3 servings of Calcium per day:  Yes    Bi-annual eye exam:  NO    Dental care twice a year:  Yes    Sleep apnea or symptoms of sleep apnea:  None    Diet:  Regular (no restrictions)    Frequency of exercise:  6-7 days/week    Duration of exercise:  45-60 minutes    Taking medications regularly:  Yes    Barriers to taking medications:  None    Medication side effects:  None    Additional concerns today:  No        5/6/2024   General Health   How would you rate your overall physical health? Good   Feel stress (tense, anxious, or unable to sleep) Not at all         5/6/2024   Nutrition   Three or more servings of calcium each day? Yes   Diet: Regular (no restrictions)   How many servings of fruit and vegetables per day? (!) 2-3   How many sweetened beverages each day? 0-1         5/6/2024   Exercise   Days per week of moderate/strenous exercise 7 days   Average minutes spent exercising at this level 50 min         5/6/2024   Social Factors   Frequency of gathering with friends or relatives Three times a week   Worry food won't last until get money to buy more No   Food not last or not have enough money for food? No   Do you have  housing?  Yes   Are you worried about losing your housing? No   Lack of transportation? No   Unable to get utilities (heat,electricity)? No         5/6/2024   Fall Risk   Fallen 2 or more times in the past year? No   Trouble with walking or balance? No          5/6/2024   Dental   Dentist two times every year? Yes         5/6/2024   TB Screening   Were you born outside of the [place]? No         Today's PHQ-2 Score:       5/13/2024    12:22 PM   PHQ-2 ( 1999 [employer])   Q1: Little interest or pleasure in doing things 0   Q2: Feeling down, depressed or hopeless 0   PHQ-2 Score 0   Q1: Little interest or pleasure in doing things Not at all   Q2: Feeling down, depressed or hopeless Not at all   PHQ-2 Score 0           5/6/2024   Substance Use   Alcohol more than 3/day or more than 7/wk No   Do you use any other substances recreationally? No     Social History     Tobacco Use    Smoking status: Never    Smokeless tobacco: Never   Vaping Use    Vaping status: Never Used   Substance Use Topics    Alcohol use: Yes     Comment: occasional beer     Drug use: No           5/6/2024   LAST FHS-7 RESULTS   1st degree relative breast or ovarian cancer Yes   Any relative bilateral breast cancer No   Any male have breast cancer No   Any ONE woman have BOTH breast AND ovarian cancer No   Any woman with breast cancer before 50yrs Yes   2 or more relatives with breast AND/OR ovarian cancer Yes   2 or more relatives with breast AND/OR bowel cancer No        Mammogram Screening - Mammogram every 1-2 years updated in Health Maintenance based on mutual decision making        5/6/2024   STI Screening   New sexual partner(s) since last STI/HIV test? No     History of abnormal Pap smear: No - age 30- 64 PAP with HPV every 5 years recommended        Latest Ref Rng & Units 1/28/2020     2:15 PM 1/28/2020     2:05 PM 12/7/2016     6:57 AM   PAP / HPV   PAP (Historical)  NIL      HPV 16 DNA NEG^Negative  Negative  Negative    HPV 18 DNA NEG^Negative   "Negative  Negative    Other HR HPV NEG^Negative  Negative  Negative      ASCVD Risk   The 10-year ASCVD risk score (Violetta CONTI, et al., 2019) is: 8.2%    Values used to calculate the score:      Age: 63 years      Sex: Female      Is Non- : No      Diabetic: No      Tobacco smoker: No      Systolic Blood Pressure: 146 mmHg      Is BP treated: Yes      HDL Cholesterol: 55 mg/dL      Total Cholesterol: 215 mg/dL           Reviewed and updated as needed this visit by Provider                    Past Medical History:   Diagnosis Date    Abdominal pain, epigastric 3/29/2007    Anxiety state, unspecified     ASCUS favor benign 2011    neg HPV    Hyperlipidemia LDL goal < 130     lipitor - fatigue, niaspan - some hot flashes. simvastatin = stomach upset' And pravastatin = muscle aches.       Hypertension goal BP (blood pressure) < 140/90     lisinopril 5mg= severe vertigo; metoprolol 25mg=ringing ears/cold extremities     Increased BMI 11/1/2017    Estimated body mass index is 31.53 kg/(m^2) as calculated from the following:   Height as of 10/2/17: 5' 3\" (1.6 m).   Weight as of 10/2/17: 178 lb (80.7 kg).     Low back ache     sees Dr. Dudley Lind - Chiropractor     Medication adverse effect 3/5/2014    Other forms of migraine, with intractable migraine, so stated, without mention of status migrainosus     Symptomatic menopausal or female climacteric states     bad hot flashes     Unspecified hypothyroidism      Past Surgical History:   Procedure Laterality Date    COLONOSCOPY WITH CO2 INSUFFLATION N/A 12/21/2020    Procedure: COLONOSCOPY, WITH CO2 INSUFFLATION;  Surgeon: Ciro Graves DO;  Location:  OR     TOOTH EXTRACTION W/FORCEP      wisdom     Lab work is in process  Labs reviewed in EPIC  BP Readings from Last 3 Encounters:   05/13/24 136/82   04/25/23 125/83   07/06/22 (!) 146/86    Wt Readings from Last 3 Encounters:   05/13/24 88.1 kg (194 lb 3 oz)   04/25/23 87.9 kg (193 lb " 11.2 oz)   22 85.3 kg (188 lb)                  Patient Active Problem List   Diagnosis    Hypothyroidism, unspecified type    Simple goiter    Health Care Home    Living will, counseling/discussion    Heart murmur    Symptomatic menopausal or female climacteric states    Hyperlipidemia with target LDL less than 130    Essential hypertension with goal blood pressure less than 140/90    Elevated liver enzymes- very mild 2013    Bilateral sensorineural hearing loss - left > right    Non morbid obesity due to excess calories    ASCUS favor benign    Vestibular migraine     Past Surgical History:   Procedure Laterality Date    COLONOSCOPY WITH CO2 INSUFFLATION N/A 2020    Procedure: COLONOSCOPY, WITH CO2 INSUFFLATION;  Surgeon: Ciro Graves DO;  Location: [facility] OR    HC TOOTH EXTRACTION W/FORCEP      wisdom       Social History     Tobacco Use    Smoking status: Never    Smokeless tobacco: Never   Substance Use Topics    Alcohol use: Yes     Comment: occasional beer      Family History   Problem Relation Age of Onset    C.A.D. Father         MI at 71,  of prostate Cancer    Cancer Father         prostate cancer    Lipids Mother     Thyroid Disease Mother     Diabetes Mother         Prediabetic for years    Hypertension Mother     Hyperlipidemia Mother     Cerebrovascular Disease Mother 82    Hyperlipidemia Brother     Hypertension Brother     Breast Cancer Sister     Thyroid Disease Sister     Hyperlipidemia Sister     Breast Cancer Sister     Thyroid Disease Sister     Thyroid Disease Sister     Thyroid Disease Sister     Breast Cancer Sister     Cancer - colorectal No family hx of          Current Outpatient Medications   Medication Sig Dispense Refill    amLODIPine (NORVASC) 10 MG tablet Take 1 tablet (10 mg) by mouth daily 90 tablet 3    DULoxetine (CYMBALTA) 30 MG capsule Take 30 mg by mouth daily      levothyroxine (SYNTHROID/LEVOTHROID) 100 MCG tablet Take 1 tablet (100 mcg) by mouth daily  "90 tablet 3    rosuvastatin (CRESTOR) 10 MG tablet Take 1 tablet (10 mg) by mouth daily 90 tablet 3     Allergies   Allergen Reactions    Lisinopril Other (See Comments)     lisinopril 5mg= severe vertigo    Metoprolol      Ringing in ears & extremities feeling cold       CONSTITUTIONAL:NEGATIVE for fever, chills, change in weight  INTEGUMENTARY/SKIN: NEGATIVE for worrisome rashes, moles or lesions  EYES: NEGATIVE for vision changes or irritation  ENT: NEGATIVE for ear, mouth and throat problems  RESP:NEGATIVE for significant cough or SOB  BREAST: NEGATIVE for masses, tenderness or discharge  CV: NEGATIVE for chest pain, palpitations or peripheral edema  GI: NEGATIVE for nausea, abdominal pain, heartburn, or change in bowel habits   menopausal female: amenorrhea, no unusual urinary symptoms, and no unusual vaginal symptoms  MUSCULOSKELETAL:NEGATIVE for significant arthralgias or myalgia  NEURO: POSITIVE for diagnosed with vestibular migraine and doing well on Cymbalta  and NEGATIVE for HX seizure D/O, involuntary movements, gait disturbance, loss of consciousness, and memory problems  ENDOCRINE: NEGATIVE for temperature intolerance, skin/hair changes  HEME/ALLERGY/IMMUNE: NEGATIVE for bleeding problems  PSYCHIATRIC: NEGATIVE for changes in mood or affect     ]     Objective    Exam  BP (!) 146/89 (BP Location: Right arm, Patient Position: Sitting, Cuff Size: Adult Large)   Pulse 94   Temp 98.1  F (36.7  C) (Oral)   Resp 22   Ht 1.588 m (5' 2.5\")   Wt 88.1 kg (194 lb 3 oz)   LMP 05/15/2015   SpO2 99%   BMI 34.95 kg/m     Estimated body mass index is 34.95 kg/m  as calculated from the following:    Height as of this encounter: 1.588 m (5' 2.5\").    Weight as of this encounter: 88.1 kg (194 lb 3 oz).  Wt Readings from Last 4 Encounters:   05/13/24 88.1 kg (194 lb 3 oz)   04/25/23 87.9 kg (193 lb 11.2 oz)   04/12/22 85.3 kg (188 lb)   02/15/21 85.7 kg (189 lb)      Physical Exam  GENERAL: alert and no " distress  EYES: Eyes grossly normal to inspection and conjunctivae and sclerae normal  HENT: ear canals and TM's normal, nose and mouth without ulcers or lesions  NECK: no adenopathy, no asymmetry, masses, or scars  RESP: lungs clear to auscultation - no rales, rhonchi or wheezes  BREAST: normal without masses, tenderness or nipple discharge and no palpable axillary masses or adenopathy  CV: regular rates and rhythm, no murmur, click or rub, peripheral pulses strong, and no peripheral edema  ABDOMEN: soft, nontender, no hepatosplenomegaly, no masses and bowel sounds normal   (female): normal female external genitalia, normal urethral meatus , normal vaginal mucosa, and normal cervix, adnexae, and uterus without masses.  MS: no gross musculoskeletal defects noted, no edema  SKIN: no suspicious lesions or rashes  NEURO: Normal strength and tone, mentation intact and speech normal  PSYCH: mentation appears normal, affect normal/bright  LYMPH: no cervical, supraclavicular, axillary, or inguinal adenopathy        Signed Electronically by: MARGO Haney CNP

## 2025-05-08 ENCOUNTER — PATIENT OUTREACH (OUTPATIENT)
Dept: CARE COORDINATION | Facility: CLINIC | Age: 65
End: 2025-05-08
Payer: COMMERCIAL

## 2025-05-16 ENCOUNTER — ANCILLARY PROCEDURE (OUTPATIENT)
Dept: MAMMOGRAPHY | Facility: CLINIC | Age: 65
End: 2025-05-16
Attending: PHYSICIAN ASSISTANT
Payer: COMMERCIAL

## 2025-05-16 DIAGNOSIS — Z12.31 VISIT FOR SCREENING MAMMOGRAM: ICD-10-CM

## 2025-05-16 PROCEDURE — 77063 BREAST TOMOSYNTHESIS BI: CPT | Mod: TC | Performed by: RADIOLOGY

## 2025-05-16 PROCEDURE — 77067 SCR MAMMO BI INCL CAD: CPT | Mod: TC | Performed by: RADIOLOGY

## 2025-07-22 ENCOUNTER — TELEPHONE (OUTPATIENT)
Dept: FAMILY MEDICINE | Facility: CLINIC | Age: 65
End: 2025-07-22
Payer: COMMERCIAL

## 2025-07-22 NOTE — TELEPHONE ENCOUNTER
Patient Quality Outreach    Patient is due for the following:   Physical Annual Wellness Visit    Action(s) Taken:   Schedule a Annual Wellness Visit    Type of outreach:    Sent [employer] message.    Questions for provider review:    None         Diane L. Schoenherr, RN  Chart routed to None.

## 2025-08-20 ENCOUNTER — TELEPHONE (OUTPATIENT)
Dept: FAMILY MEDICINE | Facility: CLINIC | Age: 65
End: 2025-08-20
Payer: COMMERCIAL

## (undated) DEVICE — PREP CHLORAPREP 26ML TINTED ORANGE  260815

## (undated) DEVICE — SOL WATER IRRIG 1000ML BOTTLE 07139-09

## (undated) RX ORDER — FENTANYL CITRATE 50 UG/ML
INJECTION, SOLUTION INTRAMUSCULAR; INTRAVENOUS
Status: DISPENSED
Start: 2020-12-21